# Patient Record
Sex: FEMALE | Race: BLACK OR AFRICAN AMERICAN | Employment: OTHER | URBAN - METROPOLITAN AREA
[De-identification: names, ages, dates, MRNs, and addresses within clinical notes are randomized per-mention and may not be internally consistent; named-entity substitution may affect disease eponyms.]

---

## 2017-01-03 ENCOUNTER — HOSPITAL ENCOUNTER (OUTPATIENT)
Dept: INFUSION THERAPY | Age: 62
Discharge: HOME | End: 2017-01-03
Payer: OTHER GOVERNMENT

## 2017-01-03 VITALS
TEMPERATURE: 98.6 F | OXYGEN SATURATION: 95 % | WEIGHT: 186.6 LBS | BODY MASS INDEX: 32.03 KG/M2 | DIASTOLIC BLOOD PRESSURE: 86 MMHG | SYSTOLIC BLOOD PRESSURE: 135 MMHG | HEART RATE: 84 BPM | RESPIRATION RATE: 16 BRPM

## 2017-01-03 DIAGNOSIS — C50.911 INFILTRATING DUCTAL CARCINOMA OF RIGHT BREAST (HCC): ICD-10-CM

## 2017-01-03 LAB
ALBUMIN SERPL BCP-MCNC: 3.7 G/DL (ref 3.2–4.6)
ALBUMIN/GLOB SERPL: 1 {RATIO} (ref 1.2–3.5)
ALP SERPL-CCNC: 68 U/L (ref 50–136)
ALT SERPL-CCNC: 18 U/L (ref 12–65)
ANION GAP BLD CALC-SCNC: 5 MMOL/L (ref 7–16)
AST SERPL W P-5'-P-CCNC: 9 U/L (ref 15–37)
BASOPHILS # BLD AUTO: 0 K/UL (ref 0–0.2)
BASOPHILS # BLD: 1 % (ref 0–2)
BILIRUB SERPL-MCNC: 0.3 MG/DL (ref 0.2–1.1)
BUN SERPL-MCNC: 12 MG/DL (ref 8–23)
CALCIUM SERPL-MCNC: 9.3 MG/DL (ref 8.3–10.4)
CHLORIDE SERPL-SCNC: 107 MMOL/L (ref 98–107)
CO2 SERPL-SCNC: 29 MMOL/L (ref 23–32)
CREAT SERPL-MCNC: 0.81 MG/DL (ref 0.6–1)
DIFFERENTIAL METHOD BLD: ABNORMAL
EOSINOPHIL # BLD: 0.1 K/UL (ref 0–0.8)
EOSINOPHIL NFR BLD: 1 % (ref 0.5–7.8)
ERYTHROCYTE [DISTWIDTH] IN BLOOD BY AUTOMATED COUNT: 14.4 % (ref 11.9–14.6)
GLOBULIN SER CALC-MCNC: 3.6 G/DL (ref 2.3–3.5)
GLUCOSE SERPL-MCNC: 98 MG/DL (ref 65–100)
HCT VFR BLD AUTO: 28.1 % (ref 35.8–46.3)
HGB BLD-MCNC: 9.1 G/DL (ref 11.7–15.4)
LYMPHOCYTES # BLD AUTO: 35 % (ref 13–44)
LYMPHOCYTES # BLD: 1.9 K/UL (ref 0.5–4.6)
MCH RBC QN AUTO: 30.7 PG (ref 26.1–32.9)
MCHC RBC AUTO-ENTMCNC: 32.4 G/DL (ref 31.4–35)
MCV RBC AUTO: 94.9 FL (ref 79.6–97.8)
MONOCYTES # BLD: 0.4 K/UL (ref 0.1–1.3)
MONOCYTES NFR BLD AUTO: 7 % (ref 4–12)
NEUTS SEG # BLD: 3 K/UL (ref 1.7–8.2)
NEUTS SEG NFR BLD AUTO: 57 % (ref 43–78)
NRBC # BLD: 0 K/UL (ref 0–0.2)
PLATELET # BLD AUTO: 339 K/UL (ref 150–450)
PMV BLD AUTO: 9.1 FL (ref 10.8–14.1)
POTASSIUM SERPL-SCNC: 3.7 MMOL/L (ref 3.5–5.1)
PROT SERPL-MCNC: 7.3 G/DL (ref 6.3–8.2)
RBC # BLD AUTO: 2.96 M/UL (ref 4.05–5.25)
SODIUM SERPL-SCNC: 141 MMOL/L (ref 136–145)
WBC # BLD AUTO: 5.4 K/UL (ref 4.3–11.1)

## 2017-01-03 PROCEDURE — 96375 TX/PRO/DX INJ NEW DRUG ADDON: CPT

## 2017-01-03 PROCEDURE — 96413 CHEMO IV INFUSION 1 HR: CPT

## 2017-01-03 PROCEDURE — 80053 COMPREHEN METABOLIC PANEL: CPT | Performed by: INTERNAL MEDICINE

## 2017-01-03 PROCEDURE — 77030003560 HC NDL HUBR BARD -A

## 2017-01-03 PROCEDURE — 74011250636 HC RX REV CODE- 250/636: Performed by: INTERNAL MEDICINE

## 2017-01-03 PROCEDURE — 96417 CHEMO IV INFUS EACH ADDL SEQ: CPT

## 2017-01-03 PROCEDURE — 85025 COMPLETE CBC W/AUTO DIFF WBC: CPT | Performed by: INTERNAL MEDICINE

## 2017-01-03 PROCEDURE — 74011000250 HC RX REV CODE- 250: Performed by: INTERNAL MEDICINE

## 2017-01-03 RX ORDER — DIPHENHYDRAMINE HYDROCHLORIDE 50 MG/ML
25 INJECTION, SOLUTION INTRAMUSCULAR; INTRAVENOUS ONCE
Status: COMPLETED | OUTPATIENT
Start: 2017-01-03 | End: 2017-01-03

## 2017-01-03 RX ORDER — ONDANSETRON 2 MG/ML
8 INJECTION INTRAMUSCULAR; INTRAVENOUS ONCE
Status: COMPLETED | OUTPATIENT
Start: 2017-01-03 | End: 2017-01-03

## 2017-01-03 RX ORDER — DEXAMETHASONE SODIUM PHOSPHATE 100 MG/10ML
8 INJECTION INTRAMUSCULAR; INTRAVENOUS ONCE
Status: COMPLETED | OUTPATIENT
Start: 2017-01-03 | End: 2017-01-03

## 2017-01-03 RX ORDER — FAMOTIDINE 10 MG/ML
20 INJECTION INTRAVENOUS ONCE
Status: COMPLETED | OUTPATIENT
Start: 2017-01-03 | End: 2017-01-03

## 2017-01-03 RX ORDER — SODIUM CHLORIDE 0.9 % (FLUSH) 0.9 %
10-30 SYRINGE (ML) INJECTION AS NEEDED
Status: DISCONTINUED | OUTPATIENT
Start: 2017-01-03 | End: 2017-01-07 | Stop reason: HOSPADM

## 2017-01-03 RX ORDER — HEPARIN 100 UNIT/ML
300 SYRINGE INTRAVENOUS AS NEEDED
Status: DISPENSED | OUTPATIENT
Start: 2017-01-03 | End: 2017-01-03

## 2017-01-03 RX ADMIN — DEXAMETHASONE SODIUM PHOSPHATE 8 MG: 10 INJECTION INTRAMUSCULAR; INTRAVENOUS at 12:48

## 2017-01-03 RX ADMIN — ONDANSETRON 8 MG: 2 INJECTION INTRAMUSCULAR; INTRAVENOUS at 12:44

## 2017-01-03 RX ADMIN — SODIUM CHLORIDE, PRESERVATIVE FREE 300 UNITS: 5 INJECTION INTRAVENOUS at 15:10

## 2017-01-03 RX ADMIN — Medication 166 MG: at 13:25

## 2017-01-03 RX ADMIN — DIPHENHYDRAMINE HYDROCHLORIDE 25 MG: 50 INJECTION, SOLUTION INTRAMUSCULAR; INTRAVENOUS at 12:52

## 2017-01-03 RX ADMIN — Medication 10 ML: at 15:10

## 2017-01-03 RX ADMIN — FAMOTIDINE 20 MG: 10 INJECTION, SOLUTION INTRAVENOUS at 12:42

## 2017-01-03 RX ADMIN — SODIUM CHLORIDE 500 ML: 900 INJECTION, SOLUTION INTRAVENOUS at 12:55

## 2017-01-03 RX ADMIN — Medication 10 ML: at 12:00

## 2017-01-03 RX ADMIN — PACLITAXEL 154 MG: 6 INJECTION, SOLUTION, CONCENTRATE INTRAVENOUS at 14:00

## 2017-01-03 NOTE — PROGRESS NOTES
Arrived to the [facility]. Herceptin and Taxol completed  Any issues or concerns during appointment:No  Patient aware of next infusion appointment on Tuesday,January 10th @ 1200  Discharged home accompanied by

## 2017-01-10 ENCOUNTER — HOSPITAL ENCOUNTER (OUTPATIENT)
Dept: LAB | Age: 62
Discharge: HOME | End: 2017-01-10
Payer: OTHER GOVERNMENT

## 2017-01-10 ENCOUNTER — HOSPITAL ENCOUNTER (OUTPATIENT)
Dept: INFUSION THERAPY | Age: 62
Discharge: HOME | End: 2017-01-10
Payer: OTHER GOVERNMENT

## 2017-01-10 DIAGNOSIS — C50.911 INFILTRATING DUCTAL CARCINOMA OF RIGHT BREAST (HCC): ICD-10-CM

## 2017-01-10 LAB
ALBUMIN SERPL BCP-MCNC: 3.5 G/DL (ref 3.2–4.6)
ALBUMIN/GLOB SERPL: 1 {RATIO} (ref 1.2–3.5)
ALP SERPL-CCNC: 67 U/L (ref 50–136)
ALT SERPL-CCNC: 18 U/L (ref 12–65)
ANION GAP BLD CALC-SCNC: 7 MMOL/L (ref 7–16)
AST SERPL W P-5'-P-CCNC: 10 U/L (ref 15–37)
BASOPHILS # BLD AUTO: 0 K/UL (ref 0–0.2)
BASOPHILS # BLD: 1 % (ref 0–2)
BILIRUB SERPL-MCNC: 0.3 MG/DL (ref 0.2–1.1)
BUN SERPL-MCNC: 12 MG/DL (ref 8–23)
CALCIUM SERPL-MCNC: 9.2 MG/DL (ref 8.3–10.4)
CHLORIDE SERPL-SCNC: 107 MMOL/L (ref 98–107)
CO2 SERPL-SCNC: 29 MMOL/L (ref 23–32)
CREAT SERPL-MCNC: 0.72 MG/DL (ref 0.6–1)
DIFFERENTIAL METHOD BLD: ABNORMAL
EOSINOPHIL # BLD: 0.1 K/UL (ref 0–0.8)
EOSINOPHIL NFR BLD: 1 % (ref 0.5–7.8)
ERYTHROCYTE [DISTWIDTH] IN BLOOD BY AUTOMATED COUNT: 14.5 % (ref 11.9–14.6)
GLOBULIN SER CALC-MCNC: 3.6 G/DL (ref 2.3–3.5)
GLUCOSE SERPL-MCNC: 101 MG/DL (ref 65–100)
HCT VFR BLD AUTO: 27.8 % (ref 35.8–46.3)
HGB BLD-MCNC: 9 G/DL (ref 11.7–15.4)
LYMPHOCYTES # BLD AUTO: 39 % (ref 13–44)
LYMPHOCYTES # BLD: 2 K/UL (ref 0.5–4.6)
MCH RBC QN AUTO: 30.8 PG (ref 26.1–32.9)
MCHC RBC AUTO-ENTMCNC: 32.4 G/DL (ref 31.4–35)
MCV RBC AUTO: 95.2 FL (ref 79.6–97.8)
MONOCYTES # BLD: 0.3 K/UL (ref 0.1–1.3)
MONOCYTES NFR BLD AUTO: 7 % (ref 4–12)
NEUTS SEG # BLD: 2.7 K/UL (ref 1.7–8.2)
NEUTS SEG NFR BLD AUTO: 53 % (ref 43–78)
NRBC # BLD: 0 K/UL (ref 0–0.2)
PLATELET # BLD AUTO: 288 K/UL (ref 150–450)
PMV BLD AUTO: 8.7 FL (ref 10.8–14.1)
POTASSIUM SERPL-SCNC: 3.3 MMOL/L (ref 3.5–5.1)
PROT SERPL-MCNC: 7.1 G/DL (ref 6.3–8.2)
RBC # BLD AUTO: 2.92 M/UL (ref 4.05–5.25)
SODIUM SERPL-SCNC: 143 MMOL/L (ref 136–145)
WBC # BLD AUTO: 5.1 K/UL (ref 4.3–11.1)

## 2017-01-10 PROCEDURE — 96417 CHEMO IV INFUS EACH ADDL SEQ: CPT

## 2017-01-10 PROCEDURE — 80053 COMPREHEN METABOLIC PANEL: CPT | Performed by: INTERNAL MEDICINE

## 2017-01-10 PROCEDURE — 96375 TX/PRO/DX INJ NEW DRUG ADDON: CPT

## 2017-01-10 PROCEDURE — 74011000250 HC RX REV CODE- 250: Performed by: INTERNAL MEDICINE

## 2017-01-10 PROCEDURE — 74011250636 HC RX REV CODE- 250/636: Performed by: INTERNAL MEDICINE

## 2017-01-10 PROCEDURE — 96361 HYDRATE IV INFUSION ADD-ON: CPT

## 2017-01-10 PROCEDURE — 85025 COMPLETE CBC W/AUTO DIFF WBC: CPT | Performed by: INTERNAL MEDICINE

## 2017-01-10 PROCEDURE — 96413 CHEMO IV INFUSION 1 HR: CPT

## 2017-01-10 RX ORDER — SODIUM CHLORIDE 0.9 % (FLUSH) 0.9 %
10 SYRINGE (ML) INJECTION AS NEEDED
Status: ACTIVE | OUTPATIENT
Start: 2017-01-10 | End: 2017-01-11

## 2017-01-10 RX ORDER — DIPHENHYDRAMINE HYDROCHLORIDE 50 MG/ML
25 INJECTION, SOLUTION INTRAMUSCULAR; INTRAVENOUS ONCE
Status: COMPLETED | OUTPATIENT
Start: 2017-01-10 | End: 2017-01-10

## 2017-01-10 RX ORDER — ONDANSETRON 2 MG/ML
8 INJECTION INTRAMUSCULAR; INTRAVENOUS ONCE
Status: COMPLETED | OUTPATIENT
Start: 2017-01-10 | End: 2017-01-10

## 2017-01-10 RX ORDER — FAMOTIDINE 10 MG/ML
20 INJECTION INTRAVENOUS ONCE
Status: COMPLETED | OUTPATIENT
Start: 2017-01-10 | End: 2017-01-10

## 2017-01-10 RX ORDER — HEPARIN 100 UNIT/ML
300-500 SYRINGE INTRAVENOUS AS NEEDED
Status: DISPENSED | OUTPATIENT
Start: 2017-01-10 | End: 2017-01-11

## 2017-01-10 RX ORDER — DEXAMETHASONE SODIUM PHOSPHATE 100 MG/10ML
8 INJECTION INTRAMUSCULAR; INTRAVENOUS ONCE
Status: COMPLETED | OUTPATIENT
Start: 2017-01-10 | End: 2017-01-10

## 2017-01-10 RX ADMIN — Medication 166 MG: at 13:05

## 2017-01-10 RX ADMIN — DEXAMETHASONE SODIUM PHOSPHATE 8 MG: 10 INJECTION INTRAMUSCULAR; INTRAVENOUS at 12:22

## 2017-01-10 RX ADMIN — SODIUM CHLORIDE, PRESERVATIVE FREE 300 UNITS: 5 INJECTION INTRAVENOUS at 14:47

## 2017-01-10 RX ADMIN — Medication 10 ML: at 14:47

## 2017-01-10 RX ADMIN — FAMOTIDINE 20 MG: 10 INJECTION, SOLUTION INTRAVENOUS at 12:28

## 2017-01-10 RX ADMIN — DIPHENHYDRAMINE HYDROCHLORIDE 25 MG: 50 INJECTION, SOLUTION INTRAMUSCULAR; INTRAVENOUS at 12:30

## 2017-01-10 RX ADMIN — PACLITAXEL 154 MG: 6 INJECTION, SOLUTION, CONCENTRATE INTRAVENOUS at 13:43

## 2017-01-10 RX ADMIN — ONDANSETRON 8 MG: 2 INJECTION INTRAMUSCULAR; INTRAVENOUS at 12:20

## 2017-01-10 RX ADMIN — SODIUM CHLORIDE 500 ML: 900 INJECTION, SOLUTION INTRAVENOUS at 12:32

## 2017-01-10 NOTE — PROGRESS NOTES
Pt arrived ambulatory for C3 D22 Taxol, herceptin, she denies new complaints. Pt tolerated infusion without incident. Pt dc'd stable, to return to [facility] on 1/17 at 1115.

## 2017-01-12 ENCOUNTER — HOSPITAL ENCOUNTER (OUTPATIENT)
Dept: RADIATION ONCOLOGY | Age: 62
Discharge: HOME | End: 2017-01-12
Payer: OTHER GOVERNMENT

## 2017-01-12 VITALS
BODY MASS INDEX: 32.08 KG/M2 | HEART RATE: 83 BPM | TEMPERATURE: 98.6 F | WEIGHT: 186.9 LBS | DIASTOLIC BLOOD PRESSURE: 100 MMHG | OXYGEN SATURATION: 96 % | SYSTOLIC BLOOD PRESSURE: 165 MMHG

## 2017-01-12 PROCEDURE — 99211 OFF/OP EST MAY X REQ PHY/QHP: CPT

## 2017-01-12 NOTE — CONSULTS
Patient: Meagan Quiñonez MRN: [id number]  SSN: [id number]    YOB: 1955  Age: 62 y.o. Sex: female      Other Providers:  Richard Escalante MD    CHIEF COMPLAINT: Breast cancer    DIAGNOSIS: Right sided [id number] Stage IA invasive ductal carcinoma, two foci of disease, largest 0.7 cm.  92% ER, 7% MS, HER2 3+ positive    PREVIOUS TREATMENT:  1) 8/26/16:  Right breast lumpectomy   2) 9/23/16: Re-resection and SLN biopsy   3) Weekly paclitaxel + trastuzumab for 12 weeks, followed by Herceptin monotherapy to complete a year    HISTORY OF PRESENT ILLNESS:  Meagan Quiñonez is a 62 y.o. female who I am seeing at the request of Richard Escalante MD.  She had a screening mammogram in June 2016 that showed a developing group of calcifications in the upper outer right breast. She completed biopsy which showed DCIS. MRI was also completed showing a 9 mm enhancing mass. She was referred for surgical management and underwent lumpectomy on 8/26/16. Pathology review surprisingly showed two foci of invasive ductal carcinoma in a background of DCIS. The larger of the two foci was 0.7 cm, and receptors showed 92% ER and 7% MS, but also showed HER2 3+ positive. She went back for sentinel node biopsy and reexcision, both of which were negative for residual carcinoma. She was referred to medical oncology for adjuvant recommendations and saw Dr. Khushi Newton who felt she would benefit from APT regimen with weekly paclitaxel + trastuzumab for 12 weeks, followed by Herceptin monotherapy to complete a year. She will also be receiving endocrine therapy with AI.      She was seen by Dr. Khushi Newton prior to her final chemotherapy and noted nausea and fatigue from chemo but was manageable. No fevers, chills or other infectious symptoms. Neuropathy grade 1, stable and not interfering with ADLs. Pain in the tips of her nails, but not interfering with ADLs.  With the completion of chemo, she was referred for adjuvant radiation recommendations which is the rationale for my visit with her today. OBSTETRIC HISTORY:    Menarche at the age of 14.    G3,P1. Oral Contraceptive Pills:  Around 15 years  Hormone Replacement Therapy:  Premarin in June and took only briefly. PAST MEDICAL HISTORY:    Past Medical History   Diagnosis Date    Anemia >20yrs ago    Cancer [facility]) Dx 10/2016     right breast cancer    Ductal carcinoma in situ (DCIS) of right breast 7/2016    Enlarged uterus 5/31/2016    Hypertension      managed with medication    Murmur, functional      diagnosed as teenager    Obesity 5/31/2016    Paroxysmal atrial fibrillation ([facility] [address] 09/11/2001    Psychiatric disorder      anxiety    Sleep apnea 09/11/2013     can not tolerate c pap    Vaginal atrophy 5/31/2016       The patient denies history of collagen vascular diseases, pacemaker insertion, prior radiation or prior chemotherapy. PAST SURGICAL HISTORY:   Past Surgical History   Procedure Laterality Date    Hx tubal ligation      Hx orthopaedic       left wrist ORIF    Hx colonoscopy  2007     diverticulosis    Lynnette biopsy breast stereotactic Right 7.19.16    Hx breast lumpectomy Right 8/26/2016     RIGHT BREAST LUMPECTOMY performed by Shane Zamora MD     Hx breast biopsy Right 8/26/2016     RIGHT BREAST NEEDLE LOCALIZED BIOPSY performed by Shane Zamora MD     Hx breast lumpectomy Right 9/23/2016     RIGHT BREAST LUMPECTOMY/PARTIAL REVISION  performed by Shane Zamora    Hx vascular access         MEDICATIONS:     Current Outpatient Prescriptions:     LOPERAMIDE HCL (IMODIUM PO), Take  by mouth as needed. , Disp: , Rfl:     DIPHENHYDRAMINE HCL (BENADRYL PO), Take  by mouth as needed. , Disp: , Rfl:     lidocaine-prilocaine (EMLA) topical cream, Apply  to affected area as needed for Pain. Apply to port site 45-60 minutes prior to lab appt or infusion. , Disp: 30 g, Rfl: 1    ondansetron hcl (ZOFRAN, AS HYDROCHLORIDE,) 8 mg tablet, Take 1 Tab by mouth every eight (8) hours as needed for Nausea., Disp: 90 Tab, Rfl: 2    LORazepam (ATIVAN) 0.5 mg tablet, Take 1 Tab by mouth daily as needed for Anxiety. (Patient taking differently: Take 0.5 mg by mouth as needed for Anxiety.), Disp: 30 Tab, Rfl: 1    hydroCHLOROthiazide (HYDRODIURIL) 25 mg tablet, Take 1 Tab by mouth daily. , Disp: 90 Tab, Rfl: 1    lisinopril (PRINIVIL, ZESTRIL) 40 mg tablet, Take 1 Tab by mouth daily. , Disp: 90 Tab, Rfl: 1    atorvastatin (LIPITOR) 40 mg tablet, Take 1 Tab by mouth nightly., Disp: 90 Tab, Rfl: 1    amLODIPine (NORVASC) 10 mg tablet, Take 1 Tab by mouth daily. , Disp: 90 Tab, Rfl: 1    sertraline (ZOLOFT) 100 mg tablet, Take 1 Tab by mouth daily. , Disp: 30 Tab, Rfl: 0    aspirin (JESSICA CHEWABLE ASPIRIN) 81 mg chewable tablet, Take 81 mg by mouth daily. , Disp: , Rfl:     ALLERGIES:   Allergies   Allergen Reactions    Pcn [Penicillins] Hives       SOCIAL HISTORY:   Social History     Social History    Marital status:      Spouse name: N/A    Number of children: N/A    Years of education: N/A     Occupational History    Not on file. Social History Main Topics    Smoking status: Never Smoker    Smokeless tobacco: Never Used    Alcohol use No    Drug use: No    Sexual activity: Not on file     Other Topics Concern    Not on file     Social History Narrative    Lives at home with , Diane Schwartz, and her mother. Homemaker. FAMILY HISTORY:   Family History   Problem Relation Age of Onset    Diabetes Mother    [address] [facility] Alzheimer Mother     No Known Problems Father        REVIEW OF SYSTEMS: Please see the completed review of systems sheet in the chart that I have reviewed today.       PHYSICAL EXAMINATION:   ECOG Performance status 0  VITAL SIGNS:   Visit Vitals    BP (!) 165/100 (BP 1 Location: Left arm, BP Patient Position: Sitting)    Pulse 83    Temp 98.6 °F (37 °C) (Oral)    Wt 84.8 kg (186 lb 14.4 oz)    SpO2 96%    BMI 32.08 kg/m2        GENERAL: The patient is well-developed, ambulatory, alert and in no acute distress. HEENT: Head is normocephalic, atraumatic. Pupils are equal, round and reactive to light and accommodation. Extraocular movement intact. Hearing is intact bilaterally to finger rub. Oral cavity reveals no lesions. Mucous membranes are moist. NECK: Neck is supple with no masses. CARDIOVASCULAR: Heart is regular rate and rhythm. There are no murmurs rubs or gallups. Radial pulses are 2+ RESPIRATORY: Lungs are clear to auscultation and percussion. There is normal respiratory effort. GASTROINTESTINAL: The abdomen is soft, non-tender, nondistended with no hepatospelnomagaly. Digital rectal examination: deferred LYMPHATIC: There is no cervical, supraclavicular or axillary lymphadenopathy bilaterally. MUSCULOSKELETAL: Extremities reveal no cyanosis, clubbing or edema.  is 5+/5. BREASTS: Examination of the unaffected breast reveals no dominant nodules or masses. The lumpectomy cavity appears well healed with good aesthetics and symmetry. Well healed surgical incision. NEURO:  Cranial nerves II-XII grossly intact. Muscular strength and sensation are intact throughout all four extremities. PATHOLOGY:    8/26/16:     DIAGNOSIS   RIGHT BREAST LOCALIZED WIDE EXCISION: TWO FOCI OF INFILTRATING DUCT CARCINOMA, LOW GRADE (WELL DIFFERENTIATED) IN A BACKGROUND OF MULTIFOCAL DUCTAL CARCINOMA IN-SITU, HIGH GRADE (CRIBRIFORM AND SOLID TYPE). ONE FOCUS OF INFILTRATING CARCINOMA IS APPROXIMATELY 0.7 X 0.4 CM ON SLIDE AND IS APPROXIMATELY 0.5 CM FROM MARKED ANTERIOR MARGIN. OTHER MARGINS ARE GREATER THAN 1 CM FROM THIS INFILTRATING TUMOR. SECOND FOCUS OF INFILTRATING CARCINOMA IS APPROXIMATELY 0.1 X 0.1 CM AND IS APPROXIMATELY 0.2 CM FROM MARKED POSTERIOR MARGIN. DUCTAL CARCINOMA IN-SITU IS PRESENT LESS THAN 0.1 CM FROM MARKED ANTERIOR MARGIN AND APPROXIMATELY 0.1 CM FROM MARKED POSTERIOR AND MEDIAL MARGINS.  OTHER MARGINS ARE GREATER THAN 1 CM FROM TUMOR (SEE [id number]). Interpretation   Clarient Diagnostic Result:   TEST NAME: RESULTS: INTERNAL CONTROLS:   ESTROGEN RECEPTOR: Positive (92%) Present and ER positive   PROGESTERONE RECEPTOR: Positive (7%) Present and PA positive   HER-2/JAGDISH: Positive (3+) Percent of cells with 3+ stain: 38%      ANATOMIC SITE: Right breast (presumably 10 o'clock position based on prior core biopsy). PROCEDURE: Needle localized wide excision. HISTOLOGIC TYPE: Infiltrating duct carcinoma. SIZE: Two foci, one approximately 0.7 x 0.4 cm and an apparent separate focus approximately 0.1 x 0.1 cm. UNIFOCAL OR MULTIFOCAL: Apparently multifocal.   PRESENCE OF SKIN, NIPPLE OR SKELETAL MUSCLE INVOLVEMENT: Not identified. ZARA MODIFICATION OF BLOOM-KAPADIA GRADE:   ARCHITECTURAL SCORE: 2/3. NUCLEAR SCORE: 2/3. MITOTIC SCORE: 1/3. TOTAL SCORE: 5/9 = Low Grade for both tumors. IN-SITU COMPONENT: Multifocal ductal carcinoma in-situ is noted, high grade (cribriform and solid type). LYMPHOVASCULAR INVASION: Not identified. ARE MICROCALCIFICATIONS IDENTIFIED: Within ductal carcinoma in-situ. TUMOR DISTANCE TO CLOSEST MARGIN: Ductal carcinoma in-situ is present less than 0.1 cm from marked anterior margin and approximately 0.1 cm from marked medial margin. Small focus of infiltrating carcinoma is present approximately 0.2 cm from marked deep margin. ANTERIOR (SUPERFICIAL): Ductal carcinoma in-situ is present less than 0.1 cm from marked anterior margin and larger focus of infiltrating carcinoma is present approximately 0.5 cm from marked anterior margin. POSTERIOR (DEEP): Focus of ductal carcinoma in-situ is present approximately 0.1 cm from marked posterior margin and small focus of infiltrating carcinoma is present approximately 0.2 cm from marked posterior margin. MEDIAL: Focus of ductal carcinoma in-situ is present approximately 0.1 cm from marked medial margin. LATERAL: Greater than 1 cm.    INFERIOR: Greater than 1 cm.   SUPERIOR: Greater than 1 cm. LYMPH NODE STATUS: Does not apply. TOTAL NUMBER OF LYMPH NODES CONTAINING CARCINOMA: Does not apply. TOTAL NUMBER OF LYMPH NODES EXAMINED: Does not apply. METASTASIS: Does not apply. SIZE OF LARGEST POSITIVE NODE: Does not apply. EXTRA-CAPSULAR EXTENSION OF TUMOR: Does not apply. OTHER FINDINGS: Changes consistent with prior biopsy site. TNM CLASSIFICATION: pT1 pNX.     9/23/16:   DIAGNOSIS   A: POST LUMPECTOMY MARGIN RIGHT BREAST:   BENIGN BREAST TISSUE WITH FIBROCYSTIC MASTOPATHY AND FAT NECROSIS. NO RESIDUAL CARCINOMA IS IDENTIFIED. B: SENTINEL NODE RIGHT BREAST:   ONE LYMPH NODE NEGATIVE FOR METASTATIC     LABORATORY:   Lab Results   Component Value Date/Time    Sodium 143 01/10/2017 10:56 AM    Potassium 3.3 01/10/2017 10:56 AM    Chloride 107 01/10/2017 10:56 AM    CO2 29 01/10/2017 10:56 AM    Anion gap 7 01/10/2017 10:56 AM    Glucose 101 01/10/2017 10:56 AM    BUN 12 01/10/2017 10:56 AM    Creatinine 0.72 01/10/2017 10:56 AM    GFR est AA >60 01/10/2017 10:56 AM    GFR est non-AA >60 01/10/2017 10:56 AM    Calcium 9.2 01/10/2017 10:56 AM    Magnesium 2.1 11/01/2016 10:40 AM    Albumin 3.5 01/10/2017 10:56 AM    Protein, total 7.1 01/10/2017 10:56 AM    Globulin 3.6 01/10/2017 10:56 AM    A-G Ratio 1.0 01/10/2017 10:56 AM    AST 10 01/10/2017 10:56 AM    ALT 18 01/10/2017 10:56 AM     Lab Results   Component Value Date/Time    WBC 5.1 01/10/2017 10:56 AM    HGB 9.0 01/10/2017 10:56 AM    HCT 27.8 01/10/2017 10:56 AM    PLATELET 759 01/46/8926 10:56 AM       RADIOLOGY:    I have personally reviewed the imaging and agree with the reports below.    6/24/16:STUDY: Bilateral digital screening mammogram      INDICATION: Screening     COMPARISON: 12/21/2012     FINDINGS: Bilateral digital mammography was performed, and is interpreted in  conjunction with a computer assisted detection (CAD) system.      The breast parenchymal density is normal. There is developing calcification in  the upper outer right breast. No significant masses are identified. There is  no skin thickening or nipple retraction. There is no architectural distortion.     IMPRESSION: Developing calcification in the upper outer right breast.   Magnification views recommended.     BI-RADS Assessment Category 0: Incomplete: Needs additional imaging evaluation.     We will attempt to contact the patient and arrange this additional imaging.     BD2    MRI BILATERAL BREASTS WITHOUT AND WITH CONTRAST, 7/29/2016.      CLINICAL HISTORY: 60-year-old with new diagnosis of right DCIS. Patient without  breast complaints.     Technique: Multiplanar multisequence imaging of the breast were performed prior  to and following the uneventful intravenous administration of 17 mL of  Magnevist. Postcontrast imaging was performed using a dynamic technique. Images  were reviewed using the [employer].      Sequences obtained: Sagittal T2, axial STIR, axial T1, and axial fat-saturated  T1-weighted images prior to and following administration of contrast.      Comparison studies: Bilateral mammogram 6/24/2016.      FINDINGS:   A skin marker has been placed at the 10:00 position of the left breast located  10 cm from the nipple marking the patient's biopsy site. The breasts are  composed of heterogeneous fibroglandular elements. No enlarged axillary lymph  nodes are seen. No enlarged internal mammary lymph nodes are seen. Evaluation of  the lungs is limited by MRI imaging. However, no obvious pulmonary masses are  seen. No significant pleural effusions are seen. The liver is obscured by  cardiac motion artifact and only partially visualized. A T2 hyperintense lesion  is suggested in the superior right lobe on axial STIR image 9 which likely  represents a small cyst.       Following the administration of intravenous contrast, normal enhancement is seen  of the heart and vascular structures of the breasts. Moderate background  physiologic enhancement of the breasts is seen. An enhancing biopsy tract is  seen extending from the lateral towards the central breast. The biopsy cavity is  felt to be best appreciated on axial postcontrast subtraction image 261, and  sagittal postcontrast reconstructed image 47. Just anterior and inferior to the  biopsy cavity, there is a 6 mm x 8 mm x 9 mm area of masslike enhancement best  appreciated on axial postcontrast subtraction image 259, and sagittal  reconstructed postcontrast image 48 which may represent a small area of invasive  disease. This is felt to correspond to an adjacent nodular density as seen on  the prior diagnostic mammogram. Additional nodular enhancement is seen extending  anteriorly to the biopsy cavity by approximately 3.2 cm best appreciated on  sagittal reconstructed postcontrast image 46 for which additional noninvasive  disease cannot be excluded. These do appear to correspond to an additional faint  calcifications anterior to the biopsied cluster seen on the prior diagnostic  mammogram. Stereotactic biopsy of the additional calcifications can be  considered if this would change clinical management. No evidence of skin  thickening, or nipple retraction is seen.      IMPRESSION:  1. Post biopsy changes in the outer right breast. A small 9 mm enhancing mass,  and additional nonmasslike enhancement are seen anterior to the biopsy cavity as  described above suggesting potential multifocal disease. No evidence for  multicentric disease, or metastatic disease is otherwise seen.     BI-RADS Assessment Category 6: Known Biopsy Proven Malignancy    IMPRESSION:  Anika Tidwell is a 62 y.o. female with Stage IA breast cancer. The natural history of breast cancer was reviewed with the patient. Prognostic features including stage, performance status and presence or absence of lymph node involvement were reviewed with the patient.    Various treatment options including lumpectomy alone, breast conservation therapy, and mastectomy were compared and contrasted with regard to outcome and quality of life. We discussed the data in support of breast conservation therapy, specifically NSABP B-06, which compared mastectomy to lumpectomy plus or minus radiation. This showed no difference in overall survival but improved local regional control with adjuvant radiation. This has become the standard for patient's wishing to conserve the breast.  Subsequent trials have evaluated the role of boost following an initial course and again showed improved control. We therefore typically recommend 50 gray to the breast followed by a 10 Gray boost to the lumpectomy cavity. There is now also long-term data in support of hypofractionation which has come out of [address] [place] [address] where 3-4 weeks of radiation was compared to the conventional treatment and to date has shown equivalent tumor control and cosmetic outcomes. This is an option for women who were not excluded from the studies or found on subgroup analysis to suffer worse outcomes: women with Grade III tumors, generally women receiving chemotherapy, with large breast size such that significant heterogeneity can not be avoided, and lymph node node positive disease. JUAN JOSE consensus guidelines now support these conclusions. With her having received chemotherapy, she would not be a candidate for hypofractionation. The practicalities, indications, benefits, side-effects and complications of radiation therapy were discussed. Skin changes fatigue, and potential for long-term complications including radiation pneumonitis, and rib fractures were among the complications highlighted. I have recommended a course of radiation therapy to the breast as a standard portion of breast conservation therapy.   The patient and her  asked numerous questions, which were answered to their satisfaction and written informed consent for radiation therapy was obtained. Plan:  1. Genetic testing-not indicated  2. Smoking cessation-not indicated  3. Patient will be simulated, with radiotherapy to begin shortly thereafter. A dose of 50 Gy to the whole breast followed by a 10 Gy in 5 fraction boost to the tumor bed will be prescribed.      Chaparrita Navarrete MD   January 12, 2017

## 2017-01-12 NOTE — NURSE NAVIGATOR
Pt here for consult for right breast cancer. S/p lumpectomy x 2 and chemo. On Herceptin.  4/ Para 3. Started menses at 14 yoa. Postmenopausal.  Hx contraceptive use. Started premarin in . Took  Briefly. CONSENTS SIGNED FOR RT TO THE RIGHT BREAST.   CT [facility] SCHEDULED FOR 17 AT 10 AM.  APT GIVEN TO PT.    Catie Aden RN

## 2017-01-17 ENCOUNTER — HOSPITAL ENCOUNTER (OUTPATIENT)
Dept: INFUSION THERAPY | Age: 62
Discharge: HOME | End: 2017-01-17
Payer: OTHER GOVERNMENT

## 2017-01-17 ENCOUNTER — HOSPITAL ENCOUNTER (OUTPATIENT)
Dept: RADIATION ONCOLOGY | Age: 62
Discharge: HOME | End: 2017-01-17
Payer: OTHER GOVERNMENT

## 2017-01-17 VITALS
RESPIRATION RATE: 18 BRPM | DIASTOLIC BLOOD PRESSURE: 92 MMHG | HEART RATE: 83 BPM | SYSTOLIC BLOOD PRESSURE: 163 MMHG | OXYGEN SATURATION: 97 % | TEMPERATURE: 98.2 F | WEIGHT: 186.29 LBS | BODY MASS INDEX: 31.98 KG/M2

## 2017-01-17 DIAGNOSIS — C50.911 INFILTRATING DUCTAL CARCINOMA OF RIGHT BREAST (HCC): ICD-10-CM

## 2017-01-17 LAB
ALBUMIN SERPL BCP-MCNC: 3.8 G/DL (ref 3.2–4.6)
ALBUMIN/GLOB SERPL: 1 {RATIO} (ref 1.2–3.5)
ALP SERPL-CCNC: 70 U/L (ref 50–136)
ALT SERPL-CCNC: 18 U/L (ref 12–65)
ANION GAP BLD CALC-SCNC: 4 MMOL/L (ref 7–16)
AST SERPL W P-5'-P-CCNC: 10 U/L (ref 15–37)
BASOPHILS # BLD AUTO: 0 K/UL (ref 0–0.2)
BASOPHILS # BLD: 1 % (ref 0–2)
BILIRUB SERPL-MCNC: 0.2 MG/DL (ref 0.2–1.1)
BUN SERPL-MCNC: 14 MG/DL (ref 8–23)
CALCIUM SERPL-MCNC: 9 MG/DL (ref 8.3–10.4)
CHLORIDE SERPL-SCNC: 107 MMOL/L (ref 98–107)
CO2 SERPL-SCNC: 29 MMOL/L (ref 23–32)
CREAT SERPL-MCNC: 0.81 MG/DL (ref 0.6–1)
DIFFERENTIAL METHOD BLD: ABNORMAL
EOSINOPHIL # BLD: 0 K/UL (ref 0–0.8)
EOSINOPHIL NFR BLD: 1 % (ref 0.5–7.8)
ERYTHROCYTE [DISTWIDTH] IN BLOOD BY AUTOMATED COUNT: 14.7 % (ref 11.9–14.6)
GLOBULIN SER CALC-MCNC: 3.7 G/DL (ref 2.3–3.5)
GLUCOSE SERPL-MCNC: 96 MG/DL (ref 65–100)
HCT VFR BLD AUTO: 28.7 % (ref 35.8–46.3)
HGB BLD-MCNC: 9.3 G/DL (ref 11.7–15.4)
LYMPHOCYTES # BLD AUTO: 38 % (ref 13–44)
LYMPHOCYTES # BLD: 1.9 K/UL (ref 0.5–4.6)
MCH RBC QN AUTO: 31 PG (ref 26.1–32.9)
MCHC RBC AUTO-ENTMCNC: 32.4 G/DL (ref 31.4–35)
MCV RBC AUTO: 95.7 FL (ref 79.6–97.8)
MONOCYTES # BLD: 0.5 K/UL (ref 0.1–1.3)
MONOCYTES NFR BLD AUTO: 10 % (ref 4–12)
NEUTS SEG # BLD: 2.7 K/UL (ref 1.7–8.2)
NEUTS SEG NFR BLD AUTO: 52 % (ref 43–78)
NRBC # BLD: 0 K/UL (ref 0–0.2)
PLATELET # BLD AUTO: 309 K/UL (ref 150–450)
PMV BLD AUTO: 8.9 FL (ref 10.8–14.1)
POTASSIUM SERPL-SCNC: 3.9 MMOL/L (ref 3.5–5.1)
PROT SERPL-MCNC: 7.5 G/DL (ref 6.3–8.2)
RBC # BLD AUTO: 3 M/UL (ref 4.05–5.25)
SODIUM SERPL-SCNC: 140 MMOL/L (ref 136–145)
WBC # BLD AUTO: 5.2 K/UL (ref 4.3–11.1)

## 2017-01-17 PROCEDURE — 77030003560 HC NDL HUBR BARD -A

## 2017-01-17 PROCEDURE — 80053 COMPREHEN METABOLIC PANEL: CPT | Performed by: INTERNAL MEDICINE

## 2017-01-17 PROCEDURE — 96413 CHEMO IV INFUSION 1 HR: CPT

## 2017-01-17 PROCEDURE — 96361 HYDRATE IV INFUSION ADD-ON: CPT

## 2017-01-17 PROCEDURE — 74011250636 HC RX REV CODE- 250/636: Performed by: INTERNAL MEDICINE

## 2017-01-17 PROCEDURE — 77332 RADIATION TREATMENT AID(S): CPT

## 2017-01-17 PROCEDURE — 96375 TX/PRO/DX INJ NEW DRUG ADDON: CPT

## 2017-01-17 PROCEDURE — 77290 THER RAD SIMULAJ FIELD CPLX: CPT

## 2017-01-17 PROCEDURE — 74011250637 HC RX REV CODE- 250/637: Performed by: INTERNAL MEDICINE

## 2017-01-17 PROCEDURE — 85025 COMPLETE CBC W/AUTO DIFF WBC: CPT | Performed by: INTERNAL MEDICINE

## 2017-01-17 RX ORDER — ACETAMINOPHEN 325 MG/1
650 TABLET ORAL ONCE
Status: COMPLETED | OUTPATIENT
Start: 2017-01-17 | End: 2017-01-17

## 2017-01-17 RX ORDER — DIPHENHYDRAMINE HYDROCHLORIDE 50 MG/ML
25 INJECTION, SOLUTION INTRAMUSCULAR; INTRAVENOUS ONCE
Status: COMPLETED | OUTPATIENT
Start: 2017-01-17 | End: 2017-01-17

## 2017-01-17 RX ORDER — HEPARIN 100 UNIT/ML
300-500 SYRINGE INTRAVENOUS AS NEEDED
Status: DISPENSED | OUTPATIENT
Start: 2017-01-17 | End: 2017-01-17

## 2017-01-17 RX ORDER — SODIUM CHLORIDE 0.9 % (FLUSH) 0.9 %
10 SYRINGE (ML) INJECTION AS NEEDED
Status: ACTIVE | OUTPATIENT
Start: 2017-01-17 | End: 2017-01-17

## 2017-01-17 RX ADMIN — ACETAMINOPHEN 650 MG: 325 TABLET, FILM COATED ORAL at 12:00

## 2017-01-17 RX ADMIN — Medication 498 MG: at 12:43

## 2017-01-17 RX ADMIN — Medication 10 ML: at 13:34

## 2017-01-17 RX ADMIN — SODIUM CHLORIDE, PRESERVATIVE FREE 500 UNITS: 5 INJECTION INTRAVENOUS at 13:34

## 2017-01-17 RX ADMIN — Medication 10 ML: at 11:55

## 2017-01-17 RX ADMIN — DIPHENHYDRAMINE HYDROCHLORIDE 25 MG: 50 INJECTION, SOLUTION INTRAMUSCULAR; INTRAVENOUS at 11:55

## 2017-01-17 RX ADMIN — SODIUM CHLORIDE 500 ML: 900 INJECTION, SOLUTION INTRAVENOUS at 11:57

## 2017-01-17 NOTE — PROGRESS NOTES
Arrived to the [facility]. Chemo completed. Patient tolerated well. Any issues or concerns during appointment: none. Patient aware of next infusion appointment on 2/7/17 at 1115. Discharged ambulatory.

## 2017-01-23 ENCOUNTER — HOSPITAL ENCOUNTER (OUTPATIENT)
Dept: RADIATION ONCOLOGY | Age: 62
Discharge: HOME | End: 2017-01-23
Payer: OTHER GOVERNMENT

## 2017-01-23 PROCEDURE — 77334 RADIATION TREATMENT AID(S): CPT

## 2017-01-23 PROCEDURE — 77300 RADIATION THERAPY DOSE PLAN: CPT

## 2017-01-23 PROCEDURE — 77295 3-D RADIOTHERAPY PLAN: CPT

## 2017-01-25 ENCOUNTER — APPOINTMENT (OUTPATIENT)
Dept: RADIATION ONCOLOGY | Age: 62
End: 2017-01-25
Payer: OTHER GOVERNMENT

## 2017-01-26 ENCOUNTER — APPOINTMENT (OUTPATIENT)
Dept: RADIATION ONCOLOGY | Age: 62
End: 2017-01-26
Payer: OTHER GOVERNMENT

## 2017-01-27 ENCOUNTER — APPOINTMENT (OUTPATIENT)
Dept: RADIATION ONCOLOGY | Age: 62
End: 2017-01-27
Payer: OTHER GOVERNMENT

## 2017-01-30 ENCOUNTER — APPOINTMENT (OUTPATIENT)
Dept: RADIATION ONCOLOGY | Age: 62
End: 2017-01-30
Payer: OTHER GOVERNMENT

## 2017-01-30 ENCOUNTER — HOSPITAL ENCOUNTER (OUTPATIENT)
Dept: NON INVASIVE DIAGNOSTICS | Age: 62
Discharge: HOME | End: 2017-01-30
Attending: INTERNAL MEDICINE
Payer: OTHER GOVERNMENT

## 2017-01-30 DIAGNOSIS — Z51.81 ENCOUNTER FOR MONITORING CARDIOTOXIC DRUG THERAPY: ICD-10-CM

## 2017-01-30 DIAGNOSIS — Z79.899 ENCOUNTER FOR MONITORING CARDIOTOXIC DRUG THERAPY: ICD-10-CM

## 2017-01-30 PROCEDURE — 93306 TTE W/DOPPLER COMPLETE: CPT

## 2017-01-31 ENCOUNTER — APPOINTMENT (OUTPATIENT)
Dept: RADIATION ONCOLOGY | Age: 62
End: 2017-01-31
Payer: OTHER GOVERNMENT

## 2017-02-01 ENCOUNTER — HOSPITAL ENCOUNTER (OUTPATIENT)
Dept: RADIATION ONCOLOGY | Age: 62
Discharge: HOME | End: 2017-02-01
Payer: OTHER GOVERNMENT

## 2017-02-01 ENCOUNTER — APPOINTMENT (OUTPATIENT)
Dept: RADIATION ONCOLOGY | Age: 62
End: 2017-02-01
Payer: OTHER GOVERNMENT

## 2017-02-01 PROCEDURE — 77412 RADIATION TX DELIVERY LVL 3: CPT

## 2017-02-01 PROCEDURE — 77280 THER RAD SIMULAJ FIELD SMPL: CPT

## 2017-02-02 ENCOUNTER — HOSPITAL ENCOUNTER (OUTPATIENT)
Dept: RADIATION ONCOLOGY | Age: 62
Discharge: HOME | End: 2017-02-02
Payer: OTHER GOVERNMENT

## 2017-02-02 ENCOUNTER — APPOINTMENT (OUTPATIENT)
Dept: RADIATION ONCOLOGY | Age: 62
End: 2017-02-02
Payer: OTHER GOVERNMENT

## 2017-02-02 PROCEDURE — 77331 SPECIAL RADIATION DOSIMETRY: CPT

## 2017-02-02 PROCEDURE — 77412 RADIATION TX DELIVERY LVL 3: CPT

## 2017-02-03 ENCOUNTER — HOSPITAL ENCOUNTER (OUTPATIENT)
Dept: RADIATION ONCOLOGY | Age: 62
Discharge: HOME | End: 2017-02-03
Payer: OTHER GOVERNMENT

## 2017-02-03 ENCOUNTER — APPOINTMENT (OUTPATIENT)
Dept: RADIATION ONCOLOGY | Age: 62
End: 2017-02-03
Payer: OTHER GOVERNMENT

## 2017-02-03 PROCEDURE — 77412 RADIATION TX DELIVERY LVL 3: CPT

## 2017-02-06 ENCOUNTER — HOSPITAL ENCOUNTER (OUTPATIENT)
Dept: RADIATION ONCOLOGY | Age: 62
Discharge: HOME | End: 2017-02-06
Payer: OTHER GOVERNMENT

## 2017-02-06 ENCOUNTER — APPOINTMENT (OUTPATIENT)
Dept: RADIATION ONCOLOGY | Age: 62
End: 2017-02-06
Payer: OTHER GOVERNMENT

## 2017-02-06 PROCEDURE — 77412 RADIATION TX DELIVERY LVL 3: CPT

## 2017-02-06 NOTE — PROGRESS NOTES
Patient: Latha Mena MRN: [id number]  SSN: [id number]    YOB: 1955  Age: 62 y.o. Sex: female      DIAGNOSIS: Right sided [id number] Stage IA invasive ductal carcinoma, two foci of disease, largest 0.7 cm. 92% ER, 7% MO, HER2 3+ positive     PREVIOUS TREATMENT:  1) 8/26/16: Right breast lumpectomy   2) 9/23/16: Re-resection and SLN biopsy   3) Weekly paclitaxel + trastuzumab for 12 weeks, followed by Herceptin monotherapy to complete a year    TREATMENT SITE:  Right breast    DOSE and FRACTIONATION:  4/25 fractions, 800 cGy of 5000 cGy planned, 0/5 boost.    INTERVAL HISTORY:  Latha Mena is a 62 y.o. female being treated for breast cancer. She is doing well without complaints. OBJECTIVE:  No findings. There were no vitals taken for this visit. Lab Results   Component Value Date/Time    Sodium 140 01/17/2017 11:05 AM    Potassium 3.9 01/17/2017 11:05 AM    Chloride 107 01/17/2017 11:05 AM    CO2 29 01/17/2017 11:05 AM    Anion gap 4 01/17/2017 11:05 AM    Glucose 96 01/17/2017 11:05 AM    BUN 14 01/17/2017 11:05 AM    Creatinine 0.81 01/17/2017 11:05 AM    GFR est AA >60 01/17/2017 11:05 AM    GFR est non-AA >60 01/17/2017 11:05 AM    Calcium 9.0 01/17/2017 11:05 AM    Magnesium 2.1 11/01/2016 10:40 AM    Albumin 3.8 01/17/2017 11:05 AM    Protein, total 7.5 01/17/2017 11:05 AM    Globulin 3.7 01/17/2017 11:05 AM    A-G Ratio 1.0 01/17/2017 11:05 AM    AST (SGOT) 10 01/17/2017 11:05 AM    ALT (SGPT) 18 01/17/2017 11:05 AM     Lab Results   Component Value Date/Time    WBC 5.2 01/17/2017 11:05 AM    HGB 9.3 01/17/2017 11:05 AM    HCT 28.7 01/17/2017 11:05 AM    PLATELET 328 40/75/7484 11:05 AM       ASSESSMENT and PLAN:  Latha Mena is tolerating radiation as anticipated for the current dose and fraction. We will continue on as planned with another treatment visit anticipated next week.         Dorann Ahumada, MD   February 6, 2017

## 2017-02-07 ENCOUNTER — HOSPITAL ENCOUNTER (OUTPATIENT)
Dept: INFUSION THERAPY | Age: 62
Discharge: HOME | End: 2017-02-07
Payer: OTHER GOVERNMENT

## 2017-02-07 ENCOUNTER — APPOINTMENT (OUTPATIENT)
Dept: RADIATION ONCOLOGY | Age: 62
End: 2017-02-07
Payer: OTHER GOVERNMENT

## 2017-02-07 ENCOUNTER — HOSPITAL ENCOUNTER (OUTPATIENT)
Dept: RADIATION ONCOLOGY | Age: 62
Discharge: HOME | End: 2017-02-07
Payer: OTHER GOVERNMENT

## 2017-02-07 ENCOUNTER — HOSPITAL ENCOUNTER (OUTPATIENT)
Dept: LAB | Age: 62
Discharge: HOME | End: 2017-02-07
Payer: OTHER GOVERNMENT

## 2017-02-07 ENCOUNTER — PATIENT OUTREACH (OUTPATIENT)
Dept: CASE MANAGEMENT | Age: 62
End: 2017-02-07

## 2017-02-07 DIAGNOSIS — C50.911 INFILTRATING DUCTAL CARCINOMA OF RIGHT BREAST (HCC): ICD-10-CM

## 2017-02-07 LAB
ALBUMIN SERPL BCP-MCNC: 3.8 G/DL (ref 3.2–4.6)
ALBUMIN/GLOB SERPL: 1 {RATIO} (ref 1.2–3.5)
ALP SERPL-CCNC: 77 U/L (ref 50–136)
ALT SERPL-CCNC: 16 U/L (ref 12–65)
ANION GAP BLD CALC-SCNC: 4 MMOL/L (ref 7–16)
AST SERPL W P-5'-P-CCNC: 13 U/L (ref 15–37)
BASOPHILS # BLD AUTO: 0 K/UL (ref 0–0.2)
BASOPHILS # BLD: 0 % (ref 0–2)
BILIRUB SERPL-MCNC: 0.3 MG/DL (ref 0.2–1.1)
BUN SERPL-MCNC: 18 MG/DL (ref 8–23)
CALCIUM SERPL-MCNC: 9.3 MG/DL (ref 8.3–10.4)
CHLORIDE SERPL-SCNC: 107 MMOL/L (ref 98–107)
CO2 SERPL-SCNC: 30 MMOL/L (ref 23–32)
CREAT SERPL-MCNC: 0.84 MG/DL (ref 0.6–1)
DIFFERENTIAL METHOD BLD: ABNORMAL
EOSINOPHIL # BLD: 0.2 K/UL (ref 0–0.8)
EOSINOPHIL NFR BLD: 3 % (ref 0.5–7.8)
ERYTHROCYTE [DISTWIDTH] IN BLOOD BY AUTOMATED COUNT: 14.2 % (ref 11.9–14.6)
GLOBULIN SER CALC-MCNC: 3.8 G/DL (ref 2.3–3.5)
GLUCOSE SERPL-MCNC: 115 MG/DL (ref 65–100)
HCT VFR BLD AUTO: 29.7 % (ref 35.8–46.3)
HGB BLD-MCNC: 9.5 G/DL (ref 11.7–15.4)
LYMPHOCYTES # BLD AUTO: 36 % (ref 13–44)
LYMPHOCYTES # BLD: 1.8 K/UL (ref 0.5–4.6)
MCH RBC QN AUTO: 30.1 PG (ref 26.1–32.9)
MCHC RBC AUTO-ENTMCNC: 32 G/DL (ref 31.4–35)
MCV RBC AUTO: 94 FL (ref 79.6–97.8)
MONOCYTES # BLD: 0.5 K/UL (ref 0.1–1.3)
MONOCYTES NFR BLD AUTO: 10 % (ref 4–12)
NEUTS SEG # BLD: 2.6 K/UL (ref 1.7–8.2)
NEUTS SEG NFR BLD AUTO: 51 % (ref 43–78)
NRBC # BLD: 0 K/UL (ref 0–0.2)
PLATELET # BLD AUTO: 269 K/UL (ref 150–450)
PMV BLD AUTO: 9.1 FL (ref 10.8–14.1)
POTASSIUM SERPL-SCNC: 3.8 MMOL/L (ref 3.5–5.1)
PROT SERPL-MCNC: 7.6 G/DL (ref 6.3–8.2)
RBC # BLD AUTO: 3.16 M/UL (ref 4.05–5.25)
SODIUM SERPL-SCNC: 141 MMOL/L (ref 136–145)
WBC # BLD AUTO: 5.1 K/UL (ref 4.3–11.1)

## 2017-02-07 PROCEDURE — 74011250637 HC RX REV CODE- 250/637: Performed by: NURSE PRACTITIONER

## 2017-02-07 PROCEDURE — 96375 TX/PRO/DX INJ NEW DRUG ADDON: CPT

## 2017-02-07 PROCEDURE — 74011250636 HC RX REV CODE- 250/636: Performed by: NURSE PRACTITIONER

## 2017-02-07 PROCEDURE — 80053 COMPREHEN METABOLIC PANEL: CPT | Performed by: NURSE PRACTITIONER

## 2017-02-07 PROCEDURE — 77412 RADIATION TX DELIVERY LVL 3: CPT

## 2017-02-07 PROCEDURE — 77336 RADIATION PHYSICS CONSULT: CPT

## 2017-02-07 PROCEDURE — 85025 COMPLETE CBC W/AUTO DIFF WBC: CPT | Performed by: NURSE PRACTITIONER

## 2017-02-07 PROCEDURE — 96413 CHEMO IV INFUSION 1 HR: CPT

## 2017-02-07 RX ORDER — DIPHENHYDRAMINE HYDROCHLORIDE 50 MG/ML
25 INJECTION, SOLUTION INTRAMUSCULAR; INTRAVENOUS ONCE
Status: COMPLETED | OUTPATIENT
Start: 2017-02-07 | End: 2017-02-07

## 2017-02-07 RX ORDER — HEPARIN 100 UNIT/ML
300-500 SYRINGE INTRAVENOUS AS NEEDED
Status: DISPENSED | OUTPATIENT
Start: 2017-02-07 | End: 2017-02-07

## 2017-02-07 RX ORDER — ACETAMINOPHEN 325 MG/1
650 TABLET ORAL ONCE
Status: COMPLETED | OUTPATIENT
Start: 2017-02-07 | End: 2017-02-07

## 2017-02-07 RX ORDER — SODIUM CHLORIDE 0.9 % (FLUSH) 0.9 %
10 SYRINGE (ML) INJECTION AS NEEDED
Status: ACTIVE | OUTPATIENT
Start: 2017-02-07 | End: 2017-02-07

## 2017-02-07 RX ADMIN — SODIUM CHLORIDE 500 ML: 900 INJECTION, SOLUTION INTRAVENOUS at 12:30

## 2017-02-07 RX ADMIN — SODIUM CHLORIDE, PRESERVATIVE FREE 500 UNITS: 5 INJECTION INTRAVENOUS at 13:35

## 2017-02-07 RX ADMIN — Medication 10 ML: at 12:25

## 2017-02-07 RX ADMIN — Medication 498 MG: at 12:55

## 2017-02-07 RX ADMIN — DIPHENHYDRAMINE HYDROCHLORIDE 25 MG: 50 INJECTION, SOLUTION INTRAMUSCULAR; INTRAVENOUS at 12:25

## 2017-02-07 RX ADMIN — Medication 10 ML: at 13:35

## 2017-02-07 RX ADMIN — ACETAMINOPHEN 650 MG: 325 TABLET, FILM COATED ORAL at 12:23

## 2017-02-07 NOTE — ACP (ADVANCE CARE PLANNING)
2/7/17  Saw pt with Srikanth Galvan NP for visit prior to herceptin. Started daily radiation and has received 4/25 fractions and planning boost of 5 when completed. 2d echo completed and showed EF of 55-60%. Pt reports that she is experiencing loose stools but controlled with imodium. Fingernails and toenails are sore and some are lifting off nail beds. None have fallen off yet. Instructed to soak in epsom salt and warm water. Infusion scheduled for herceptin in 3 weeks and 6 weeks and then will have f/u with Dr. Lasha Rodriguez in 9 weeks prior to treatment. Encouraged to call with questions or concerns. Navigation will continue to follow.

## 2017-02-08 ENCOUNTER — HOSPITAL ENCOUNTER (OUTPATIENT)
Dept: RADIATION ONCOLOGY | Age: 62
Discharge: HOME | End: 2017-02-08
Payer: OTHER GOVERNMENT

## 2017-02-08 ENCOUNTER — APPOINTMENT (OUTPATIENT)
Dept: RADIATION ONCOLOGY | Age: 62
End: 2017-02-08
Payer: OTHER GOVERNMENT

## 2017-02-08 PROCEDURE — 77417 THER RADIOLOGY PORT IMAGE(S): CPT

## 2017-02-08 PROCEDURE — 77412 RADIATION TX DELIVERY LVL 3: CPT

## 2017-02-08 NOTE — PROGRESS NOTES
Arrived to the [facility]. Herceptin completed. Patient tolerated well. Any issues or concerns during appointment: NO.  Patient aware of next infusion appointment on 02/28/17 (date) at [phone] (time). Discharged ambulatory.

## 2017-02-09 ENCOUNTER — APPOINTMENT (OUTPATIENT)
Dept: RADIATION ONCOLOGY | Age: 62
End: 2017-02-09
Payer: OTHER GOVERNMENT

## 2017-02-09 ENCOUNTER — HOSPITAL ENCOUNTER (OUTPATIENT)
Dept: RADIATION ONCOLOGY | Age: 62
Discharge: HOME | End: 2017-02-09
Payer: OTHER GOVERNMENT

## 2017-02-09 PROCEDURE — 77412 RADIATION TX DELIVERY LVL 3: CPT

## 2017-02-10 ENCOUNTER — APPOINTMENT (OUTPATIENT)
Dept: RADIATION ONCOLOGY | Age: 62
End: 2017-02-10
Payer: OTHER GOVERNMENT

## 2017-02-10 ENCOUNTER — HOSPITAL ENCOUNTER (OUTPATIENT)
Dept: RADIATION ONCOLOGY | Age: 62
Discharge: HOME | End: 2017-02-10
Payer: OTHER GOVERNMENT

## 2017-02-10 PROCEDURE — 77412 RADIATION TX DELIVERY LVL 3: CPT

## 2017-02-13 ENCOUNTER — APPOINTMENT (OUTPATIENT)
Dept: RADIATION ONCOLOGY | Age: 62
End: 2017-02-13
Payer: OTHER GOVERNMENT

## 2017-02-13 ENCOUNTER — HOSPITAL ENCOUNTER (OUTPATIENT)
Dept: RADIATION ONCOLOGY | Age: 62
Discharge: HOME | End: 2017-02-13
Payer: OTHER GOVERNMENT

## 2017-02-13 PROCEDURE — 77412 RADIATION TX DELIVERY LVL 3: CPT

## 2017-02-13 NOTE — PROGRESS NOTES
SW received referral from MD to assist pt with family stressors. Pt known to SW previously. SW met with pt who stated frustration and that she feels overwhelmed with care for her mother. She requested assistance with hospice for her mother, stating \"I'm not sure what I'm doing. \" She reported she contacted her mother's MD in December 2016 to get a referral to hospice but did not get a response. SW provided psychosocial support and encouraged pt to call her mother's MD during visit. Pt called and was told she receive a call back today. THADDEUS provided pt with contact information for several local hospices and encouraged pt to contact them to have pt assessed. Pt verbalized understanding. SW provided pt with SW contact information and encouraged her to call with any questions or concerns. Pt verbalized understanding. THADDEUS intends to follow up PRN.

## 2017-02-14 ENCOUNTER — HOSPITAL ENCOUNTER (OUTPATIENT)
Dept: RADIATION ONCOLOGY | Age: 62
Discharge: HOME | End: 2017-02-14
Payer: OTHER GOVERNMENT

## 2017-02-14 ENCOUNTER — APPOINTMENT (OUTPATIENT)
Dept: RADIATION ONCOLOGY | Age: 62
End: 2017-02-14
Payer: OTHER GOVERNMENT

## 2017-02-14 PROCEDURE — 77412 RADIATION TX DELIVERY LVL 3: CPT

## 2017-02-14 PROCEDURE — 77336 RADIATION PHYSICS CONSULT: CPT

## 2017-02-15 ENCOUNTER — HOSPITAL ENCOUNTER (OUTPATIENT)
Dept: RADIATION ONCOLOGY | Age: 62
Discharge: HOME | End: 2017-02-15
Payer: OTHER GOVERNMENT

## 2017-02-15 ENCOUNTER — APPOINTMENT (OUTPATIENT)
Dept: RADIATION ONCOLOGY | Age: 62
End: 2017-02-15
Payer: OTHER GOVERNMENT

## 2017-02-15 PROCEDURE — 77417 THER RADIOLOGY PORT IMAGE(S): CPT

## 2017-02-15 PROCEDURE — 77412 RADIATION TX DELIVERY LVL 3: CPT

## 2017-02-16 ENCOUNTER — APPOINTMENT (OUTPATIENT)
Dept: RADIATION ONCOLOGY | Age: 62
End: 2017-02-16
Payer: OTHER GOVERNMENT

## 2017-02-16 ENCOUNTER — HOSPITAL ENCOUNTER (OUTPATIENT)
Dept: RADIATION ONCOLOGY | Age: 62
Discharge: HOME | End: 2017-02-16
Payer: OTHER GOVERNMENT

## 2017-02-16 PROCEDURE — 77412 RADIATION TX DELIVERY LVL 3: CPT

## 2017-02-16 PROCEDURE — 77334 RADIATION TREATMENT AID(S): CPT

## 2017-02-16 PROCEDURE — 77321 SPECIAL TELETX PORT PLAN: CPT

## 2017-02-16 PROCEDURE — 77290 THER RAD SIMULAJ FIELD CPLX: CPT

## 2017-02-17 ENCOUNTER — APPOINTMENT (OUTPATIENT)
Dept: RADIATION ONCOLOGY | Age: 62
End: 2017-02-17
Payer: OTHER GOVERNMENT

## 2017-02-17 ENCOUNTER — HOSPITAL ENCOUNTER (OUTPATIENT)
Dept: RADIATION ONCOLOGY | Age: 62
Discharge: HOME | End: 2017-02-17
Payer: OTHER GOVERNMENT

## 2017-02-17 PROCEDURE — 77412 RADIATION TX DELIVERY LVL 3: CPT

## 2017-02-20 ENCOUNTER — APPOINTMENT (OUTPATIENT)
Dept: RADIATION ONCOLOGY | Age: 62
End: 2017-02-20
Payer: OTHER GOVERNMENT

## 2017-02-20 ENCOUNTER — HOSPITAL ENCOUNTER (OUTPATIENT)
Dept: RADIATION ONCOLOGY | Age: 62
Discharge: HOME | End: 2017-02-20
Payer: OTHER GOVERNMENT

## 2017-02-20 PROCEDURE — 77412 RADIATION TX DELIVERY LVL 3: CPT

## 2017-02-20 NOTE — PROGRESS NOTES
Patient: Nina Rosales MRN: [id number]  SSN: [id number]    YOB: 1955  Age: 62 y.o. Sex: female      DIAGNOSIS: Right sided [id number] Stage IA invasive ductal carcinoma, two foci of disease, largest 0.7 cm. 92% ER, 7% ID, HER2 3+ positive     PREVIOUS TREATMENT:  1) 8/26/16: Right breast lumpectomy   2) 9/23/16: Re-resection and SLN biopsy   3) Weekly paclitaxel + trastuzumab for 12 weeks, followed by Herceptin monotherapy to complete a year    TREATMENT SITE:  Right breast    DOSE and FRACTIONATION:  14/25 fractions, 2800 cGy of 5000 cGy planned, 0/5 boost.    INTERVAL HISTORY:  Nina Rosales is a 62 y.o. female being treated for breast cancer. She was doing well without complaints week 1. Week 2 very emotional over the challenges at home, specifically a mother with end stage stage alzheimers and  with more care from cardiac issues. This was  overwhelming to the point she is asking about quitting but no longer felt this way after week 3. OBJECTIVE:  No findings. There were no vitals taken for this visit.     Lab Results   Component Value Date/Time    Sodium 141 02/07/2017 10:20 AM    Potassium 3.8 02/07/2017 10:20 AM    Chloride 107 02/07/2017 10:20 AM    CO2 30 02/07/2017 10:20 AM    Anion gap 4 02/07/2017 10:20 AM    Glucose 115 02/07/2017 10:20 AM    BUN 18 02/07/2017 10:20 AM    Creatinine 0.84 02/07/2017 10:20 AM    GFR est AA >60 02/07/2017 10:20 AM    GFR est non-AA >60 02/07/2017 10:20 AM    Calcium 9.3 02/07/2017 10:20 AM    Magnesium 2.1 11/01/2016 10:40 AM    Albumin 3.8 02/07/2017 10:20 AM    Protein, total 7.6 02/07/2017 10:20 AM    Globulin 3.8 02/07/2017 10:20 AM    A-G Ratio 1.0 02/07/2017 10:20 AM    AST (SGOT) 13 02/07/2017 10:20 AM    ALT (SGPT) 16 02/07/2017 10:20 AM     Lab Results   Component Value Date/Time    WBC 5.1 02/07/2017 10:20 AM    HGB 9.5 02/07/2017 10:20 AM    HCT 29.7 02/07/2017 10:20 AM    PLATELET 027 17/97/3696 10:20 AM       ASSESSMENT and PLAN:  Maykel Freedman is tolerating radiation as anticipated for the current dose and fraction. Encouraged a great deal and attempted to expan importance of treatment and balance in her life week 2 and felt better week 3. We will continue on as planned with another treatment visit anticipated next week.         Amrit Bartlett MD   February 20, 2017

## 2017-02-21 ENCOUNTER — HOSPITAL ENCOUNTER (OUTPATIENT)
Dept: RADIATION ONCOLOGY | Age: 62
Discharge: HOME | End: 2017-02-21
Payer: OTHER GOVERNMENT

## 2017-02-21 ENCOUNTER — APPOINTMENT (OUTPATIENT)
Dept: RADIATION ONCOLOGY | Age: 62
End: 2017-02-21
Payer: OTHER GOVERNMENT

## 2017-02-21 PROCEDURE — 77412 RADIATION TX DELIVERY LVL 3: CPT

## 2017-02-21 PROCEDURE — 77336 RADIATION PHYSICS CONSULT: CPT

## 2017-02-22 ENCOUNTER — HOSPITAL ENCOUNTER (OUTPATIENT)
Dept: RADIATION ONCOLOGY | Age: 62
Discharge: HOME | End: 2017-02-22
Payer: OTHER GOVERNMENT

## 2017-02-22 ENCOUNTER — APPOINTMENT (OUTPATIENT)
Dept: RADIATION ONCOLOGY | Age: 62
End: 2017-02-22
Payer: OTHER GOVERNMENT

## 2017-02-22 PROCEDURE — 77331 SPECIAL RADIATION DOSIMETRY: CPT

## 2017-02-22 PROCEDURE — 77417 THER RADIOLOGY PORT IMAGE(S): CPT

## 2017-02-22 PROCEDURE — 77412 RADIATION TX DELIVERY LVL 3: CPT

## 2017-02-23 ENCOUNTER — HOSPITAL ENCOUNTER (OUTPATIENT)
Dept: RADIATION ONCOLOGY | Age: 62
Discharge: HOME | End: 2017-02-23
Payer: OTHER GOVERNMENT

## 2017-02-23 ENCOUNTER — APPOINTMENT (OUTPATIENT)
Dept: RADIATION ONCOLOGY | Age: 62
End: 2017-02-23
Payer: OTHER GOVERNMENT

## 2017-02-23 PROCEDURE — 77412 RADIATION TX DELIVERY LVL 3: CPT

## 2017-02-24 ENCOUNTER — APPOINTMENT (OUTPATIENT)
Dept: RADIATION ONCOLOGY | Age: 62
End: 2017-02-24
Payer: OTHER GOVERNMENT

## 2017-02-24 ENCOUNTER — HOSPITAL ENCOUNTER (OUTPATIENT)
Dept: RADIATION ONCOLOGY | Age: 62
Discharge: HOME | End: 2017-02-24
Payer: OTHER GOVERNMENT

## 2017-02-24 PROCEDURE — 77412 RADIATION TX DELIVERY LVL 3: CPT

## 2017-02-27 ENCOUNTER — HOSPITAL ENCOUNTER (OUTPATIENT)
Dept: RADIATION ONCOLOGY | Age: 62
Discharge: HOME | End: 2017-02-27
Payer: OTHER GOVERNMENT

## 2017-02-27 ENCOUNTER — APPOINTMENT (OUTPATIENT)
Dept: RADIATION ONCOLOGY | Age: 62
End: 2017-02-27
Payer: OTHER GOVERNMENT

## 2017-02-27 PROCEDURE — 77412 RADIATION TX DELIVERY LVL 3: CPT

## 2017-02-27 NOTE — PROGRESS NOTES
Patient: Nina Rosales MRN: [id number]  SSN: [id number]    YOB: 1955  Age: 62 y.o. Sex: female      DIAGNOSIS: Right sided [id number] Stage IA invasive ductal carcinoma, two foci of disease, largest 0.7 cm. 92% ER, 7% MD, HER2 3+ positive     PREVIOUS TREATMENT:  1) 8/26/16: Right breast lumpectomy   2) 9/23/16: Re-resection and SLN biopsy   3) Weekly paclitaxel + trastuzumab for 12 weeks, followed by Herceptin monotherapy to complete a year    TREATMENT SITE:  Right breast    DOSE and FRACTIONATION:  19/25 fractions, 3800 cGy of 5000 cGy planned, 0/5 boost.    INTERVAL HISTORY:  Nina Rosales is a 62 y.o. female being treated for breast cancer. She was doing well without complaints week 1. Week 2 very emotional over the challenges at home, specifically a mother with end stage stage alzheimers and  with more care from cardiac issues. This was  overwhelming to the point she is asking about quitting but no longer felt this way after week 3. Week 4 patient is in better spirits with minimal treatment related side effects. There is no moist desquamation. OBJECTIVE:  No findings. There were no vitals taken for this visit.     Lab Results   Component Value Date/Time    Sodium 141 02/07/2017 10:20 AM    Potassium 3.8 02/07/2017 10:20 AM    Chloride 107 02/07/2017 10:20 AM    CO2 30 02/07/2017 10:20 AM    Anion gap 4 02/07/2017 10:20 AM    Glucose 115 02/07/2017 10:20 AM    BUN 18 02/07/2017 10:20 AM    Creatinine 0.84 02/07/2017 10:20 AM    GFR est AA >60 02/07/2017 10:20 AM    GFR est non-AA >60 02/07/2017 10:20 AM    Calcium 9.3 02/07/2017 10:20 AM    Magnesium 2.1 11/01/2016 10:40 AM    Albumin 3.8 02/07/2017 10:20 AM    Protein, total 7.6 02/07/2017 10:20 AM    Globulin 3.8 02/07/2017 10:20 AM    A-G Ratio 1.0 02/07/2017 10:20 AM    AST (SGOT) 13 02/07/2017 10:20 AM    ALT (SGPT) 16 02/07/2017 10:20 AM     Lab Results   Component Value Date/Time    WBC 5.1 02/07/2017 10:20 AM    HGB 9.5 02/07/2017 10:20 AM    HCT 29.7 02/07/2017 10:20 AM    PLATELET 075 54/48/1813 10:20 AM       ASSESSMENT and PLAN:  Ingrid Patton is tolerating radiation as anticipated for the current dose and fraction. Encouraged a great deal and attempted to expan importance of treatment and balance in her life week 2 and felt better week 3. We will continue on as planned with another treatment visit anticipated next week.   Week for using aloe vera/Aquaphor       Mart Cano MD   February 27, 2017

## 2017-02-28 ENCOUNTER — HOSPITAL ENCOUNTER (OUTPATIENT)
Dept: INFUSION THERAPY | Age: 62
Discharge: HOME | End: 2017-02-28
Payer: OTHER GOVERNMENT

## 2017-02-28 ENCOUNTER — HOSPITAL ENCOUNTER (OUTPATIENT)
Dept: RADIATION ONCOLOGY | Age: 62
Discharge: HOME | End: 2017-02-28
Payer: OTHER GOVERNMENT

## 2017-02-28 ENCOUNTER — APPOINTMENT (OUTPATIENT)
Dept: RADIATION ONCOLOGY | Age: 62
End: 2017-02-28
Payer: OTHER GOVERNMENT

## 2017-02-28 VITALS
RESPIRATION RATE: 18 BRPM | OXYGEN SATURATION: 96 % | WEIGHT: 187.2 LBS | HEIGHT: 63 IN | DIASTOLIC BLOOD PRESSURE: 87 MMHG | BODY MASS INDEX: 33.17 KG/M2 | HEART RATE: 83 BPM | TEMPERATURE: 98 F | SYSTOLIC BLOOD PRESSURE: 147 MMHG

## 2017-02-28 DIAGNOSIS — C50.911 INFILTRATING DUCTAL CARCINOMA OF RIGHT BREAST (HCC): ICD-10-CM

## 2017-02-28 LAB
ALBUMIN SERPL BCP-MCNC: 3.9 G/DL (ref 3.2–4.6)
ALBUMIN/GLOB SERPL: 0.9 {RATIO} (ref 1.2–3.5)
ALP SERPL-CCNC: 78 U/L (ref 50–136)
ALT SERPL-CCNC: 18 U/L (ref 12–65)
ANION GAP BLD CALC-SCNC: 7 MMOL/L (ref 7–16)
AST SERPL W P-5'-P-CCNC: 13 U/L (ref 15–37)
BASOPHILS # BLD AUTO: 0 K/UL (ref 0–0.2)
BASOPHILS # BLD: 1 % (ref 0–2)
BILIRUB SERPL-MCNC: 0.3 MG/DL (ref 0.2–1.1)
BUN SERPL-MCNC: 15 MG/DL (ref 8–23)
CALCIUM SERPL-MCNC: 9.5 MG/DL (ref 8.3–10.4)
CHLORIDE SERPL-SCNC: 105 MMOL/L (ref 98–107)
CO2 SERPL-SCNC: 28 MMOL/L (ref 23–32)
CREAT SERPL-MCNC: 0.81 MG/DL (ref 0.6–1)
DIFFERENTIAL METHOD BLD: ABNORMAL
EOSINOPHIL # BLD: 0.2 K/UL (ref 0–0.8)
EOSINOPHIL NFR BLD: 4 % (ref 0.5–7.8)
ERYTHROCYTE [DISTWIDTH] IN BLOOD BY AUTOMATED COUNT: 13.5 % (ref 11.9–14.6)
GLOBULIN SER CALC-MCNC: 4.2 G/DL (ref 2.3–3.5)
GLUCOSE SERPL-MCNC: 101 MG/DL (ref 65–100)
HCT VFR BLD AUTO: 30 % (ref 35.8–46.3)
HGB BLD-MCNC: 9.6 G/DL (ref 11.7–15.4)
LYMPHOCYTES # BLD AUTO: 29 % (ref 13–44)
LYMPHOCYTES # BLD: 1.2 K/UL (ref 0.5–4.6)
MCH RBC QN AUTO: 29.6 PG (ref 26.1–32.9)
MCHC RBC AUTO-ENTMCNC: 32 G/DL (ref 31.4–35)
MCV RBC AUTO: 92.6 FL (ref 79.6–97.8)
MONOCYTES # BLD: 0.4 K/UL (ref 0.1–1.3)
MONOCYTES NFR BLD AUTO: 10 % (ref 4–12)
NEUTS SEG # BLD: 2.4 K/UL (ref 1.7–8.2)
NEUTS SEG NFR BLD AUTO: 57 % (ref 43–78)
NRBC # BLD: 0 K/UL (ref 0–0.2)
PLATELET # BLD AUTO: 289 K/UL (ref 150–450)
PMV BLD AUTO: 9.1 FL (ref 10.8–14.1)
POTASSIUM SERPL-SCNC: 3.7 MMOL/L (ref 3.5–5.1)
PROT SERPL-MCNC: 8.1 G/DL (ref 6.3–8.2)
RBC # BLD AUTO: 3.24 M/UL (ref 4.05–5.25)
SODIUM SERPL-SCNC: 140 MMOL/L (ref 136–145)
WBC # BLD AUTO: 4.1 K/UL (ref 4.3–11.1)

## 2017-02-28 PROCEDURE — 96375 TX/PRO/DX INJ NEW DRUG ADDON: CPT

## 2017-02-28 PROCEDURE — 77336 RADIATION PHYSICS CONSULT: CPT

## 2017-02-28 PROCEDURE — 96413 CHEMO IV INFUSION 1 HR: CPT

## 2017-02-28 PROCEDURE — 74011250637 HC RX REV CODE- 250/637: Performed by: NURSE PRACTITIONER

## 2017-02-28 PROCEDURE — 80053 COMPREHEN METABOLIC PANEL: CPT | Performed by: INTERNAL MEDICINE

## 2017-02-28 PROCEDURE — 77412 RADIATION TX DELIVERY LVL 3: CPT

## 2017-02-28 PROCEDURE — 85025 COMPLETE CBC W/AUTO DIFF WBC: CPT | Performed by: INTERNAL MEDICINE

## 2017-02-28 PROCEDURE — 74011250636 HC RX REV CODE- 250/636: Performed by: NURSE PRACTITIONER

## 2017-02-28 PROCEDURE — 77030003560 HC NDL HUBR BARD -A

## 2017-02-28 RX ORDER — EPINEPHRINE 1 MG/ML
0.3 INJECTION, SOLUTION, CONCENTRATE INTRAVENOUS AS NEEDED
Status: CANCELLED | OUTPATIENT
Start: 2017-02-28

## 2017-02-28 RX ORDER — HEPARIN SODIUM 1000 [USP'U]/ML
2000 INJECTION, SOLUTION INTRAVENOUS; SUBCUTANEOUS AS NEEDED
Status: CANCELLED
Start: 2017-02-28

## 2017-02-28 RX ORDER — SODIUM CHLORIDE 0.9 % (FLUSH) 0.9 %
10 SYRINGE (ML) INJECTION AS NEEDED
Status: ACTIVE | OUTPATIENT
Start: 2017-02-28 | End: 2017-02-28

## 2017-02-28 RX ORDER — DIPHENHYDRAMINE HYDROCHLORIDE 50 MG/ML
50 INJECTION, SOLUTION INTRAMUSCULAR; INTRAVENOUS AS NEEDED
Status: CANCELLED
Start: 2017-02-28

## 2017-02-28 RX ORDER — ALBUTEROL SULFATE 0.83 MG/ML
2.5 SOLUTION RESPIRATORY (INHALATION) AS NEEDED
Status: CANCELLED
Start: 2017-02-28

## 2017-02-28 RX ORDER — DIPHENHYDRAMINE HYDROCHLORIDE 50 MG/ML
25 INJECTION, SOLUTION INTRAMUSCULAR; INTRAVENOUS ONCE
Status: COMPLETED | OUTPATIENT
Start: 2017-02-28 | End: 2017-02-28

## 2017-02-28 RX ORDER — ACETAMINOPHEN 325 MG/1
650 TABLET ORAL ONCE
Status: COMPLETED | OUTPATIENT
Start: 2017-02-28 | End: 2017-02-28

## 2017-02-28 RX ORDER — ONDANSETRON 2 MG/ML
8 INJECTION INTRAMUSCULAR; INTRAVENOUS AS NEEDED
Status: CANCELLED | OUTPATIENT
Start: 2017-02-28

## 2017-02-28 RX ORDER — HYDROCORTISONE SODIUM SUCCINATE 100 MG/2ML
100 INJECTION, POWDER, FOR SOLUTION INTRAMUSCULAR; INTRAVENOUS AS NEEDED
Status: CANCELLED | OUTPATIENT
Start: 2017-02-28

## 2017-02-28 RX ORDER — HEPARIN 100 UNIT/ML
300-500 SYRINGE INTRAVENOUS AS NEEDED
Status: DISPENSED | OUTPATIENT
Start: 2017-02-28 | End: 2017-02-28

## 2017-02-28 RX ORDER — ACETAMINOPHEN 325 MG/1
650 TABLET ORAL AS NEEDED
Status: CANCELLED
Start: 2017-02-28

## 2017-02-28 RX ADMIN — SODIUM CHLORIDE, PRESERVATIVE FREE 500 UNITS: 5 INJECTION INTRAVENOUS at 12:03

## 2017-02-28 RX ADMIN — DIPHENHYDRAMINE HYDROCHLORIDE 25 MG: 50 INJECTION, SOLUTION INTRAMUSCULAR; INTRAVENOUS at 10:46

## 2017-02-28 RX ADMIN — SODIUM CHLORIDE 500 ML: 900 INJECTION, SOLUTION INTRAVENOUS at 10:50

## 2017-02-28 RX ADMIN — ACETAMINOPHEN 650 MG: 325 TABLET, FILM COATED ORAL at 10:46

## 2017-02-28 RX ADMIN — Medication 498 MG: at 11:24

## 2017-03-01 ENCOUNTER — HOSPITAL ENCOUNTER (OUTPATIENT)
Dept: RADIATION ONCOLOGY | Age: 62
Discharge: HOME | End: 2017-03-01
Payer: OTHER GOVERNMENT

## 2017-03-01 ENCOUNTER — APPOINTMENT (OUTPATIENT)
Dept: RADIATION ONCOLOGY | Age: 62
End: 2017-03-01
Payer: OTHER GOVERNMENT

## 2017-03-01 PROCEDURE — 77412 RADIATION TX DELIVERY LVL 3: CPT

## 2017-03-01 PROCEDURE — 77417 THER RADIOLOGY PORT IMAGE(S): CPT

## 2017-03-02 ENCOUNTER — HOSPITAL ENCOUNTER (OUTPATIENT)
Dept: RADIATION ONCOLOGY | Age: 62
Discharge: HOME | End: 2017-03-02
Payer: OTHER GOVERNMENT

## 2017-03-02 ENCOUNTER — APPOINTMENT (OUTPATIENT)
Dept: RADIATION ONCOLOGY | Age: 62
End: 2017-03-02
Payer: OTHER GOVERNMENT

## 2017-03-02 PROCEDURE — 77412 RADIATION TX DELIVERY LVL 3: CPT

## 2017-03-03 ENCOUNTER — APPOINTMENT (OUTPATIENT)
Dept: RADIATION ONCOLOGY | Age: 62
End: 2017-03-03
Payer: OTHER GOVERNMENT

## 2017-03-03 ENCOUNTER — HOSPITAL ENCOUNTER (OUTPATIENT)
Dept: RADIATION ONCOLOGY | Age: 62
Discharge: HOME | End: 2017-03-03
Payer: OTHER GOVERNMENT

## 2017-03-03 PROCEDURE — 77412 RADIATION TX DELIVERY LVL 3: CPT

## 2017-03-03 NOTE — PROGRESS NOTES
Arrived to the [facility]. Herceptin completed. Patient tolerated well. Any issues or concerns during appointment: none. Patient aware of next infusion appointment on 3/21 (date) at 56 (time). Discharged ambulatory accompanied by friend.

## 2017-03-06 ENCOUNTER — APPOINTMENT (OUTPATIENT)
Dept: RADIATION ONCOLOGY | Age: 62
End: 2017-03-06
Payer: OTHER GOVERNMENT

## 2017-03-06 ENCOUNTER — HOSPITAL ENCOUNTER (OUTPATIENT)
Dept: RADIATION ONCOLOGY | Age: 62
Discharge: HOME | End: 2017-03-06
Payer: OTHER GOVERNMENT

## 2017-03-06 PROCEDURE — 77412 RADIATION TX DELIVERY LVL 3: CPT

## 2017-03-06 NOTE — PROGRESS NOTES
Patient: Gopi Abraham MRN: [id number]  SSN: [id number]    YOB: 1955  Age: 62 y.o. Sex: female      DIAGNOSIS: Right sided [id number] Stage IA invasive ductal carcinoma, two foci of disease, largest 0.7 cm. 92% ER, 7% VT, HER2 3+ positive     PREVIOUS TREATMENT:  1) 8/26/16: Right breast lumpectomy   2) 9/23/16: Re-resection and SLN biopsy   3) Weekly paclitaxel + trastuzumab for 12 weeks, followed by Herceptin monotherapy to complete a year    TREATMENT SITE:  Right breast    DOSE and FRACTIONATION:  24/25 fractions, 4800 cGy of 5000 cGy planned, 0/5 boost.    INTERVAL HISTORY:  Gopi Abraham is a 62 y.o. female being treated for breast cancer. She was doing well without complaints week 1. Week 2 very emotional over the challenges at home, specifically a mother with end stage stage alzheimers and  with more care from cardiac issues. This was  overwhelming to the point she is asking about quitting but no longer felt this way after week 3. Week 4 patient is in better spirits with minimal treatment related side effects. There is no moist desquamation. Week 5 with tanning and irritation in the axilla. OBJECTIVE:  Dark tanning in the axilla. There were no vitals taken for this visit.     Lab Results   Component Value Date/Time    Sodium 140 02/28/2017 10:00 AM    Potassium 3.7 02/28/2017 10:00 AM    Chloride 105 02/28/2017 10:00 AM    CO2 28 02/28/2017 10:00 AM    Anion gap 7 02/28/2017 10:00 AM    Glucose 101 02/28/2017 10:00 AM    BUN 15 02/28/2017 10:00 AM    Creatinine 0.81 02/28/2017 10:00 AM    GFR est AA >60 02/28/2017 10:00 AM    GFR est non-AA >60 02/28/2017 10:00 AM    Calcium 9.5 02/28/2017 10:00 AM    Magnesium 2.1 11/01/2016 10:40 AM    Albumin 3.9 02/28/2017 10:00 AM    Protein, total 8.1 02/28/2017 10:00 AM    Globulin 4.2 02/28/2017 10:00 AM    A-G Ratio 0.9 02/28/2017 10:00 AM    AST (SGOT) 13 02/28/2017 10:00 AM    ALT (SGPT) 18 02/28/2017 10:00 AM     Lab Results   Component Value Date/Time    WBC 4.1 02/28/2017 10:00 AM    HGB 9.6 02/28/2017 10:00 AM    HCT 30.0 02/28/2017 10:00 AM    PLATELET 414 45/09/1100 10:00 AM       ASSESSMENT and PLAN:  Rg Kumar is tolerating radiation as anticipated for the current dose and fraction. Encouraged a great deal and attempted to expan importance of treatment and balance in her life week 2 and felt better week 3. We will continue on as planned with another treatment visit anticipated next week.         Jeannette Branham MD   March 6, 2017

## 2017-03-07 ENCOUNTER — HOSPITAL ENCOUNTER (OUTPATIENT)
Dept: RADIATION ONCOLOGY | Age: 62
Discharge: HOME | End: 2017-03-07
Payer: OTHER GOVERNMENT

## 2017-03-07 ENCOUNTER — APPOINTMENT (OUTPATIENT)
Dept: RADIATION ONCOLOGY | Age: 62
End: 2017-03-07
Payer: OTHER GOVERNMENT

## 2017-03-07 PROCEDURE — 77412 RADIATION TX DELIVERY LVL 3: CPT

## 2017-03-07 PROCEDURE — 77280 THER RAD SIMULAJ FIELD SMPL: CPT

## 2017-03-07 PROCEDURE — 77336 RADIATION PHYSICS CONSULT: CPT

## 2017-03-08 ENCOUNTER — HOSPITAL ENCOUNTER (OUTPATIENT)
Dept: RADIATION ONCOLOGY | Age: 62
Discharge: HOME | End: 2017-03-08
Payer: OTHER GOVERNMENT

## 2017-03-08 PROCEDURE — 77412 RADIATION TX DELIVERY LVL 3: CPT

## 2017-03-09 ENCOUNTER — HOSPITAL ENCOUNTER (OUTPATIENT)
Dept: RADIATION ONCOLOGY | Age: 62
Discharge: HOME | End: 2017-03-09
Payer: OTHER GOVERNMENT

## 2017-03-09 PROCEDURE — 77412 RADIATION TX DELIVERY LVL 3: CPT

## 2017-03-10 ENCOUNTER — HOSPITAL ENCOUNTER (OUTPATIENT)
Dept: RADIATION ONCOLOGY | Age: 62
Discharge: HOME | End: 2017-03-10
Payer: OTHER GOVERNMENT

## 2017-03-10 PROCEDURE — 77412 RADIATION TX DELIVERY LVL 3: CPT

## 2017-03-13 ENCOUNTER — HOSPITAL ENCOUNTER (OUTPATIENT)
Dept: RADIATION ONCOLOGY | Age: 62
Discharge: HOME | End: 2017-03-13
Payer: OTHER GOVERNMENT

## 2017-03-13 PROCEDURE — 77412 RADIATION TX DELIVERY LVL 3: CPT

## 2017-03-13 NOTE — PROGRESS NOTES
Patient: Jessica Tate MRN: [id number]  SSN: [id number]    YOB: 1955  Age: 62 y.o. Sex: female      DIAGNOSIS: Right sided [id number] Stage IA invasive ductal carcinoma, two foci of disease, largest 0.7 cm. 92% ER, 7% KS, HER2 3+ positive     PREVIOUS TREATMENT:  1) 8/26/16: Right breast lumpectomy   2) 9/23/16: Re-resection and SLN biopsy   3) Weekly paclitaxel + trastuzumab for 12 weeks, followed by Herceptin monotherapy to complete a year    TREATMENT SITE:  Right breast    DOSE and FRACTIONATION:  25/25 fractions, 5000 cGy of 5000 cGy planned, 4/5 boost, 800 cGy of 1000 cGy planned. INTERVAL HISTORY:  Jessica Tate is a 62 y.o. female being treated for breast cancer. She was doing well without complaints week 1. Week 2 very emotional over the challenges at home, specifically a mother with end stage stage alzheimers and  with more care from cardiac issues. This was  overwhelming to the point she is asking about quitting but no longer felt this way after week 3. Week 4 patient is in better spirits with minimal treatment related side effects. There is no moist desquamation. Week 5 with tanning and irritation in the axilla. No changes week 6. OBJECTIVE:  Dark tanning in the axilla. There were no vitals taken for this visit.     Lab Results   Component Value Date/Time    Sodium 140 02/28/2017 10:00 AM    Potassium 3.7 02/28/2017 10:00 AM    Chloride 105 02/28/2017 10:00 AM    CO2 28 02/28/2017 10:00 AM    Anion gap 7 02/28/2017 10:00 AM    Glucose 101 02/28/2017 10:00 AM    BUN 15 02/28/2017 10:00 AM    Creatinine 0.81 02/28/2017 10:00 AM    GFR est AA >60 02/28/2017 10:00 AM    GFR est non-AA >60 02/28/2017 10:00 AM    Calcium 9.5 02/28/2017 10:00 AM    Magnesium 2.1 11/01/2016 10:40 AM    Albumin 3.9 02/28/2017 10:00 AM    Protein, total 8.1 02/28/2017 10:00 AM    Globulin 4.2 02/28/2017 10:00 AM    A-G Ratio 0.9 02/28/2017 10:00 AM    AST (SGOT) 13 02/28/2017 10:00 AM ALT (SGPT) 18 02/28/2017 10:00 AM     Lab Results   Component Value Date/Time    WBC 4.1 02/28/2017 10:00 AM    HGB 9.6 02/28/2017 10:00 AM    HCT 30.0 02/28/2017 10:00 AM    PLATELET 072 72/88/2265 10:00 AM       ASSESSMENT and PLAN:  Lemuel Lopez is tolerating radiation as anticipated for the current dose and fraction. Encouraged a great deal and attempted to expan importance of treatment and balance in her life week 2 and felt better week 3. We will continue on as planned with with treatment to be completed tomorrow. Will plan for 1 month follow up.         Wale Lares MD   March 13, 2017

## 2017-03-14 ENCOUNTER — HOSPITAL ENCOUNTER (OUTPATIENT)
Dept: RADIATION ONCOLOGY | Age: 62
Discharge: HOME | End: 2017-03-14
Payer: OTHER GOVERNMENT

## 2017-03-14 PROCEDURE — 77412 RADIATION TX DELIVERY LVL 3: CPT

## 2017-03-14 PROCEDURE — 77336 RADIATION PHYSICS CONSULT: CPT

## 2017-03-21 ENCOUNTER — APPOINTMENT (OUTPATIENT)
Dept: INFUSION THERAPY | Age: 62
End: 2017-03-21
Payer: OTHER GOVERNMENT

## 2017-03-21 ENCOUNTER — HOSPITAL ENCOUNTER (OUTPATIENT)
Dept: LAB | Age: 62
Discharge: HOME | End: 2017-03-21
Payer: OTHER GOVERNMENT

## 2017-03-21 ENCOUNTER — HOSPITAL ENCOUNTER (OUTPATIENT)
Dept: INFUSION THERAPY | Age: 62
Discharge: HOME | End: 2017-03-21
Payer: OTHER GOVERNMENT

## 2017-03-21 ENCOUNTER — PATIENT OUTREACH (OUTPATIENT)
Dept: CASE MANAGEMENT | Age: 62
End: 2017-03-21

## 2017-03-21 DIAGNOSIS — C50.911 INFILTRATING DUCTAL CARCINOMA OF RIGHT BREAST (HCC): ICD-10-CM

## 2017-03-21 LAB
ALBUMIN SERPL BCP-MCNC: 3.8 G/DL (ref 3.2–4.6)
ALBUMIN/GLOB SERPL: 1 {RATIO} (ref 1.2–3.5)
ALP SERPL-CCNC: 78 U/L (ref 50–136)
ALT SERPL-CCNC: 19 U/L (ref 12–65)
ANION GAP BLD CALC-SCNC: 6 MMOL/L (ref 7–16)
AST SERPL W P-5'-P-CCNC: 13 U/L (ref 15–37)
BASOPHILS # BLD AUTO: 0 K/UL (ref 0–0.2)
BASOPHILS # BLD: 1 % (ref 0–2)
BILIRUB SERPL-MCNC: 0.2 MG/DL (ref 0.2–1.1)
BUN SERPL-MCNC: 15 MG/DL (ref 8–23)
CALCIUM SERPL-MCNC: 9.4 MG/DL (ref 8.3–10.4)
CHLORIDE SERPL-SCNC: 106 MMOL/L (ref 98–107)
CO2 SERPL-SCNC: 30 MMOL/L (ref 23–32)
CREAT SERPL-MCNC: 0.94 MG/DL (ref 0.6–1)
DIFFERENTIAL METHOD BLD: ABNORMAL
EOSINOPHIL # BLD: 0.1 K/UL (ref 0–0.8)
EOSINOPHIL NFR BLD: 3 % (ref 0.5–7.8)
ERYTHROCYTE [DISTWIDTH] IN BLOOD BY AUTOMATED COUNT: 13.3 % (ref 11.9–14.6)
GLOBULIN SER CALC-MCNC: 4 G/DL (ref 2.3–3.5)
GLUCOSE SERPL-MCNC: 99 MG/DL (ref 65–100)
HCT VFR BLD AUTO: 30.7 % (ref 35.8–46.3)
HGB BLD-MCNC: 9.9 G/DL (ref 11.7–15.4)
LYMPHOCYTES # BLD AUTO: 26 % (ref 13–44)
LYMPHOCYTES # BLD: 1 K/UL (ref 0.5–4.6)
MCH RBC QN AUTO: 29.6 PG (ref 26.1–32.9)
MCHC RBC AUTO-ENTMCNC: 32.2 G/DL (ref 31.4–35)
MCV RBC AUTO: 91.6 FL (ref 79.6–97.8)
MONOCYTES # BLD: 0.5 K/UL (ref 0.1–1.3)
MONOCYTES NFR BLD AUTO: 12 % (ref 4–12)
NEUTS SEG # BLD: 2.4 K/UL (ref 1.7–8.2)
NEUTS SEG NFR BLD AUTO: 59 % (ref 43–78)
NRBC # BLD: 0 K/UL (ref 0–0.2)
PLATELET # BLD AUTO: 260 K/UL (ref 150–450)
PMV BLD AUTO: 8.8 FL (ref 10.8–14.1)
POTASSIUM SERPL-SCNC: 3.8 MMOL/L (ref 3.5–5.1)
PROT SERPL-MCNC: 7.8 G/DL (ref 6.3–8.2)
RBC # BLD AUTO: 3.35 M/UL (ref 4.05–5.25)
SODIUM SERPL-SCNC: 142 MMOL/L (ref 136–145)
WBC # BLD AUTO: 4.1 K/UL (ref 4.3–11.1)

## 2017-03-21 PROCEDURE — 80053 COMPREHEN METABOLIC PANEL: CPT | Performed by: INTERNAL MEDICINE

## 2017-03-21 PROCEDURE — 74011250637 HC RX REV CODE- 250/637: Performed by: INTERNAL MEDICINE

## 2017-03-21 PROCEDURE — 96375 TX/PRO/DX INJ NEW DRUG ADDON: CPT

## 2017-03-21 PROCEDURE — 96413 CHEMO IV INFUSION 1 HR: CPT

## 2017-03-21 PROCEDURE — 74011250636 HC RX REV CODE- 250/636: Performed by: INTERNAL MEDICINE

## 2017-03-21 PROCEDURE — 85025 COMPLETE CBC W/AUTO DIFF WBC: CPT | Performed by: INTERNAL MEDICINE

## 2017-03-21 RX ORDER — DIPHENHYDRAMINE HYDROCHLORIDE 50 MG/ML
25 INJECTION, SOLUTION INTRAMUSCULAR; INTRAVENOUS ONCE
Status: COMPLETED | OUTPATIENT
Start: 2017-03-21 | End: 2017-03-21

## 2017-03-21 RX ORDER — HEPARIN 100 UNIT/ML
300-500 SYRINGE INTRAVENOUS AS NEEDED
Status: DISPENSED | OUTPATIENT
Start: 2017-03-21 | End: 2017-03-21

## 2017-03-21 RX ORDER — SODIUM CHLORIDE 0.9 % (FLUSH) 0.9 %
10 SYRINGE (ML) INJECTION AS NEEDED
Status: ACTIVE | OUTPATIENT
Start: 2017-03-21 | End: 2017-03-21

## 2017-03-21 RX ORDER — ACETAMINOPHEN 325 MG/1
650 TABLET ORAL ONCE
Status: COMPLETED | OUTPATIENT
Start: 2017-03-21 | End: 2017-03-21

## 2017-03-21 RX ADMIN — SODIUM CHLORIDE 500 ML: 900 INJECTION, SOLUTION INTRAVENOUS at 10:55

## 2017-03-21 RX ADMIN — ACETAMINOPHEN 650 MG: 325 TABLET, FILM COATED ORAL at 11:00

## 2017-03-21 RX ADMIN — DIPHENHYDRAMINE HYDROCHLORIDE 25 MG: 50 INJECTION, SOLUTION INTRAMUSCULAR; INTRAVENOUS at 11:03

## 2017-03-21 RX ADMIN — Medication 10 ML: at 10:55

## 2017-03-21 RX ADMIN — TRASTUZUMAB 498 MG: KIT at 11:41

## 2017-03-21 RX ADMIN — Medication 10 ML: at 12:20

## 2017-03-21 RX ADMIN — SODIUM CHLORIDE, PRESERVATIVE FREE 500 UNITS: 5 INJECTION INTRAVENOUS at 12:20

## 2017-03-21 NOTE — ACP (ADVANCE CARE PLANNING)
3/21/17 saw pt today with Dr. Howard Fleming for follow up breast cancer and herceptin. She completed radiation on 3/14. She is using some lotions per radiation for changes to skin. Discussion on arimidex - see pt instructions. Continue herceptin every 3 weeks. Follow up with NP in 6 weeks. Encouraged to call with any concerns. Navigation will continue to follow.

## 2017-04-06 ENCOUNTER — HOSPITAL ENCOUNTER (OUTPATIENT)
Dept: MAMMOGRAPHY | Age: 62
Discharge: HOME | End: 2017-04-06
Attending: INTERNAL MEDICINE
Payer: OTHER GOVERNMENT

## 2017-04-06 ENCOUNTER — HOSPITAL ENCOUNTER (OUTPATIENT)
Dept: NON INVASIVE DIAGNOSTICS | Age: 62
Discharge: HOME | End: 2017-04-06
Attending: INTERNAL MEDICINE
Payer: OTHER GOVERNMENT

## 2017-04-06 DIAGNOSIS — N95.1 MENOPAUSAL STATE: ICD-10-CM

## 2017-04-06 DIAGNOSIS — Z79.899 ENCOUNTER FOR MONITORING CARDIOTOXIC DRUG THERAPY: ICD-10-CM

## 2017-04-06 DIAGNOSIS — Z51.81 ENCOUNTER FOR MONITORING CARDIOTOXIC DRUG THERAPY: ICD-10-CM

## 2017-04-06 PROCEDURE — 77080 DXA BONE DENSITY AXIAL: CPT

## 2017-04-06 PROCEDURE — 93306 TTE W/DOPPLER COMPLETE: CPT

## 2017-04-11 ENCOUNTER — HOSPITAL ENCOUNTER (OUTPATIENT)
Dept: INFUSION THERAPY | Age: 62
Discharge: HOME | End: 2017-04-11
Payer: OTHER GOVERNMENT

## 2017-04-11 VITALS
TEMPERATURE: 98.6 F | DIASTOLIC BLOOD PRESSURE: 91 MMHG | RESPIRATION RATE: 18 BRPM | HEART RATE: 95 BPM | SYSTOLIC BLOOD PRESSURE: 133 MMHG | BODY MASS INDEX: 32.77 KG/M2 | OXYGEN SATURATION: 96 % | WEIGHT: 185 LBS

## 2017-04-11 DIAGNOSIS — C50.911 INFILTRATING DUCTAL CARCINOMA OF RIGHT BREAST (HCC): ICD-10-CM

## 2017-04-11 PROCEDURE — 96361 HYDRATE IV INFUSION ADD-ON: CPT

## 2017-04-11 PROCEDURE — 96413 CHEMO IV INFUSION 1 HR: CPT

## 2017-04-11 PROCEDURE — 74011250636 HC RX REV CODE- 250/636: Performed by: INTERNAL MEDICINE

## 2017-04-11 PROCEDURE — 77030003560 HC NDL HUBR BARD -A

## 2017-04-11 PROCEDURE — 96375 TX/PRO/DX INJ NEW DRUG ADDON: CPT

## 2017-04-11 PROCEDURE — 74011250637 HC RX REV CODE- 250/637: Performed by: INTERNAL MEDICINE

## 2017-04-11 RX ORDER — SODIUM CHLORIDE 0.9 % (FLUSH) 0.9 %
10 SYRINGE (ML) INJECTION AS NEEDED
Status: ACTIVE | OUTPATIENT
Start: 2017-04-11 | End: 2017-04-12

## 2017-04-11 RX ORDER — HEPARIN 100 UNIT/ML
300-500 SYRINGE INTRAVENOUS AS NEEDED
Status: DISPENSED | OUTPATIENT
Start: 2017-04-11 | End: 2017-04-12

## 2017-04-11 RX ORDER — DIPHENHYDRAMINE HYDROCHLORIDE 50 MG/ML
25 INJECTION, SOLUTION INTRAMUSCULAR; INTRAVENOUS ONCE
Status: COMPLETED | OUTPATIENT
Start: 2017-04-11 | End: 2017-04-11

## 2017-04-11 RX ORDER — ACETAMINOPHEN 325 MG/1
650 TABLET ORAL ONCE
Status: COMPLETED | OUTPATIENT
Start: 2017-04-11 | End: 2017-04-11

## 2017-04-11 RX ADMIN — SODIUM CHLORIDE, PRESERVATIVE FREE 500 UNITS: 5 INJECTION INTRAVENOUS at 14:50

## 2017-04-11 RX ADMIN — ACETAMINOPHEN 650 MG: 325 TABLET, FILM COATED ORAL at 13:25

## 2017-04-11 RX ADMIN — SODIUM CHLORIDE 500 ML: 900 INJECTION, SOLUTION INTRAVENOUS at 13:22

## 2017-04-11 RX ADMIN — Medication 10 ML: at 14:50

## 2017-04-11 RX ADMIN — Medication 498 MG: at 14:10

## 2017-04-11 RX ADMIN — DIPHENHYDRAMINE HYDROCHLORIDE 25 MG: 50 INJECTION, SOLUTION INTRAMUSCULAR; INTRAVENOUS at 13:21

## 2017-04-11 RX ADMIN — Medication 10 ML: at 13:20

## 2017-04-11 NOTE — PROGRESS NOTES
Arrived to the [facility]. Herceptin completed. Patient tolerated well. Any issues or concerns during appointment: none. Patient aware of next infusion appointment on 5/2/17 at 11:15am.  Discharged ambulatory.

## 2017-04-18 ENCOUNTER — APPOINTMENT (OUTPATIENT)
Dept: RADIATION ONCOLOGY | Age: 62
End: 2017-04-18
Payer: OTHER GOVERNMENT

## 2017-04-20 ENCOUNTER — HOSPITAL ENCOUNTER (OUTPATIENT)
Dept: RADIATION ONCOLOGY | Age: 62
Discharge: HOME | End: 2017-04-20
Payer: OTHER GOVERNMENT

## 2017-04-20 VITALS
SYSTOLIC BLOOD PRESSURE: 142 MMHG | DIASTOLIC BLOOD PRESSURE: 90 MMHG | WEIGHT: 187.1 LBS | TEMPERATURE: 98.7 F | BODY MASS INDEX: 33.14 KG/M2 | OXYGEN SATURATION: 100 % | HEART RATE: 85 BPM

## 2017-04-20 DIAGNOSIS — C50.921 MALIGNANT NEOPLASM OF RIGHT MALE BREAST, UNSPECIFIED SITE OF BREAST: Primary | ICD-10-CM

## 2017-04-20 PROCEDURE — 99211 OFF/OP EST MAY X REQ PHY/QHP: CPT

## 2017-04-20 NOTE — NURSE NAVIGATOR
F/u right breast cancer. S/p lumpectomy x 2, chemo, RT. On Herceptin and Arimidex. F/u Dr. Abraham Marinelli 5-2-17. Bone density 4-6-17-Osteopenia. Taking calcium and vitamin D. Last screening mammogram was 6-24-16. Pt c/o right axilla feeling like rubber band being pulled. C/o frequent nose bleeds. Primary  Prescribed baby asa for hypertension. I instructed pt to discuss nosebleeds with primary.     Laurie Hoang RN

## 2017-04-20 NOTE — CONSULTS
Patient: Willian Patel MRN: [id number]  SSN: [id number]    YOB: 1955  Age: 62 y.o. Sex: female      Other Providers:  Grace Hays MD    CHIEF COMPLAINT: Breast cancer    DIAGNOSIS: Right sided [id number] Stage IA invasive ductal carcinoma, two foci of disease, largest 0.7 cm.  92% ER, 7% FL, HER2 3+ positive    PREVIOUS TREATMENT:  1) 8/26/16:  Right breast lumpectomy   2) 9/23/16: Re-resection and SLN biopsy   3) Weekly paclitaxel + trastuzumab for 12 weeks, followed by Herceptin monotherapy to complete a year  4) Radiation of the right breast with 25 fractions of 5000 cGy, 5 boost of 1000 cGy. Treatment dates: 02/-1/17 through 03/14/17  5) AI, Arimidex: March 2017    HISTORY OF PRESENT ILLNESS:  Willian Patel is a 62 y.o. female seen by Dr. Dominique Quigley 01/12/17 at the request of Grace Hays MD.  She had a screening mammogram in June 2016 that showed a developing group of calcifications in the upper outer right breast. She completed biopsy which showed DCIS. MRI was also completed showing a 9 mm enhancing mass. She was referred for surgical management and underwent lumpectomy on 8/26/16. Pathology review surprisingly showed two foci of invasive ductal carcinoma in a background of DCIS. The larger of the two foci was 0.7 cm, and receptors showed 92% ER and 7% FL, but also showed HER2 3+ positive. She went back for sentinel node biopsy and reexcision, both of which were negative for residual carcinoma. She was referred to medical oncology for adjuvant recommendations and saw Dr. Nidia Dumont who felt she would benefit from APT regimen with weekly paclitaxel + trastuzumab for 12 weeks, followed by Herceptin monotherapy to complete a year. She will also be receiving endocrine therapy with AI. She was seen by Dr. Nidia Dumont prior to her final chemotherapy and noted nausea and fatigue from chemo but was manageable. No fevers, chills or other infectious symptoms.  Neuropathy grade 1, stable and not interfering with ADLs. Pain in the tips of her nails, but not interfering with ADLs. With the completion of chemo, she was referred for adjuvant radiation recommendations. INTERVAL HISTORY:  She returns today for follow up, one month after completing radiation therapy for her right breast invasive ductal carcinoma. She tolerated radiation well. She complains of some occasional \"pulling\" under the right axilla and tanning of the skin, otherwise doing well. She continues on monotherapy Herceptin under the direction of Dr. Karla Kramer. She was also started on Arimidex after completion of radiation. She complains of some mood changes and joints aches. Her energy continues to improve. She denies cough or dyspnea. OBSTETRIC HISTORY:    Menarche at the age of 14.    G3,P1. Oral Contraceptive Pills:  Around 15 years  Hormone Replacement Therapy:  Premarin in June and took only briefly    PAST MEDICAL HISTORY:    Past Medical History:   Diagnosis Date    Anemia >20yrs ago    [facility]) Dx 10/2016    right breast cancer    Ductal carcinoma in situ (DCIS) of right breast 7/2016    Enlarged uterus 5/31/2016    Hypertension     managed with medication    Murmur, functional     diagnosed as teenager    Obesity 5/31/2016    Paroxysmal atrial fibrillation ([facility] [address] 09/11/2001    Psychiatric disorder     anxiety    Sleep apnea 09/11/2013    can not tolerate c pap    Vaginal atrophy 5/31/2016       The patient denies history of collagen vascular diseases, pacemaker insertion, prior radiation or prior chemotherapy.      PAST SURGICAL HISTORY:   Past Surgical History:   Procedure Laterality Date    HX BREAST BIOPSY Right 8/26/2016    RIGHT BREAST NEEDLE LOCALIZED BIOPSY performed by Karina Bearden MD     HX BREAST LUMPECTOMY Right 8/26/2016    RIGHT BREAST LUMPECTOMY performed by Karina Bearden MD     HX BREAST LUMPECTOMY Right 9/23/2016    RIGHT BREAST LUMPECTOMY/PARTIAL REVISION  performed by Karina Bearden    HX COLONOSCOPY  2007    diverticulosis    HX ORTHOPAEDIC      left wrist ORIF    HX TUBAL LIGATION      HX VASCULAR ACCESS      NELA BIOPSY BREAST STEREOTACTIC Right 7.19.16       MEDICATIONS:     Current Outpatient Prescriptions:     sertraline (ZOLOFT) 100 mg tablet, TAKE 1 TABLET DAILY (CANCEL SERTRALINE 50MG), Disp: 90 Tab, Rfl: 0    anastrozole (ARIMIDEX) 1 mg tablet, Take 1 Tab by mouth daily. , Disp: 30 Tab, Rfl: 5    lidocaine-prilocaine (EMLA) topical cream, Apply  to affected area as needed for Pain. Apply to port site 45-60 minutes prior to lab appt or infusion. , Disp: 30 g, Rfl: 1    LOPERAMIDE HCL (IMODIUM PO), Take  by mouth as needed. , Disp: , Rfl:     DIPHENHYDRAMINE HCL (BENADRYL PO), Take  by mouth as needed. , Disp: , Rfl:     ondansetron hcl (ZOFRAN, AS HYDROCHLORIDE,) 8 mg tablet, Take 1 Tab by mouth every eight (8) hours as needed for Nausea., Disp: 90 Tab, Rfl: 2    LORazepam (ATIVAN) 0.5 mg tablet, Take 1 Tab by mouth daily as needed for Anxiety. (Patient taking differently: Take 0.5 mg by mouth as needed for Anxiety.), Disp: 30 Tab, Rfl: 1    hydroCHLOROthiazide (HYDRODIURIL) 25 mg tablet, Take 1 Tab by mouth daily. , Disp: 90 Tab, Rfl: 1    lisinopril (PRINIVIL, ZESTRIL) 40 mg tablet, Take 1 Tab by mouth daily. , Disp: 90 Tab, Rfl: 1    atorvastatin (LIPITOR) 40 mg tablet, Take 1 Tab by mouth nightly., Disp: 90 Tab, Rfl: 1    amLODIPine (NORVASC) 10 mg tablet, Take 1 Tab by mouth daily. , Disp: 90 Tab, Rfl: 1    aspirin (JESSICA CHEWABLE ASPIRIN) 81 mg chewable tablet, Take 81 mg by mouth daily. , Disp: , Rfl:     ALLERGIES:   Allergies   Allergen Reactions    Pcn [Penicillins] Hives       SOCIAL HISTORY:   Social History     Social History    Marital status:      Spouse name: N/A    Number of children: N/A    Years of education: N/A     Occupational History    Not on file.      Social History Main Topics    Smoking status: Never Smoker    Smokeless tobacco: Never Used   [employer] Alcohol use No    Drug use: No    Sexual activity: Not on file     Other Topics Concern    Not on file     Social History Narrative    Lives at home with , Katherine Douglas, and her mother. Homemaker. FAMILY HISTORY:   Family History   Problem Relation Age of Onset    Diabetes Mother    Yady Strickland Alzheimer Mother     No Known Problems Father        REVIEW OF SYSTEMS: Please see the completed review of systems sheet in the chart that I have reviewed today. PHYSICAL EXAMINATION:   ECOG Performance status 0  VITAL SIGNS:   Visit Vitals    /90 (BP 1 Location: Left arm, BP Patient Position: Sitting)    Pulse 85    Temp 98.7 °F (37.1 °C) (Oral)    Wt 187 lb 1.6 oz (84.9 kg)    SpO2 100%    BMI 33.14 kg/m2        GENERAL: The patient is well-developed, ambulatory, alert and in no acute distress. HEENT: Head is normocephalic, atraumatic. CARDIOVASCULAR: Heart is regular rate and rhythm. RESPIRATORY: Lungs are clear to auscultation. There is normal respiratory effort. LYMPHATIC: There is no cervical, supraclavicular or axillary lymphadenopathy bilaterally. BREASTS: Examination of the unaffected breast reveals no dominant nodules or masses. The lumpectomy cavity appears well healed with good aesthetics and symmetry. Well healed surgical incision. Tanning noted of the skin without erythema/desquamation. PATHOLOGY:    8/26/16:     DIAGNOSIS   RIGHT BREAST LOCALIZED WIDE EXCISION: TWO FOCI OF INFILTRATING DUCT CARCINOMA, LOW GRADE (WELL DIFFERENTIATED) IN A BACKGROUND OF MULTIFOCAL DUCTAL CARCINOMA IN-SITU, HIGH GRADE (CRIBRIFORM AND SOLID TYPE). ONE FOCUS OF INFILTRATING CARCINOMA IS APPROXIMATELY 0.7 X 0.4 CM ON SLIDE AND IS APPROXIMATELY 0.5 CM FROM MARKED ANTERIOR MARGIN. OTHER MARGINS ARE GREATER THAN 1 CM FROM THIS INFILTRATING TUMOR. SECOND FOCUS OF INFILTRATING CARCINOMA IS APPROXIMATELY 0.1 X 0.1 CM AND IS APPROXIMATELY 0.2 CM FROM MARKED POSTERIOR MARGIN.  DUCTAL CARCINOMA IN-SITU IS PRESENT LESS THAN 0.1 CM FROM MARKED ANTERIOR MARGIN AND APPROXIMATELY 0.1 CM FROM MARKED POSTERIOR AND MEDIAL MARGINS. OTHER MARGINS ARE GREATER THAN 1 CM FROM TUMOR (SEE [id number]). Interpretation   Clarient Diagnostic Result:   TEST NAME: RESULTS: INTERNAL CONTROLS:   ESTROGEN RECEPTOR: Positive (92%) Present and ER positive   PROGESTERONE RECEPTOR: Positive (7%) Present and ME positive   HER-2/JAGDISH: Positive (3+) Percent of cells with 3+ stain: 38%      ANATOMIC SITE: Right breast (presumably 10 o'clock position based on prior core biopsy). PROCEDURE: Needle localized wide excision. HISTOLOGIC TYPE: Infiltrating duct carcinoma. SIZE: Two foci, one approximately 0.7 x 0.4 cm and an apparent separate focus approximately 0.1 x 0.1 cm. UNIFOCAL OR MULTIFOCAL: Apparently multifocal.   PRESENCE OF SKIN, NIPPLE OR SKELETAL MUSCLE INVOLVEMENT: Not identified. ZARA MODIFICATION OF BLOOM-KAPADIA GRADE:   ARCHITECTURAL SCORE: 2/3. NUCLEAR SCORE: 2/3. MITOTIC SCORE: 1/3. TOTAL SCORE: 5/9 = Low Grade for both tumors. IN-SITU COMPONENT: Multifocal ductal carcinoma in-situ is noted, high grade (cribriform and solid type). LYMPHOVASCULAR INVASION: Not identified. ARE MICROCALCIFICATIONS IDENTIFIED: Within ductal carcinoma in-situ. TUMOR DISTANCE TO CLOSEST MARGIN: Ductal carcinoma in-situ is present less than 0.1 cm from marked anterior margin and approximately 0.1 cm from marked medial margin. Small focus of infiltrating carcinoma is present approximately 0.2 cm from marked deep margin. ANTERIOR (SUPERFICIAL): Ductal carcinoma in-situ is present less than 0.1 cm from marked anterior margin and larger focus of infiltrating carcinoma is present approximately 0.5 cm from marked anterior margin. POSTERIOR (DEEP):  Focus of ductal carcinoma in-situ is present approximately 0.1 cm from marked posterior margin and small focus of infiltrating carcinoma is present approximately 0.2 cm from marked posterior margin. MEDIAL: Focus of ductal carcinoma in-situ is present approximately 0.1 cm from marked medial margin. LATERAL: Greater than 1 cm. INFERIOR: Greater than 1 cm. SUPERIOR: Greater than 1 cm. LYMPH NODE STATUS: Does not apply. TOTAL NUMBER OF LYMPH NODES CONTAINING CARCINOMA: Does not apply. TOTAL NUMBER OF LYMPH NODES EXAMINED: Does not apply. METASTASIS: Does not apply. SIZE OF LARGEST POSITIVE NODE: Does not apply. EXTRA-CAPSULAR EXTENSION OF TUMOR: Does not apply. OTHER FINDINGS: Changes consistent with prior biopsy site. TNM CLASSIFICATION: pT1 pNX.     9/23/16:   DIAGNOSIS   A: POST LUMPECTOMY MARGIN RIGHT BREAST:   BENIGN BREAST TISSUE WITH FIBROCYSTIC MASTOPATHY AND FAT NECROSIS. NO RESIDUAL CARCINOMA IS IDENTIFIED. B: SENTINEL NODE RIGHT BREAST:   ONE LYMPH NODE NEGATIVE FOR METASTATIC     LABORATORY: none today    RADIOLOGY:    I have personally reviewed the imaging and agree with the reports below. 6/24/16:STUDY: Bilateral digital screening mammogram      INDICATION: Screening     COMPARISON: 12/21/2012     FINDINGS: Bilateral digital mammography was performed, and is interpreted in  conjunction with a computer assisted detection (CAD) system.      The breast parenchymal density is normal. There is developing calcification in  the upper outer right breast. No significant masses are identified. There is  no skin thickening or nipple retraction. There is no architectural distortion.     IMPRESSION: Developing calcification in the upper outer right breast.   Magnification views recommended.     BI-RADS Assessment Category 0: Incomplete: Needs additional imaging evaluation.     We will attempt to contact the patient and arrange this additional imaging.     BD2    MRI BILATERAL BREASTS WITHOUT AND WITH CONTRAST, 7/29/2016.      CLINICAL HISTORY: 52-year-old with new diagnosis of right DCIS.  Patient without  breast complaints.     Technique: Multiplanar multisequence imaging of the breast were performed prior  to and following the uneventful intravenous administration of 17 mL of  Magnevist. Postcontrast imaging was performed using a dynamic technique. Images  were reviewed using the [employer].      Sequences obtained: Sagittal T2, axial STIR, axial T1, and axial fat-saturated  T1-weighted images prior to and following administration of contrast.      Comparison studies: Bilateral mammogram 6/24/2016.      FINDINGS:   A skin marker has been placed at the 10:00 position of the left breast located  10 cm from the nipple marking the patient's biopsy site. The breasts are  composed of heterogeneous fibroglandular elements. No enlarged axillary lymph  nodes are seen. No enlarged internal mammary lymph nodes are seen. Evaluation of  the lungs is limited by MRI imaging. However, no obvious pulmonary masses are  seen. No significant pleural effusions are seen. The liver is obscured by  cardiac motion artifact and only partially visualized. A T2 hyperintense lesion  is suggested in the superior right lobe on axial STIR image 9 which likely  represents a small cyst.       Following the administration of intravenous contrast, normal enhancement is seen  of the heart and vascular structures of the breasts. Moderate background  physiologic enhancement of the breasts is seen. An enhancing biopsy tract is  seen extending from the lateral towards the central breast. The biopsy cavity is  felt to be best appreciated on axial postcontrast subtraction image 261, and  sagittal postcontrast reconstructed image 47. Just anterior and inferior to the  biopsy cavity, there is a 6 mm x 8 mm x 9 mm area of masslike enhancement best  appreciated on axial postcontrast subtraction image 259, and sagittal  reconstructed postcontrast image 48 which may represent a small area of invasive  disease.  This is felt to correspond to an adjacent nodular density as seen on  the prior diagnostic mammogram. Additional nodular enhancement is seen extending  anteriorly to the biopsy cavity by approximately 3.2 cm best appreciated on  sagittal reconstructed postcontrast image 46 for which additional noninvasive  disease cannot be excluded. These do appear to correspond to an additional faint  calcifications anterior to the biopsied cluster seen on the prior diagnostic  mammogram. Stereotactic biopsy of the additional calcifications can be  considered if this would change clinical management. No evidence of skin  thickening, or nipple retraction is seen.      IMPRESSION:  1. Post biopsy changes in the outer right breast. A small 9 mm enhancing mass,  and additional nonmasslike enhancement are seen anterior to the biopsy cavity as  described above suggesting potential multifocal disease. No evidence for  multicentric disease, or metastatic disease is otherwise seen.     BI-RADS Assessment Category 6: Known Biopsy Proven Malignancy    IMPRESSION:  Francisco Arreola is a 62 y.o. female with right sided [id number] Stage IA invasive ductal carcinoma, two foci of disease, largest 0.7 cm, 92% ER, 7% KS, HER2 3+ positive S/P right breast lumpectomy, 08/26/16 F/B Re-resection and SLN biopsy, 09/23/16. She completed weekly paclitaxel + trastuzumab for 12 weeks, and now on monotherapy of Herceptin to complete a year (10/17). She completed radiation therapy 03/14/17. She was then started on Arimidex under the direction of Dr. Benjamin Carrillo. Her dexa scan on 04/6/17 shows osteopenia. She is recovering as anticipated from radiation therapy. She complains of \"tightness/pulling\" around the right axilla, otherwise doing well. She has full ROM of her extremity. PLAN:   1) Reviewed potential side effects of radiation therapy and AIs  2) continue lotions to radiation field  3) continue SBE  4) add calcium and vitamin D - repeat dexa 04/19  5) schedule mammogram for late June and we will see her shortly thereafter.  We will then see her every 6 months x2 years then yearly, generally  7) AI under the direction of medical oncology, Dr. Michael Moreno, NP   April 20, 2017       Portions of this note were copied from prior encounters and reviewed for accuracy, currency, and represent documentation and tasks completed during this encounter. I verify and attest these portions to be unchanged from prior visits.

## 2017-05-02 ENCOUNTER — HOSPITAL ENCOUNTER (OUTPATIENT)
Dept: LAB | Age: 62
Discharge: HOME | End: 2017-05-02
Payer: OTHER GOVERNMENT

## 2017-05-02 ENCOUNTER — HOSPITAL ENCOUNTER (OUTPATIENT)
Dept: INFUSION THERAPY | Age: 62
Discharge: HOME | End: 2017-05-02
Payer: OTHER GOVERNMENT

## 2017-05-02 ENCOUNTER — PATIENT OUTREACH (OUTPATIENT)
Dept: CASE MANAGEMENT | Age: 62
End: 2017-05-02

## 2017-05-02 DIAGNOSIS — C50.911 INFILTRATING DUCTAL CARCINOMA OF RIGHT BREAST (HCC): ICD-10-CM

## 2017-05-02 LAB
ALBUMIN SERPL BCP-MCNC: 3.7 G/DL (ref 3.2–4.6)
ALBUMIN/GLOB SERPL: 0.9 {RATIO} (ref 1.2–3.5)
ALP SERPL-CCNC: 78 U/L (ref 50–136)
ALT SERPL-CCNC: 19 U/L (ref 12–65)
ANION GAP BLD CALC-SCNC: 5 MMOL/L (ref 7–16)
AST SERPL W P-5'-P-CCNC: 13 U/L (ref 15–37)
BASOPHILS # BLD AUTO: 0 K/UL (ref 0–0.2)
BASOPHILS # BLD: 1 % (ref 0–2)
BILIRUB SERPL-MCNC: 0.3 MG/DL (ref 0.2–1.1)
BUN SERPL-MCNC: 16 MG/DL (ref 8–23)
CALCIUM SERPL-MCNC: 9.6 MG/DL (ref 8.3–10.4)
CHLORIDE SERPL-SCNC: 108 MMOL/L (ref 98–107)
CO2 SERPL-SCNC: 29 MMOL/L (ref 21–32)
CREAT SERPL-MCNC: 0.86 MG/DL (ref 0.6–1)
DIFFERENTIAL METHOD BLD: ABNORMAL
EOSINOPHIL # BLD: 0.1 K/UL (ref 0–0.8)
EOSINOPHIL NFR BLD: 4 % (ref 0.5–7.8)
ERYTHROCYTE [DISTWIDTH] IN BLOOD BY AUTOMATED COUNT: 13.5 % (ref 11.9–14.6)
GLOBULIN SER CALC-MCNC: 3.9 G/DL (ref 2.3–3.5)
GLUCOSE SERPL-MCNC: 132 MG/DL (ref 65–100)
HCT VFR BLD AUTO: 30.9 % (ref 35.8–46.3)
HGB BLD-MCNC: 10 G/DL (ref 11.7–15.4)
LYMPHOCYTES # BLD AUTO: 28 % (ref 13–44)
LYMPHOCYTES # BLD: 1.1 K/UL (ref 0.5–4.6)
MCH RBC QN AUTO: 28.9 PG (ref 26.1–32.9)
MCHC RBC AUTO-ENTMCNC: 32.4 G/DL (ref 31.4–35)
MCV RBC AUTO: 89.3 FL (ref 79.6–97.8)
MONOCYTES # BLD: 0.4 K/UL (ref 0.1–1.3)
MONOCYTES NFR BLD AUTO: 9 % (ref 4–12)
NEUTS SEG # BLD: 2.4 K/UL (ref 1.7–8.2)
NEUTS SEG NFR BLD AUTO: 60 % (ref 43–78)
NRBC # BLD: 0 K/UL (ref 0–0.2)
PLATELET # BLD AUTO: 259 K/UL (ref 150–450)
PMV BLD AUTO: 9.1 FL (ref 10.8–14.1)
POTASSIUM SERPL-SCNC: 3.7 MMOL/L (ref 3.5–5.1)
PROT SERPL-MCNC: 7.6 G/DL (ref 6.3–8.2)
RBC # BLD AUTO: 3.46 M/UL (ref 4.05–5.25)
SODIUM SERPL-SCNC: 142 MMOL/L (ref 136–145)
WBC # BLD AUTO: 4 K/UL (ref 4.3–11.1)

## 2017-05-02 PROCEDURE — 80053 COMPREHEN METABOLIC PANEL: CPT | Performed by: NURSE PRACTITIONER

## 2017-05-02 PROCEDURE — 96413 CHEMO IV INFUSION 1 HR: CPT

## 2017-05-02 PROCEDURE — 74011250637 HC RX REV CODE- 250/637: Performed by: NURSE PRACTITIONER

## 2017-05-02 PROCEDURE — 85025 COMPLETE CBC W/AUTO DIFF WBC: CPT | Performed by: NURSE PRACTITIONER

## 2017-05-02 PROCEDURE — 96361 HYDRATE IV INFUSION ADD-ON: CPT

## 2017-05-02 PROCEDURE — 96375 TX/PRO/DX INJ NEW DRUG ADDON: CPT

## 2017-05-02 PROCEDURE — 74011250636 HC RX REV CODE- 250/636: Performed by: NURSE PRACTITIONER

## 2017-05-02 RX ORDER — SODIUM CHLORIDE 0.9 % (FLUSH) 0.9 %
10 SYRINGE (ML) INJECTION AS NEEDED
Status: ACTIVE | OUTPATIENT
Start: 2017-05-02 | End: 2017-05-02

## 2017-05-02 RX ORDER — HEPARIN 100 UNIT/ML
300-500 SYRINGE INTRAVENOUS AS NEEDED
Status: DISPENSED | OUTPATIENT
Start: 2017-05-02 | End: 2017-05-02

## 2017-05-02 RX ORDER — DIPHENHYDRAMINE HYDROCHLORIDE 50 MG/ML
25 INJECTION, SOLUTION INTRAMUSCULAR; INTRAVENOUS ONCE
Status: COMPLETED | OUTPATIENT
Start: 2017-05-02 | End: 2017-05-02

## 2017-05-02 RX ORDER — ACETAMINOPHEN 325 MG/1
650 TABLET ORAL ONCE
Status: COMPLETED | OUTPATIENT
Start: 2017-05-02 | End: 2017-05-02

## 2017-05-02 RX ADMIN — DIPHENHYDRAMINE HYDROCHLORIDE 25 MG: 50 INJECTION, SOLUTION INTRAMUSCULAR; INTRAVENOUS at 12:07

## 2017-05-02 RX ADMIN — Medication 10 ML: at 12:55

## 2017-05-02 RX ADMIN — SODIUM CHLORIDE 500 ML: 900 INJECTION, SOLUTION INTRAVENOUS at 11:35

## 2017-05-02 RX ADMIN — SODIUM CHLORIDE, PRESERVATIVE FREE 500 UNITS: 5 INJECTION INTRAVENOUS at 12:56

## 2017-05-02 RX ADMIN — ACETAMINOPHEN 650 MG: 325 TABLET, FILM COATED ORAL at 11:57

## 2017-05-02 RX ADMIN — Medication 498 MG: at 12:15

## 2017-05-02 NOTE — PROGRESS NOTES
5/2/17 saw pt today with Forest Mcmanus NP for follow up breast cancer and herceptin. She is having some discomfort in right axilla and breast.  Scheduled for right mammogram on 5/8. She is already being seen by oncology rehab. Some side effects from arimidex, she has been on this for about a month now - mood changes, aches, hot flashes. These are manageable at this point so we will continue arimidex for now. PO intake is good. Echo up to date and WNL. Encouraged to call with any concerns. Continue every 3 week herceptin and follow up in 6 weeks. Navigation will continue to follow.

## 2017-05-02 NOTE — PROGRESS NOTES
Arrived to the [facility] ambulatory. herceptin completed. Patient tolerated well. Any issues or concerns during appointment: no.  Patient aware of next infusion appointment on  5/23 at 1400. Discharged to home ambulatory with family.

## 2017-05-08 ENCOUNTER — HOSPITAL ENCOUNTER (OUTPATIENT)
Dept: MAMMOGRAPHY | Age: 62
Discharge: HOME | End: 2017-05-08
Attending: RADIOLOGY
Payer: OTHER GOVERNMENT

## 2017-05-08 DIAGNOSIS — C50.921 MALIGNANT NEOPLASM OF RIGHT MALE BREAST, UNSPECIFIED SITE OF BREAST: ICD-10-CM

## 2017-05-08 PROCEDURE — 77066 DX MAMMO INCL CAD BI: CPT

## 2017-05-10 PROBLEM — Z79.899 HIGH RISK MEDICATION USE: Status: ACTIVE | Noted: 2017-05-10

## 2017-05-23 ENCOUNTER — HOSPITAL ENCOUNTER (OUTPATIENT)
Dept: INFUSION THERAPY | Age: 62
Discharge: HOME | End: 2017-05-23
Payer: OTHER GOVERNMENT

## 2017-05-23 VITALS
WEIGHT: 188 LBS | DIASTOLIC BLOOD PRESSURE: 90 MMHG | RESPIRATION RATE: 18 BRPM | HEART RATE: 83 BPM | SYSTOLIC BLOOD PRESSURE: 134 MMHG | BODY MASS INDEX: 33.3 KG/M2 | TEMPERATURE: 98.9 F | OXYGEN SATURATION: 95 %

## 2017-05-23 DIAGNOSIS — C50.911 INFILTRATING DUCTAL CARCINOMA OF RIGHT BREAST (HCC): ICD-10-CM

## 2017-05-23 PROCEDURE — 74011250637 HC RX REV CODE- 250/637: Performed by: NURSE PRACTITIONER

## 2017-05-23 PROCEDURE — 96375 TX/PRO/DX INJ NEW DRUG ADDON: CPT

## 2017-05-23 PROCEDURE — 74011250636 HC RX REV CODE- 250/636: Performed by: NURSE PRACTITIONER

## 2017-05-23 PROCEDURE — 96413 CHEMO IV INFUSION 1 HR: CPT

## 2017-05-23 PROCEDURE — 77030003560 HC NDL HUBR BARD -A

## 2017-05-23 RX ORDER — HEPARIN 100 UNIT/ML
300-500 SYRINGE INTRAVENOUS AS NEEDED
Status: DISPENSED | OUTPATIENT
Start: 2017-05-23 | End: 2017-05-24

## 2017-05-23 RX ORDER — ONDANSETRON 2 MG/ML
8 INJECTION INTRAMUSCULAR; INTRAVENOUS AS NEEDED
Status: CANCELLED | OUTPATIENT
Start: 2017-05-23

## 2017-05-23 RX ORDER — HYDROCORTISONE SODIUM SUCCINATE 100 MG/2ML
100 INJECTION, POWDER, FOR SOLUTION INTRAMUSCULAR; INTRAVENOUS AS NEEDED
Status: CANCELLED | OUTPATIENT
Start: 2017-05-23

## 2017-05-23 RX ORDER — DIPHENHYDRAMINE HYDROCHLORIDE 50 MG/ML
50 INJECTION, SOLUTION INTRAMUSCULAR; INTRAVENOUS AS NEEDED
Status: CANCELLED
Start: 2017-05-23

## 2017-05-23 RX ORDER — EPINEPHRINE 1 MG/ML
0.3 INJECTION, SOLUTION, CONCENTRATE INTRAVENOUS AS NEEDED
Status: CANCELLED | OUTPATIENT
Start: 2017-05-23

## 2017-05-23 RX ORDER — ALBUTEROL SULFATE 0.83 MG/ML
2.5 SOLUTION RESPIRATORY (INHALATION) AS NEEDED
Status: CANCELLED
Start: 2017-05-23

## 2017-05-23 RX ORDER — HEPARIN SODIUM 1000 [USP'U]/ML
2000 INJECTION, SOLUTION INTRAVENOUS; SUBCUTANEOUS AS NEEDED
Status: CANCELLED
Start: 2017-05-23

## 2017-05-23 RX ORDER — SODIUM CHLORIDE 0.9 % (FLUSH) 0.9 %
10 SYRINGE (ML) INJECTION AS NEEDED
Status: ACTIVE | OUTPATIENT
Start: 2017-05-23 | End: 2017-05-24

## 2017-05-23 RX ORDER — ACETAMINOPHEN 325 MG/1
650 TABLET ORAL ONCE
Status: COMPLETED | OUTPATIENT
Start: 2017-05-23 | End: 2017-05-23

## 2017-05-23 RX ORDER — DIPHENHYDRAMINE HYDROCHLORIDE 50 MG/ML
25 INJECTION, SOLUTION INTRAMUSCULAR; INTRAVENOUS ONCE
Status: COMPLETED | OUTPATIENT
Start: 2017-05-23 | End: 2017-05-23

## 2017-05-23 RX ORDER — ACETAMINOPHEN 325 MG/1
650 TABLET ORAL AS NEEDED
Status: CANCELLED
Start: 2017-05-23

## 2017-05-23 RX ADMIN — ACETAMINOPHEN 650 MG: 325 TABLET, FILM COATED ORAL at 13:56

## 2017-05-23 RX ADMIN — DIPHENHYDRAMINE HYDROCHLORIDE 25 MG: 50 INJECTION, SOLUTION INTRAMUSCULAR; INTRAVENOUS at 13:56

## 2017-05-23 RX ADMIN — Medication 10 ML: at 14:45

## 2017-05-23 RX ADMIN — Medication 498 MG: at 14:15

## 2017-05-23 RX ADMIN — SODIUM CHLORIDE 500 ML: 900 INJECTION, SOLUTION INTRAVENOUS at 13:58

## 2017-05-23 RX ADMIN — Medication 500 UNITS: at 14:45

## 2017-06-13 ENCOUNTER — HOSPITAL ENCOUNTER (OUTPATIENT)
Dept: LAB | Age: 62
Discharge: HOME | End: 2017-06-13
Payer: OTHER GOVERNMENT

## 2017-06-13 ENCOUNTER — HOSPITAL ENCOUNTER (OUTPATIENT)
Dept: INFUSION THERAPY | Age: 62
Discharge: HOME | End: 2017-06-13
Payer: OTHER GOVERNMENT

## 2017-06-13 ENCOUNTER — PATIENT OUTREACH (OUTPATIENT)
Dept: CASE MANAGEMENT | Age: 62
End: 2017-06-13

## 2017-06-13 DIAGNOSIS — C50.911 INFILTRATING DUCTAL CARCINOMA OF RIGHT BREAST (HCC): ICD-10-CM

## 2017-06-13 LAB
ALBUMIN SERPL BCP-MCNC: 4 G/DL (ref 3.2–4.6)
ALBUMIN/GLOB SERPL: 1 {RATIO} (ref 1.2–3.5)
ALP SERPL-CCNC: 77 U/L (ref 50–136)
ALT SERPL-CCNC: 20 U/L (ref 12–65)
ANION GAP BLD CALC-SCNC: 7 MMOL/L (ref 7–16)
AST SERPL W P-5'-P-CCNC: 13 U/L (ref 15–37)
BASOPHILS # BLD AUTO: 0 K/UL (ref 0–0.2)
BASOPHILS # BLD: 0 % (ref 0–2)
BILIRUB SERPL-MCNC: 0.4 MG/DL (ref 0.2–1.1)
BUN SERPL-MCNC: 20 MG/DL (ref 8–23)
CALCIUM SERPL-MCNC: 10.3 MG/DL (ref 8.3–10.4)
CHLORIDE SERPL-SCNC: 104 MMOL/L (ref 98–107)
CO2 SERPL-SCNC: 29 MMOL/L (ref 21–32)
CREAT SERPL-MCNC: 0.8 MG/DL (ref 0.6–1)
DIFFERENTIAL METHOD BLD: ABNORMAL
EOSINOPHIL # BLD: 0.1 K/UL (ref 0–0.8)
EOSINOPHIL NFR BLD: 2 % (ref 0.5–7.8)
ERYTHROCYTE [DISTWIDTH] IN BLOOD BY AUTOMATED COUNT: 14.1 % (ref 11.9–14.6)
GLOBULIN SER CALC-MCNC: 4.2 G/DL (ref 2.3–3.5)
GLUCOSE SERPL-MCNC: 91 MG/DL (ref 65–100)
HCT VFR BLD AUTO: 32.1 % (ref 35.8–46.3)
HGB BLD-MCNC: 10.6 G/DL (ref 11.7–15.4)
LYMPHOCYTES # BLD AUTO: 37 % (ref 13–44)
LYMPHOCYTES # BLD: 1.6 K/UL (ref 0.5–4.6)
MCH RBC QN AUTO: 28.8 PG (ref 26.1–32.9)
MCHC RBC AUTO-ENTMCNC: 33 G/DL (ref 31.4–35)
MCV RBC AUTO: 87.2 FL (ref 79.6–97.8)
MONOCYTES # BLD: 0.5 K/UL (ref 0.1–1.3)
MONOCYTES NFR BLD AUTO: 12 % (ref 4–12)
NEUTS SEG # BLD: 2 K/UL (ref 1.7–8.2)
NEUTS SEG NFR BLD AUTO: 49 % (ref 43–78)
NRBC # BLD: 0 K/UL (ref 0–0.2)
PLATELET # BLD AUTO: 270 K/UL (ref 150–450)
PMV BLD AUTO: 8.8 FL (ref 10.8–14.1)
POTASSIUM SERPL-SCNC: 3.9 MMOL/L (ref 3.5–5.1)
PROT SERPL-MCNC: 8.2 G/DL (ref 6.3–8.2)
RBC # BLD AUTO: 3.68 M/UL (ref 4.05–5.25)
SODIUM SERPL-SCNC: 140 MMOL/L (ref 136–145)
WBC # BLD AUTO: 4.2 K/UL (ref 4.3–11.1)

## 2017-06-13 PROCEDURE — 74011250636 HC RX REV CODE- 250/636: Performed by: INTERNAL MEDICINE

## 2017-06-13 PROCEDURE — 74011250637 HC RX REV CODE- 250/637: Performed by: INTERNAL MEDICINE

## 2017-06-13 PROCEDURE — 80053 COMPREHEN METABOLIC PANEL: CPT | Performed by: INTERNAL MEDICINE

## 2017-06-13 PROCEDURE — 96375 TX/PRO/DX INJ NEW DRUG ADDON: CPT

## 2017-06-13 PROCEDURE — 96413 CHEMO IV INFUSION 1 HR: CPT

## 2017-06-13 PROCEDURE — 85025 COMPLETE CBC W/AUTO DIFF WBC: CPT | Performed by: INTERNAL MEDICINE

## 2017-06-13 RX ORDER — DIPHENHYDRAMINE HYDROCHLORIDE 50 MG/ML
25 INJECTION, SOLUTION INTRAMUSCULAR; INTRAVENOUS ONCE
Status: COMPLETED | OUTPATIENT
Start: 2017-06-13 | End: 2017-06-13

## 2017-06-13 RX ORDER — SODIUM CHLORIDE 0.9 % (FLUSH) 0.9 %
10 SYRINGE (ML) INJECTION AS NEEDED
Status: ACTIVE | OUTPATIENT
Start: 2017-06-13 | End: 2017-06-14

## 2017-06-13 RX ORDER — HEPARIN 100 UNIT/ML
300-500 SYRINGE INTRAVENOUS AS NEEDED
Status: DISPENSED | OUTPATIENT
Start: 2017-06-13 | End: 2017-06-14

## 2017-06-13 RX ORDER — ACETAMINOPHEN 325 MG/1
650 TABLET ORAL ONCE
Status: COMPLETED | OUTPATIENT
Start: 2017-06-13 | End: 2017-06-13

## 2017-06-13 RX ADMIN — ACETAMINOPHEN 650 MG: 325 TABLET, FILM COATED ORAL at 14:35

## 2017-06-13 RX ADMIN — Medication 10 ML: at 14:27

## 2017-06-13 RX ADMIN — DIPHENHYDRAMINE HYDROCHLORIDE 25 MG: 50 INJECTION, SOLUTION INTRAMUSCULAR; INTRAVENOUS at 14:35

## 2017-06-13 RX ADMIN — Medication 498 MG: at 15:02

## 2017-06-13 RX ADMIN — SODIUM CHLORIDE, PRESERVATIVE FREE 500 UNITS: 5 INJECTION INTRAVENOUS at 15:45

## 2017-06-13 RX ADMIN — SODIUM CHLORIDE 500 ML: 900 INJECTION, SOLUTION INTRAVENOUS at 14:27

## 2017-06-13 RX ADMIN — Medication 10 ML: at 15:45

## 2017-06-13 NOTE — PROGRESS NOTES
6/13/17 saw pt today with Dr. Jose Rodriguez for follow up breast cancer, here for herceptin. She is tolerating arimidex well, some mild hot flashes. PO intake is good. Continue herceptin. Echo prior to next infusion. Follow up in 6 weeks. Encouraged to call with any concerns. Navigation will continue to follow.

## 2017-06-13 NOTE — PROGRESS NOTES
Arrived to the [facility]. Chemo completed. Patient tolerated well. Port flushed and de-accessed. Any issues or concerns during appointment: None. Patient aware of next infusion appointment on 7/4 (date) at 56 (time). Discharged ambulatory in stable condition.

## 2017-06-28 ENCOUNTER — HOSPITAL ENCOUNTER (OUTPATIENT)
Dept: NON INVASIVE DIAGNOSTICS | Age: 62
Discharge: HOME | End: 2017-06-28
Attending: INTERNAL MEDICINE
Payer: OTHER GOVERNMENT

## 2017-06-28 DIAGNOSIS — Z51.81 ENCOUNTER FOR MONITORING CARDIOTOXIC DRUG THERAPY: ICD-10-CM

## 2017-06-28 DIAGNOSIS — Z79.899 ENCOUNTER FOR MONITORING CARDIOTOXIC DRUG THERAPY: ICD-10-CM

## 2017-06-28 PROCEDURE — 93306 TTE W/DOPPLER COMPLETE: CPT

## 2017-07-04 ENCOUNTER — HOSPITAL ENCOUNTER (OUTPATIENT)
Dept: INFUSION THERAPY | Age: 62
Discharge: HOME | End: 2017-07-04
Payer: OTHER GOVERNMENT

## 2017-07-04 VITALS
DIASTOLIC BLOOD PRESSURE: 77 MMHG | HEART RATE: 79 BPM | BODY MASS INDEX: 33.69 KG/M2 | WEIGHT: 190.2 LBS | RESPIRATION RATE: 18 BRPM | SYSTOLIC BLOOD PRESSURE: 139 MMHG | TEMPERATURE: 98.1 F

## 2017-07-04 DIAGNOSIS — C50.911 INFILTRATING DUCTAL CARCINOMA OF RIGHT BREAST (HCC): ICD-10-CM

## 2017-07-04 LAB
ANION GAP BLD CALC-SCNC: 7 MMOL/L (ref 7–16)
BASOPHILS # BLD AUTO: 0 K/UL (ref 0–0.2)
BASOPHILS # BLD: 0 % (ref 0–2)
BUN SERPL-MCNC: 17 MG/DL (ref 8–23)
CALCIUM SERPL-MCNC: 9.8 MG/DL (ref 8.3–10.4)
CHLORIDE SERPL-SCNC: 106 MMOL/L (ref 98–107)
CO2 SERPL-SCNC: 29 MMOL/L (ref 21–32)
CREAT SERPL-MCNC: 0.93 MG/DL (ref 0.6–1)
DIFFERENTIAL METHOD BLD: ABNORMAL
EOSINOPHIL # BLD: 0.1 K/UL (ref 0–0.8)
EOSINOPHIL NFR BLD: 2 % (ref 0.5–7.8)
ERYTHROCYTE [DISTWIDTH] IN BLOOD BY AUTOMATED COUNT: 14.5 % (ref 11.9–14.6)
GLUCOSE SERPL-MCNC: 109 MG/DL (ref 65–100)
HCT VFR BLD AUTO: 31.4 % (ref 35.8–46.3)
HGB BLD-MCNC: 10.3 G/DL (ref 11.7–15.4)
IMM GRANULOCYTES # BLD: 0 K/UL (ref 0–0.5)
IMM GRANULOCYTES NFR BLD AUTO: 0.6 % (ref 0–5)
LYMPHOCYTES # BLD AUTO: 31 % (ref 13–44)
LYMPHOCYTES # BLD: 1.5 K/UL (ref 0.5–4.6)
MCH RBC QN AUTO: 28.4 PG (ref 26.1–32.9)
MCHC RBC AUTO-ENTMCNC: 32.8 G/DL (ref 31.4–35)
MCV RBC AUTO: 86.5 FL (ref 79.6–97.8)
MONOCYTES # BLD: 0.5 K/UL (ref 0.1–1.3)
MONOCYTES NFR BLD AUTO: 9 % (ref 4–12)
NEUTS SEG # BLD: 2.8 K/UL (ref 1.7–8.2)
NEUTS SEG NFR BLD AUTO: 57 % (ref 43–78)
PLATELET # BLD AUTO: 336 K/UL (ref 150–450)
PMV BLD AUTO: 8.8 FL (ref 10.8–14.1)
POTASSIUM SERPL-SCNC: 3.9 MMOL/L (ref 3.5–5.1)
RBC # BLD AUTO: 3.63 M/UL (ref 4.05–5.25)
SODIUM SERPL-SCNC: 142 MMOL/L (ref 136–145)
WBC # BLD AUTO: 5 K/UL (ref 4.3–11.1)

## 2017-07-04 PROCEDURE — 96375 TX/PRO/DX INJ NEW DRUG ADDON: CPT

## 2017-07-04 PROCEDURE — 74011250637 HC RX REV CODE- 250/637: Performed by: INTERNAL MEDICINE

## 2017-07-04 PROCEDURE — 80048 BASIC METABOLIC PNL TOTAL CA: CPT | Performed by: INTERNAL MEDICINE

## 2017-07-04 PROCEDURE — 74011250636 HC RX REV CODE- 250/636: Performed by: INTERNAL MEDICINE

## 2017-07-04 PROCEDURE — 96413 CHEMO IV INFUSION 1 HR: CPT

## 2017-07-04 PROCEDURE — 85025 COMPLETE CBC W/AUTO DIFF WBC: CPT | Performed by: INTERNAL MEDICINE

## 2017-07-04 PROCEDURE — 77030003560 HC NDL HUBR BARD -A

## 2017-07-04 RX ORDER — ACETAMINOPHEN 325 MG/1
650 TABLET ORAL ONCE
Status: COMPLETED | OUTPATIENT
Start: 2017-07-04 | End: 2017-07-04

## 2017-07-04 RX ORDER — SODIUM CHLORIDE 0.9 % (FLUSH) 0.9 %
10 SYRINGE (ML) INJECTION AS NEEDED
Status: ACTIVE | OUTPATIENT
Start: 2017-07-04 | End: 2017-07-04

## 2017-07-04 RX ORDER — DIPHENHYDRAMINE HYDROCHLORIDE 50 MG/ML
25 INJECTION, SOLUTION INTRAMUSCULAR; INTRAVENOUS ONCE
Status: COMPLETED | OUTPATIENT
Start: 2017-07-04 | End: 2017-07-04

## 2017-07-04 RX ORDER — HEPARIN 100 UNIT/ML
300-500 SYRINGE INTRAVENOUS AS NEEDED
Status: DISPENSED | OUTPATIENT
Start: 2017-07-04 | End: 2017-07-04

## 2017-07-04 RX ADMIN — Medication 10 ML: at 11:12

## 2017-07-04 RX ADMIN — Medication 500 UNITS: at 12:00

## 2017-07-04 RX ADMIN — SODIUM CHLORIDE 500 ML: 900 INJECTION, SOLUTION INTRAVENOUS at 11:10

## 2017-07-04 RX ADMIN — Medication 10 ML: at 12:00

## 2017-07-04 RX ADMIN — DIPHENHYDRAMINE HYDROCHLORIDE 25 MG: 50 INJECTION, SOLUTION INTRAMUSCULAR; INTRAVENOUS at 11:08

## 2017-07-04 RX ADMIN — ACETAMINOPHEN 650 MG: 325 TABLET ORAL at 11:05

## 2017-07-04 RX ADMIN — TRASTUZUMAB 498 MG: 150 INJECTION, POWDER, LYOPHILIZED, FOR SOLUTION INTRAVENOUS at 11:30

## 2017-07-06 ENCOUNTER — HOSPITAL ENCOUNTER (OUTPATIENT)
Dept: RADIATION ONCOLOGY | Age: 62
Discharge: HOME | End: 2017-07-06
Payer: OTHER GOVERNMENT

## 2017-07-06 VITALS
WEIGHT: 191.7 LBS | BODY MASS INDEX: 33.96 KG/M2 | SYSTOLIC BLOOD PRESSURE: 159 MMHG | TEMPERATURE: 98.7 F | DIASTOLIC BLOOD PRESSURE: 89 MMHG | HEART RATE: 80 BPM | OXYGEN SATURATION: 97 %

## 2017-07-06 DIAGNOSIS — R92.8 ABNORMAL MAMMOGRAM OF RIGHT BREAST: ICD-10-CM

## 2017-07-06 DIAGNOSIS — C50.911 MALIGNANT NEOPLASM OF RIGHT FEMALE BREAST, UNSPECIFIED SITE OF BREAST: Primary | ICD-10-CM

## 2017-07-06 PROCEDURE — 99211 OFF/OP EST MAY X REQ PHY/QHP: CPT

## 2017-07-06 NOTE — PROGRESS NOTES
Patient: Cristiana Kaminski MRN: [id number]  SSN: [id number]    YOB: 1955  Age: 62 y.o. Sex: female      Other Providers:  Rob Crystal MD    CHIEF COMPLAINT: Breast cancer    DIAGNOSIS: Right sided [id number] Stage IA invasive ductal carcinoma, two foci of disease, largest 0.7 cm.  92% ER, 7% AK, HER2 3+ positive    PREVIOUS TREATMENT:  1) 8/26/16:  Right breast lumpectomy   2) 9/23/16: Re-resection and SLN biopsy   3) Weekly paclitaxel + trastuzumab for 12 weeks, followed by Herceptin monotherapy to complete a year  4) Radiation of the right breast with 25 fractions of 5000 cGy, 5 boost of 1000 cGy. Treatment dates: 02/-1/17 through 03/14/17  5) AI, Arimidex: March 2017    HISTORY OF PRESENT ILLNESS:  Cristiana Kaminski is a 62 y.o. female seen by Dr. Shilo Torres 01/12/17 at the request of Rob Crystal MD.  She had a screening mammogram in June 2016 that showed a developing group of calcifications in the upper outer right breast. She completed biopsy which showed DCIS. MRI was also completed showing a 9 mm enhancing mass. She was referred for surgical management and underwent lumpectomy on 8/26/16. Pathology review surprisingly showed two foci of invasive ductal carcinoma in a background of DCIS. The larger of the two foci was 0.7 cm, and receptors showed 92% ER and 7% AK, but also showed HER2 3+ positive. She went back for sentinel node biopsy and reexcision, both of which were negative for residual carcinoma. She was referred to medical oncology for adjuvant recommendations and saw Dr. Balaji Rivera who felt she would benefit from APT regimen with weekly paclitaxel + trastuzumab for 12 weeks, followed by Herceptin monotherapy to complete a year. She will also be receiving endocrine therapy with AI. She was seen by Dr. Balaji Rivera prior to her final chemotherapy and noted nausea and fatigue from chemo but was manageable. No fevers, chills or other infectious symptoms.  Neuropathy grade 1, stable and not interfering with ADLs. Pain in the tips of her nails, but not interfering with ADLs. With the completion of chemo, she was referred for adjuvant radiation recommendations. INTERVAL HISTORY:  She returns today for follow up, 4 months after completing radiation therapy for her right breast invasive ductal carcinoma. She tolerated radiation well. She continues with occasional \"pulling\" under the right axilla and tanning of the skin, otherwise doing well. She continues on monotherapy Herceptin under the direction of Dr. Rishi Ambrose. She continues on Arimidex and is tolerating well. Her energy continues to improve. She denies cough or dyspnea. OBSTETRIC HISTORY:    Menarche at the age of 14.    G3,P1. 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      PAST SURGICAL HISTORY:   Past Surgical History:   Procedure Laterality Date    HX BREAST BIOPSY Right 8/26/2016    RIGHT BREAST NEEDLE LOCALIZED BIOPSY performed by Colt Blackwell MD     HX BREAST LUMPECTOMY Right 8/26/2016    RIGHT BREAST LUMPECTOMY performed by Colt Blackwell MD     HX BREAST LUMPECTOMY Right 9/23/2016    RIGHT BREAST LUMPECTOMY/PARTIAL REVISION  performed by Colt Blackwell    HX COLONOSCOPY  2007    diverticulosis    HX ORTHOPAEDIC      left wrist ORIF  HX TUBAL LIGATION      HX VASCULAR ACCESS      NELA BIOPSY BREAST STEREOTACTIC Right 7.19.16       MEDICATIONS:     Current Outpatient Prescriptions:     fluticasone (FLONASE) 50 mcg/actuation nasal spray, 2 Sprays by Both Nostrils route two (2) times daily as needed for Rhinitis for up to 30 days. Indications: ALLERGIC RHINITIS, Disp: 1 Bottle, Rfl: 1    sertraline (ZOLOFT) 100 mg tablet, TAKE 1 TABLET DAILY, Disp: 90 Tab, Rfl: 1    lisinopril (PRINIVIL, ZESTRIL) 40 mg tablet, Take 1 Tab by mouth daily. , Disp: 90 Tab, Rfl: 1    hydroCHLOROthiazide (HYDRODIURIL) 50 mg tablet, Take 1 Tab by mouth daily. Indications: hypertension, Disp: 90 Tab, Rfl: 1    atorvastatin (LIPITOR) 40 mg tablet, Take 1 Tab by mouth nightly., Disp: 90 Tab, Rfl: 1    amLODIPine (NORVASC) 10 mg tablet, Take 1 Tab by mouth daily. , Disp: 90 Tab, Rfl: 1    calcium carbonate (CALTREX) 600 mg calcium (1,500 mg) tablet, Take 600 mg by mouth two (2) times a day., Disp: , Rfl:     biotin 10,000 mcg cap, Take 1 Cap by mouth daily. , Disp: , Rfl:     TRASTUZUMAB (HERCEPTIN IV), by IntraVENous route every twenty-one (21) days. , Disp: , Rfl:     anastrozole (ARIMIDEX) 1 mg tablet, Take 1 Tab by mouth daily. , Disp: 30 Tab, Rfl: 5    lidocaine-prilocaine (EMLA) topical cream, Apply  to affected area as needed for Pain. Apply to port site 45-60 minutes prior to lab appt or infusion. , Disp: 30 g, Rfl: 1    LOPERAMIDE HCL (IMODIUM PO), Take  by mouth as needed. , Disp: , Rfl:     DIPHENHYDRAMINE HCL (BENADRYL PO), Take  by mouth as needed. , Disp: , Rfl:     ondansetron hcl (ZOFRAN, AS HYDROCHLORIDE,) 8 mg tablet, Take 1 Tab by mouth every eight (8) hours as needed for Nausea., Disp: 90 Tab, Rfl: 2    LORazepam (ATIVAN) 0.5 mg tablet, Take 1 Tab by mouth daily as needed for Anxiety.  (Patient taking differently: Take 0.5 mg by mouth as needed for Anxiety.), Disp: 30 Tab, Rfl: 1    aspirin (JESSICA CHEWABLE ASPIRIN) 81 mg chewable tablet, Take 81 mg by mouth daily. , Disp: , Rfl:     ALLERGIES:   Allergies   Allergen Reactions    Pcn [Penicillins] Hives       SOCIAL HISTORY:   Social History     Social History    Marital status:      Spouse name: N/A    Number of children: N/A    Years of education: N/A     Occupational History    Not on file. Social History Main Topics    Smoking status: Never Smoker    Smokeless tobacco: Never Used    Alcohol use No    Drug use: No    Sexual activity: Not on file     Other Topics Concern    Not on file     Social History Narrative    Lives at home with , Pattie Castro, and her mother. Homemaker. FAMILY HISTORY:   Family History   Problem Relation Age of Onset    Diabetes Mother    [address] [facility] Alzheimer Mother     No Known Problems Father     Breast Cancer Maternal Aunt        REVIEW OF SYSTEMS: Please see the completed review of systems sheet in the chart that I have reviewed today. PHYSICAL EXAMINATION:   ECOG Performance status 0  VITAL SIGNS:   There were no vitals taken for this visit. GENERAL: The patient is well-developed, ambulatory, alert and in no acute distress. HEENT: Head is normocephalic, atraumatic. CARDIOVASCULAR: Heart is regular rate and rhythm. RESPIRATORY: Lungs are clear to auscultation. There is normal respiratory effort. LYMPHATIC: There is no cervical, supraclavicular or axillary lymphadenopathy bilaterally. BREASTS: Examination of the unaffected breast reveals no dominant nodules or masses. The lumpectomy cavity appears well healed with good aesthetics and symmetry. Well healed surgical incision. Tanning of the skin. PATHOLOGY:    8/26/16:     DIAGNOSIS   RIGHT BREAST LOCALIZED WIDE EXCISION: TWO FOCI OF INFILTRATING DUCT CARCINOMA, LOW GRADE (WELL DIFFERENTIATED) IN A BACKGROUND OF MULTIFOCAL DUCTAL CARCINOMA IN-SITU, HIGH GRADE (CRIBRIFORM AND SOLID TYPE).  ONE FOCUS OF INFILTRATING CARCINOMA IS APPROXIMATELY 0.7 X 0.4 CM ON SLIDE AND IS APPROXIMATELY 0.5 CM FROM MARKED ANTERIOR MARGIN. OTHER MARGINS ARE GREATER THAN 1 CM FROM THIS INFILTRATING TUMOR. SECOND FOCUS OF INFILTRATING CARCINOMA IS APPROXIMATELY 0.1 X 0.1 CM AND IS APPROXIMATELY 0.2 CM FROM MARKED POSTERIOR MARGIN. DUCTAL CARCINOMA IN-SITU IS PRESENT LESS THAN 0.1 CM FROM MARKED ANTERIOR MARGIN AND APPROXIMATELY 0.1 CM FROM MARKED POSTERIOR AND MEDIAL MARGINS. OTHER MARGINS ARE GREATER THAN 1 CM FROM TUMOR (SEE [id number]). Interpretation   Clarient Diagnostic Result:   TEST NAME: RESULTS: INTERNAL CONTROLS:   ESTROGEN RECEPTOR: Positive (92%) Present and ER positive   PROGESTERONE RECEPTOR: Positive (7%) Present and NV positive   HER-2/JAGDISH: Positive (3+) Percent of cells with 3+ stain: 38%      ANATOMIC SITE: Right breast (presumably 10 o'clock position based on prior core biopsy). PROCEDURE: Needle localized wide excision. HISTOLOGIC TYPE: Infiltrating duct carcinoma. SIZE: Two foci, one approximately 0.7 x 0.4 cm and an apparent separate focus approximately 0.1 x 0.1 cm. UNIFOCAL OR MULTIFOCAL: Apparently multifocal.   PRESENCE OF SKIN, NIPPLE OR SKELETAL MUSCLE INVOLVEMENT: Not identified. ZARA MODIFICATION OF BLOOM-KAPADIA GRADE:   ARCHITECTURAL SCORE: 2/3. NUCLEAR SCORE: 2/3. MITOTIC SCORE: 1/3. TOTAL SCORE: 5/9 = Low Grade for both tumors. IN-SITU COMPONENT: Multifocal ductal carcinoma in-situ is noted, high grade (cribriform and solid type). LYMPHOVASCULAR INVASION: Not identified. ARE MICROCALCIFICATIONS IDENTIFIED: Within ductal carcinoma in-situ. TUMOR DISTANCE TO CLOSEST MARGIN: Ductal carcinoma in-situ is present less than 0.1 cm from marked anterior margin and approximately 0.1 cm from marked medial margin. Small focus of infiltrating carcinoma is present approximately 0.2 cm from marked deep margin.    ANTERIOR (SUPERFICIAL): Ductal carcinoma in-situ is present less than 0.1 cm from marked anterior margin and larger focus of infiltrating carcinoma is present approximately 0.5 cm from marked anterior margin. POSTERIOR (DEEP): Focus of ductal carcinoma in-situ is present approximately 0.1 cm from marked posterior margin and small focus of infiltrating carcinoma is present approximately 0.2 cm from marked posterior margin. MEDIAL: Focus of ductal carcinoma in-situ is present approximately 0.1 cm from marked medial margin. LATERAL: Greater than 1 cm. INFERIOR: Greater than 1 cm. SUPERIOR: Greater than 1 cm. LYMPH NODE STATUS: Does not apply. TOTAL NUMBER OF LYMPH NODES CONTAINING CARCINOMA: Does not apply. TOTAL NUMBER OF LYMPH NODES EXAMINED: Does not apply. METASTASIS: Does not apply. SIZE OF LARGEST POSITIVE NODE: Does not apply. EXTRA-CAPSULAR EXTENSION OF TUMOR: Does not apply. OTHER FINDINGS: Changes consistent with prior biopsy site. TNM CLASSIFICATION: pT1 pNX.     9/23/16:   DIAGNOSIS   A: POST LUMPECTOMY MARGIN RIGHT BREAST:   BENIGN BREAST TISSUE WITH FIBROCYSTIC MASTOPATHY AND FAT NECROSIS. NO RESIDUAL CARCINOMA IS IDENTIFIED. B: SENTINEL NODE RIGHT BREAST:   ONE LYMPH NODE NEGATIVE FOR METASTATIC     LABORATORY: none today    RADIOLOGY:    I have personally reviewed the imaging and agree with the reports below. 6/24/16:STUDY: Bilateral digital screening mammogram      INDICATION: Screening     COMPARISON: 12/21/2012     FINDINGS: Bilateral digital mammography was performed, and is interpreted in  conjunction with a computer assisted detection (CAD) system.      The breast parenchymal density is normal. There is developing calcification in  the upper outer right breast. No significant masses are identified. There is  no skin thickening or nipple retraction. There is no architectural distortion.     IMPRESSION: Developing calcification in the upper outer right breast.   Magnification views recommended.     BI-RADS Assessment Category 0:  Incomplete: Needs additional imaging evaluation.     We will attempt to contact the patient and arrange this additional imaging.     BD2    MRI BILATERAL BREASTS WITHOUT AND WITH CONTRAST, 7/29/2016.      CLINICAL HISTORY: 78-year-old with new diagnosis of right DCIS. Patient without  breast complaints.     Technique: Multiplanar multisequence imaging of the breast were performed prior  to and following the uneventful intravenous administration of 17 mL of  Magnevist. Postcontrast imaging was performed using a dynamic technique. Images  were reviewed using the [employer].      Sequences obtained: Sagittal T2, axial STIR, axial T1, and axial fat-saturated  T1-weighted images prior to and following administration of contrast.      Comparison studies: Bilateral mammogram 6/24/2016.      FINDINGS:   A skin marker has been placed at the 10:00 position of the left breast located  10 cm from the nipple marking the patient's biopsy site. The breasts are  composed of heterogeneous fibroglandular elements. No enlarged axillary lymph  nodes are seen. No enlarged internal mammary lymph nodes are seen. Evaluation of  the lungs is limited by MRI imaging. However, no obvious pulmonary masses are  seen. No significant pleural effusions are seen. The liver is obscured by  cardiac motion artifact and only partially visualized. A T2 hyperintense lesion  is suggested in the superior right lobe on axial STIR image 9 which likely  represents a small cyst.       Following the administration of intravenous contrast, normal enhancement is seen  of the heart and vascular structures of the breasts. Moderate background  physiologic enhancement of the breasts is seen. An enhancing biopsy tract is  seen extending from the lateral towards the central breast. The biopsy cavity is  felt to be best appreciated on axial postcontrast subtraction image 261, and  sagittal postcontrast reconstructed image 47.  Just anterior and inferior to the  biopsy cavity, there is a 6 mm x 8 mm x 9 mm area of masslike enhancement best  appreciated on axial postcontrast subtraction image 259, and sagittal  reconstructed postcontrast image 48 which may represent a small area of invasive  disease. This is felt to correspond to an adjacent nodular density as seen on  the prior diagnostic mammogram. Additional nodular enhancement is seen extending  anteriorly to the biopsy cavity by approximately 3.2 cm best appreciated on  sagittal reconstructed postcontrast image 46 for which additional noninvasive  disease cannot be excluded. These do appear to correspond to an additional faint  calcifications anterior to the biopsied cluster seen on the prior diagnostic  mammogram. Stereotactic biopsy of the additional calcifications can be  considered if this would change clinical management. No evidence of skin  thickening, or nipple retraction is seen.      IMPRESSION:  1. Post biopsy changes in the outer right breast. A small 9 mm enhancing mass,  and additional nonmasslike enhancement are seen anterior to the biopsy cavity as  described above suggesting potential multifocal disease. No evidence for  multicentric disease, or metastatic disease is otherwise seen.     BI-RADS Assessment Category 6: Known Biopsy Proven Malignancy    IMPRESSION:  Yani Mejia is a 62 y.o. female with right sided [id number] Stage IA invasive ductal carcinoma, two foci of disease, largest 0.7 cm, 92% ER, 7% TN, HER2 3+ positive S/P right breast lumpectomy, 08/26/16 F/B Re-resection and SLN biopsy, 09/23/16. She completed weekly paclitaxel + trastuzumab for 12 weeks, and now on monotherapy of Herceptin to complete a year (10/17). She completed radiation therapy 03/14/17. She is recovering as anticipated from radiation therapy. She began following radiation and is tolerating well. Mammogram, 05/08/17 shows post radiation and surgical changes, recommending short follow up mammogram. Dexa scan completed 04/6/17 shows osteopenia.      PLAN:   1) repeat right breast mammogram 6 months   2) continue lotions to radiation field  3) continue SBE  4) continue calcium and vitamin D - repeat dexa 04/19  5) AI under the direction of medical oncology, Dr. Keyla Boles  6) Follow up 6 months      Bianca Brennan NP   July 6, 2017       Portions of this note were copied from prior encounters and reviewed for accuracy, currency, and represent documentation and tasks completed during this encounter. I verify and attest these portions to be unchanged from prior visits.

## 2017-07-06 NOTE — PROGRESS NOTES
Pt here for f/u for right breast cancer. S/p lumpectomy x2, chemo and RT. On Herceptin monotherapy. Mammogram 5-8-17-repeat in 6 months per pt. Bone density 4-6-17-osteopenia. Taking caltrate. Taking Arimidex. F/u with Dr. Can Patton on 7-25-17.     Caleb Brice RN

## 2017-07-25 ENCOUNTER — HOSPITAL ENCOUNTER (OUTPATIENT)
Dept: LAB | Age: 62
Discharge: HOME | End: 2017-07-25
Payer: OTHER GOVERNMENT

## 2017-07-25 ENCOUNTER — PATIENT OUTREACH (OUTPATIENT)
Dept: CASE MANAGEMENT | Age: 62
End: 2017-07-25

## 2017-07-25 ENCOUNTER — HOSPITAL ENCOUNTER (OUTPATIENT)
Dept: INFUSION THERAPY | Age: 62
Discharge: HOME | End: 2017-07-25
Payer: OTHER GOVERNMENT

## 2017-07-25 VITALS — DIASTOLIC BLOOD PRESSURE: 94 MMHG | HEART RATE: 66 BPM | SYSTOLIC BLOOD PRESSURE: 149 MMHG

## 2017-07-25 DIAGNOSIS — C50.911 INFILTRATING DUCTAL CARCINOMA OF RIGHT BREAST (HCC): ICD-10-CM

## 2017-07-25 LAB
ALBUMIN SERPL BCP-MCNC: 3.9 G/DL (ref 3.2–4.6)
ALBUMIN/GLOB SERPL: 1 {RATIO} (ref 1.2–3.5)
ALP SERPL-CCNC: 82 U/L (ref 50–136)
ALT SERPL-CCNC: 20 U/L (ref 12–65)
ANION GAP BLD CALC-SCNC: 5 MMOL/L (ref 7–16)
AST SERPL W P-5'-P-CCNC: 19 U/L (ref 15–37)
BASOPHILS # BLD AUTO: 0 K/UL (ref 0–0.2)
BASOPHILS # BLD: 1 % (ref 0–2)
BILIRUB SERPL-MCNC: 0.4 MG/DL (ref 0.2–1.1)
BUN SERPL-MCNC: 14 MG/DL (ref 8–23)
CALCIUM SERPL-MCNC: 9.8 MG/DL (ref 8.3–10.4)
CHLORIDE SERPL-SCNC: 107 MMOL/L (ref 98–107)
CO2 SERPL-SCNC: 29 MMOL/L (ref 21–32)
CREAT SERPL-MCNC: 0.75 MG/DL (ref 0.6–1)
DIFFERENTIAL METHOD BLD: ABNORMAL
EOSINOPHIL # BLD: 0.1 K/UL (ref 0–0.8)
EOSINOPHIL NFR BLD: 3 % (ref 0.5–7.8)
ERYTHROCYTE [DISTWIDTH] IN BLOOD BY AUTOMATED COUNT: 14 % (ref 11.9–14.6)
GLOBULIN SER CALC-MCNC: 4.1 G/DL (ref 2.3–3.5)
GLUCOSE SERPL-MCNC: 93 MG/DL (ref 65–100)
HCT VFR BLD AUTO: 28.5 % (ref 35.8–46.3)
HGB BLD-MCNC: 9.5 G/DL (ref 11.7–15.4)
LYMPHOCYTES # BLD AUTO: 34 % (ref 13–44)
LYMPHOCYTES # BLD: 1.5 K/UL (ref 0.5–4.6)
MCH RBC QN AUTO: 29.3 PG (ref 26.1–32.9)
MCHC RBC AUTO-ENTMCNC: 33.3 G/DL (ref 31.4–35)
MCV RBC AUTO: 88 FL (ref 79.6–97.8)
MONOCYTES # BLD: 0.4 K/UL (ref 0.1–1.3)
MONOCYTES NFR BLD AUTO: 10 % (ref 4–12)
NEUTS SEG # BLD: 2.3 K/UL (ref 1.7–8.2)
NEUTS SEG NFR BLD AUTO: 53 % (ref 43–78)
NRBC # BLD: 0 K/UL (ref 0–0.2)
PLATELET # BLD AUTO: 267 K/UL (ref 150–450)
PMV BLD AUTO: 9.2 FL (ref 10.8–14.1)
POTASSIUM SERPL-SCNC: 3.7 MMOL/L (ref 3.5–5.1)
PROT SERPL-MCNC: 8 G/DL (ref 6.3–8.2)
RBC # BLD AUTO: 3.24 M/UL (ref 4.05–5.25)
SODIUM SERPL-SCNC: 141 MMOL/L (ref 136–145)
WBC # BLD AUTO: 4.3 K/UL (ref 4.3–11.1)

## 2017-07-25 PROCEDURE — 80053 COMPREHEN METABOLIC PANEL: CPT | Performed by: INTERNAL MEDICINE

## 2017-07-25 PROCEDURE — 74011250637 HC RX REV CODE- 250/637: Performed by: NURSE PRACTITIONER

## 2017-07-25 PROCEDURE — 74011250636 HC RX REV CODE- 250/636

## 2017-07-25 PROCEDURE — 96375 TX/PRO/DX INJ NEW DRUG ADDON: CPT

## 2017-07-25 PROCEDURE — 96361 HYDRATE IV INFUSION ADD-ON: CPT

## 2017-07-25 PROCEDURE — 96413 CHEMO IV INFUSION 1 HR: CPT

## 2017-07-25 PROCEDURE — 74011250636 HC RX REV CODE- 250/636: Performed by: NURSE PRACTITIONER

## 2017-07-25 PROCEDURE — 85025 COMPLETE CBC W/AUTO DIFF WBC: CPT | Performed by: INTERNAL MEDICINE

## 2017-07-25 RX ORDER — ACETAMINOPHEN 325 MG/1
650 TABLET ORAL ONCE
Status: COMPLETED | OUTPATIENT
Start: 2017-07-25 | End: 2017-07-25

## 2017-07-25 RX ORDER — HEPARIN 100 UNIT/ML
300-500 SYRINGE INTRAVENOUS AS NEEDED
Status: DISPENSED | OUTPATIENT
Start: 2017-07-25 | End: 2017-07-26

## 2017-07-25 RX ORDER — DIPHENHYDRAMINE HYDROCHLORIDE 50 MG/ML
25 INJECTION, SOLUTION INTRAMUSCULAR; INTRAVENOUS ONCE
Status: COMPLETED | OUTPATIENT
Start: 2017-07-25 | End: 2017-07-25

## 2017-07-25 RX ORDER — SODIUM CHLORIDE 0.9 % (FLUSH) 0.9 %
10 SYRINGE (ML) INJECTION AS NEEDED
Status: ACTIVE | OUTPATIENT
Start: 2017-07-25 | End: 2017-07-26

## 2017-07-25 RX ADMIN — SODIUM CHLORIDE, PRESERVATIVE FREE 500 UNITS: 5 INJECTION INTRAVENOUS at 16:40

## 2017-07-25 RX ADMIN — Medication 10 ML: at 16:40

## 2017-07-25 RX ADMIN — TRASTUZUMAB 498 MG: 150 INJECTION, POWDER, LYOPHILIZED, FOR SOLUTION INTRAVENOUS at 16:01

## 2017-07-25 RX ADMIN — SODIUM CHLORIDE 500 ML: 900 INJECTION, SOLUTION INTRAVENOUS at 15:30

## 2017-07-25 RX ADMIN — DIPHENHYDRAMINE HYDROCHLORIDE 25 MG: 50 INJECTION, SOLUTION INTRAMUSCULAR; INTRAVENOUS at 15:28

## 2017-07-25 RX ADMIN — ACETAMINOPHEN 650 MG: 325 TABLET, FILM COATED ORAL at 15:29

## 2017-07-25 RX ADMIN — Medication 10 ML: at 15:25

## 2017-07-25 NOTE — PROGRESS NOTES
Arrived to the [facility]. Herceptin completed. Patient tolerated well. Any issues or concerns during appointment: None. Patient aware of next infusion appointment on August 15th at 1315. Discharged ambulatory.

## 2017-07-25 NOTE — PROGRESS NOTES
7/25/17 saw pt today with Yolanda Bergeron NP for follow up breast cancer. Here for herceptin. She is feeling well. PO intake is good. Echo prior to last herceptin. Follow up in 6 weeks. Encouraged to call with any concerns. Navigation will continue to follow.

## 2017-08-15 ENCOUNTER — HOSPITAL ENCOUNTER (OUTPATIENT)
Dept: INFUSION THERAPY | Age: 62
Discharge: HOME | End: 2017-08-15
Payer: OTHER GOVERNMENT

## 2017-08-15 VITALS
BODY MASS INDEX: 33.48 KG/M2 | TEMPERATURE: 98 F | DIASTOLIC BLOOD PRESSURE: 89 MMHG | HEART RATE: 75 BPM | SYSTOLIC BLOOD PRESSURE: 153 MMHG | WEIGHT: 189 LBS | RESPIRATION RATE: 16 BRPM | OXYGEN SATURATION: 95 %

## 2017-08-15 DIAGNOSIS — C50.911 INFILTRATING DUCTAL CARCINOMA OF RIGHT BREAST (HCC): ICD-10-CM

## 2017-08-15 LAB
ALBUMIN SERPL BCP-MCNC: 3.8 G/DL (ref 3.2–4.6)
ALBUMIN/GLOB SERPL: 0.9 {RATIO} (ref 1.2–3.5)
ALP SERPL-CCNC: 80 U/L (ref 50–136)
ALT SERPL-CCNC: 19 U/L (ref 12–65)
ANION GAP BLD CALC-SCNC: 5 MMOL/L (ref 7–16)
AST SERPL W P-5'-P-CCNC: 14 U/L (ref 15–37)
BASOPHILS # BLD AUTO: 0 K/UL (ref 0–0.2)
BASOPHILS # BLD: 1 % (ref 0–2)
BILIRUB SERPL-MCNC: 0.3 MG/DL (ref 0.2–1.1)
BUN SERPL-MCNC: 15 MG/DL (ref 8–23)
CALCIUM SERPL-MCNC: 9.4 MG/DL (ref 8.3–10.4)
CHLORIDE SERPL-SCNC: 107 MMOL/L (ref 98–107)
CO2 SERPL-SCNC: 29 MMOL/L (ref 21–32)
CREAT SERPL-MCNC: 0.79 MG/DL (ref 0.6–1)
DIFFERENTIAL METHOD BLD: ABNORMAL
EOSINOPHIL # BLD: 0.2 K/UL (ref 0–0.8)
EOSINOPHIL NFR BLD: 4 % (ref 0.5–7.8)
ERYTHROCYTE [DISTWIDTH] IN BLOOD BY AUTOMATED COUNT: 14 % (ref 11.9–14.6)
GLOBULIN SER CALC-MCNC: 4.1 G/DL (ref 2.3–3.5)
GLUCOSE SERPL-MCNC: 101 MG/DL (ref 65–100)
HCT VFR BLD AUTO: 30.3 % (ref 35.8–46.3)
HGB BLD-MCNC: 9.9 G/DL (ref 11.7–15.4)
LYMPHOCYTES # BLD AUTO: 35 % (ref 13–44)
LYMPHOCYTES # BLD: 1.4 K/UL (ref 0.5–4.6)
MCH RBC QN AUTO: 29.5 PG (ref 26.1–32.9)
MCHC RBC AUTO-ENTMCNC: 32.7 G/DL (ref 31.4–35)
MCV RBC AUTO: 90.2 FL (ref 79.6–97.8)
MONOCYTES # BLD: 0.4 K/UL (ref 0.1–1.3)
MONOCYTES NFR BLD AUTO: 10 % (ref 4–12)
NEUTS SEG # BLD: 2 K/UL (ref 1.7–8.2)
NEUTS SEG NFR BLD AUTO: 50 % (ref 43–78)
NRBC # BLD: 0 K/UL (ref 0–0.2)
PLATELET # BLD AUTO: 270 K/UL (ref 150–450)
PMV BLD AUTO: 9.1 FL (ref 10.8–14.1)
POTASSIUM SERPL-SCNC: 3.9 MMOL/L (ref 3.5–5.1)
PROT SERPL-MCNC: 7.9 G/DL (ref 6.3–8.2)
RBC # BLD AUTO: 3.36 M/UL (ref 4.05–5.25)
SODIUM SERPL-SCNC: 141 MMOL/L (ref 136–145)
WBC # BLD AUTO: 3.9 K/UL (ref 4.3–11.1)

## 2017-08-15 PROCEDURE — 74011250637 HC RX REV CODE- 250/637: Performed by: INTERNAL MEDICINE

## 2017-08-15 PROCEDURE — 80053 COMPREHEN METABOLIC PANEL: CPT | Performed by: INTERNAL MEDICINE

## 2017-08-15 PROCEDURE — 96375 TX/PRO/DX INJ NEW DRUG ADDON: CPT

## 2017-08-15 PROCEDURE — 74011250636 HC RX REV CODE- 250/636: Performed by: INTERNAL MEDICINE

## 2017-08-15 PROCEDURE — 96413 CHEMO IV INFUSION 1 HR: CPT

## 2017-08-15 PROCEDURE — 96361 HYDRATE IV INFUSION ADD-ON: CPT

## 2017-08-15 PROCEDURE — 85025 COMPLETE CBC W/AUTO DIFF WBC: CPT | Performed by: INTERNAL MEDICINE

## 2017-08-15 PROCEDURE — 74011250636 HC RX REV CODE- 250/636

## 2017-08-15 RX ORDER — DIPHENHYDRAMINE HYDROCHLORIDE 50 MG/ML
25 INJECTION, SOLUTION INTRAMUSCULAR; INTRAVENOUS ONCE
Status: COMPLETED | OUTPATIENT
Start: 2017-08-15 | End: 2017-08-15

## 2017-08-15 RX ORDER — ACETAMINOPHEN 325 MG/1
650 TABLET ORAL ONCE
Status: COMPLETED | OUTPATIENT
Start: 2017-08-15 | End: 2017-08-15

## 2017-08-15 RX ORDER — ACETAMINOPHEN 325 MG/1
650 TABLET ORAL AS NEEDED
Status: CANCELLED
Start: 2017-08-15

## 2017-08-15 RX ORDER — DIPHENHYDRAMINE HYDROCHLORIDE 50 MG/ML
50 INJECTION, SOLUTION INTRAMUSCULAR; INTRAVENOUS AS NEEDED
Status: CANCELLED
Start: 2017-08-15

## 2017-08-15 RX ORDER — SODIUM CHLORIDE 0.9 % (FLUSH) 0.9 %
10 SYRINGE (ML) INJECTION AS NEEDED
Status: ACTIVE | OUTPATIENT
Start: 2017-08-15 | End: 2017-08-16

## 2017-08-15 RX ORDER — HYDROCORTISONE SODIUM SUCCINATE 100 MG/2ML
100 INJECTION, POWDER, FOR SOLUTION INTRAMUSCULAR; INTRAVENOUS AS NEEDED
Status: CANCELLED | OUTPATIENT
Start: 2017-08-15

## 2017-08-15 RX ORDER — EPINEPHRINE 1 MG/ML
0.3 INJECTION, SOLUTION, CONCENTRATE INTRAVENOUS AS NEEDED
Status: CANCELLED | OUTPATIENT
Start: 2017-08-15

## 2017-08-15 RX ORDER — HEPARIN 100 UNIT/ML
300-500 SYRINGE INTRAVENOUS AS NEEDED
Status: DISPENSED | OUTPATIENT
Start: 2017-08-15 | End: 2017-08-16

## 2017-08-15 RX ORDER — ALBUTEROL SULFATE 0.83 MG/ML
2.5 SOLUTION RESPIRATORY (INHALATION) AS NEEDED
Status: CANCELLED
Start: 2017-08-15

## 2017-08-15 RX ORDER — HEPARIN SODIUM 1000 [USP'U]/ML
2000 INJECTION, SOLUTION INTRAVENOUS; SUBCUTANEOUS AS NEEDED
Status: CANCELLED
Start: 2017-08-15

## 2017-08-15 RX ORDER — ONDANSETRON 2 MG/ML
8 INJECTION INTRAMUSCULAR; INTRAVENOUS AS NEEDED
Status: CANCELLED | OUTPATIENT
Start: 2017-08-15

## 2017-08-15 RX ADMIN — ACETAMINOPHEN 650 MG: 325 TABLET, FILM COATED ORAL at 13:52

## 2017-08-15 RX ADMIN — Medication 10 ML: at 13:50

## 2017-08-15 RX ADMIN — DIPHENHYDRAMINE HYDROCHLORIDE 25 MG: 50 INJECTION, SOLUTION INTRAMUSCULAR; INTRAVENOUS at 13:55

## 2017-08-15 RX ADMIN — SODIUM CHLORIDE 500 ML: 900 INJECTION, SOLUTION INTRAVENOUS at 14:00

## 2017-08-15 RX ADMIN — TRASTUZUMAB 498 MG: 150 INJECTION, POWDER, LYOPHILIZED, FOR SOLUTION INTRAVENOUS at 14:39

## 2017-08-15 RX ADMIN — SODIUM CHLORIDE, PRESERVATIVE FREE 500 UNITS: 5 INJECTION INTRAVENOUS at 15:20

## 2017-08-15 RX ADMIN — Medication 10 ML: at 15:20

## 2017-08-15 NOTE — PROGRESS NOTES
Arrived to the [facility]. Herceptin completed. Patient tolerated well. Any issues or concerns during appointment: NO.  Patient aware of next infusion appointment on 09/05/17 (date) at 0 (time). Discharged ambulatory.

## 2017-09-01 ENCOUNTER — HOSPITAL ENCOUNTER (OUTPATIENT)
Dept: NON INVASIVE DIAGNOSTICS | Age: 62
Discharge: HOME | End: 2017-09-01
Attending: INTERNAL MEDICINE
Payer: OTHER GOVERNMENT

## 2017-09-01 DIAGNOSIS — Z51.81 ENCOUNTER FOR MONITORING CARDIOTOXIC DRUG THERAPY: ICD-10-CM

## 2017-09-01 DIAGNOSIS — Z79.899 ENCOUNTER FOR MONITORING CARDIOTOXIC DRUG THERAPY: ICD-10-CM

## 2017-09-01 PROCEDURE — 93306 TTE W/DOPPLER COMPLETE: CPT

## 2017-09-05 ENCOUNTER — HOSPITAL ENCOUNTER (OUTPATIENT)
Dept: LAB | Age: 62
Discharge: HOME | End: 2017-09-05
Payer: OTHER GOVERNMENT

## 2017-09-05 ENCOUNTER — PATIENT OUTREACH (OUTPATIENT)
Dept: CASE MANAGEMENT | Age: 62
End: 2017-09-05

## 2017-09-05 ENCOUNTER — HOSPITAL ENCOUNTER (OUTPATIENT)
Dept: INFUSION THERAPY | Age: 62
Discharge: HOME | End: 2017-09-05
Payer: OTHER GOVERNMENT

## 2017-09-05 DIAGNOSIS — C50.911 INFILTRATING DUCTAL CARCINOMA OF RIGHT BREAST (HCC): ICD-10-CM

## 2017-09-05 LAB
ALBUMIN SERPL-MCNC: 3.8 G/DL (ref 3.2–4.6)
ALBUMIN/GLOB SERPL: 0.9 {RATIO} (ref 1.2–3.5)
ALP SERPL-CCNC: 81 U/L (ref 50–136)
ALT SERPL-CCNC: 20 U/L (ref 12–65)
ANION GAP SERPL CALC-SCNC: 6 MMOL/L (ref 7–16)
AST SERPL-CCNC: 15 U/L (ref 15–37)
BASOPHILS # BLD: 0 K/UL (ref 0–0.2)
BASOPHILS NFR BLD: 0 % (ref 0–2)
BILIRUB SERPL-MCNC: 0.4 MG/DL (ref 0.2–1.1)
BUN SERPL-MCNC: 16 MG/DL (ref 8–23)
CALCIUM SERPL-MCNC: 9.8 MG/DL (ref 8.3–10.4)
CHLORIDE SERPL-SCNC: 107 MMOL/L (ref 98–107)
CO2 SERPL-SCNC: 29 MMOL/L (ref 21–32)
CREAT SERPL-MCNC: 0.75 MG/DL (ref 0.6–1)
DIFFERENTIAL METHOD BLD: ABNORMAL
EOSINOPHIL # BLD: 0.1 K/UL (ref 0–0.8)
EOSINOPHIL NFR BLD: 3 % (ref 0.5–7.8)
ERYTHROCYTE [DISTWIDTH] IN BLOOD BY AUTOMATED COUNT: 13.7 % (ref 11.9–14.6)
GLOBULIN SER CALC-MCNC: 4.2 G/DL (ref 2.3–3.5)
GLUCOSE SERPL-MCNC: 83 MG/DL (ref 65–100)
HCT VFR BLD AUTO: 30.1 % (ref 35.8–46.3)
HGB BLD-MCNC: 10.1 G/DL (ref 11.7–15.4)
LYMPHOCYTES # BLD: 1.5 K/UL (ref 0.5–4.6)
LYMPHOCYTES NFR BLD: 35 % (ref 13–44)
MCH RBC QN AUTO: 29.8 PG (ref 26.1–32.9)
MCHC RBC AUTO-ENTMCNC: 33.6 G/DL (ref 31.4–35)
MCV RBC AUTO: 88.8 FL (ref 79.6–97.8)
MONOCYTES # BLD: 0.5 K/UL (ref 0.1–1.3)
MONOCYTES NFR BLD: 11 % (ref 4–12)
NEUTS SEG # BLD: 2.2 K/UL (ref 1.7–8.2)
NEUTS SEG NFR BLD: 52 % (ref 43–78)
NRBC # BLD: 0.01 K/UL (ref 0–0.2)
PLATELET # BLD AUTO: 273 K/UL (ref 150–450)
PMV BLD AUTO: 9.2 FL (ref 10.8–14.1)
POTASSIUM SERPL-SCNC: 3.8 MMOL/L (ref 3.5–5.1)
PROT SERPL-MCNC: 8 G/DL (ref 6.3–8.2)
RBC # BLD AUTO: 3.39 M/UL (ref 4.05–5.25)
SODIUM SERPL-SCNC: 142 MMOL/L (ref 136–145)
WBC # BLD AUTO: 4.3 K/UL (ref 4.3–11.1)

## 2017-09-05 PROCEDURE — 96375 TX/PRO/DX INJ NEW DRUG ADDON: CPT

## 2017-09-05 PROCEDURE — 96413 CHEMO IV INFUSION 1 HR: CPT

## 2017-09-05 PROCEDURE — 74011250637 HC RX REV CODE- 250/637: Performed by: INTERNAL MEDICINE

## 2017-09-05 PROCEDURE — 80053 COMPREHEN METABOLIC PANEL: CPT | Performed by: INTERNAL MEDICINE

## 2017-09-05 PROCEDURE — 74011250636 HC RX REV CODE- 250/636: Performed by: INTERNAL MEDICINE

## 2017-09-05 PROCEDURE — 74011250636 HC RX REV CODE- 250/636

## 2017-09-05 PROCEDURE — 85025 COMPLETE CBC W/AUTO DIFF WBC: CPT | Performed by: INTERNAL MEDICINE

## 2017-09-05 RX ORDER — ACETAMINOPHEN 325 MG/1
650 TABLET ORAL ONCE
Status: COMPLETED | OUTPATIENT
Start: 2017-09-05 | End: 2017-09-05

## 2017-09-05 RX ORDER — HEPARIN 100 UNIT/ML
300-500 SYRINGE INTRAVENOUS AS NEEDED
Status: DISPENSED | OUTPATIENT
Start: 2017-09-05 | End: 2017-09-06

## 2017-09-05 RX ORDER — SODIUM CHLORIDE 0.9 % (FLUSH) 0.9 %
10 SYRINGE (ML) INJECTION AS NEEDED
Status: ACTIVE | OUTPATIENT
Start: 2017-09-05 | End: 2017-09-06

## 2017-09-05 RX ORDER — DIPHENHYDRAMINE HYDROCHLORIDE 50 MG/ML
25 INJECTION, SOLUTION INTRAMUSCULAR; INTRAVENOUS ONCE
Status: COMPLETED | OUTPATIENT
Start: 2017-09-05 | End: 2017-09-05

## 2017-09-05 RX ADMIN — DIPHENHYDRAMINE HYDROCHLORIDE 25 MG: 50 INJECTION, SOLUTION INTRAMUSCULAR; INTRAVENOUS at 16:54

## 2017-09-05 RX ADMIN — Medication 10 ML: at 17:58

## 2017-09-05 RX ADMIN — SODIUM CHLORIDE, PRESERVATIVE FREE 500 UNITS: 5 INJECTION INTRAVENOUS at 17:58

## 2017-09-05 RX ADMIN — TRASTUZUMAB 498 MG: 150 INJECTION, POWDER, LYOPHILIZED, FOR SOLUTION INTRAVENOUS at 17:25

## 2017-09-05 RX ADMIN — SODIUM CHLORIDE 500 ML: 900 INJECTION, SOLUTION INTRAVENOUS at 16:56

## 2017-09-05 RX ADMIN — ACETAMINOPHEN 650 MG: 325 TABLET, FILM COATED ORAL at 16:52

## 2017-09-05 NOTE — PROGRESS NOTES
Arrived to the [facility]. Herceptin  completed.  Patient tolerated without problems  Any issues or concerns during appointment: no  Patient aware of next infusion appointment on 9/26/17 at 1315  Discharged ambulatory with family

## 2017-09-05 NOTE — PROGRESS NOTES
9/5/2017 Saw the patient with Dr Gregory Otero. She is having some significant joint aches. She is not sure if this is due to the Arimidex or the Herceptin. Her aching is worse at night and it is interrupting her sleep. Her echo 9/1 showed EF of 55%. Will return to see Dr Gregory Otero in November after mammogram.  Will continue to follow.

## 2017-09-26 ENCOUNTER — HOSPITAL ENCOUNTER (OUTPATIENT)
Dept: INFUSION THERAPY | Age: 62
Discharge: HOME | End: 2017-09-26
Payer: OTHER GOVERNMENT

## 2017-09-26 VITALS
HEART RATE: 92 BPM | WEIGHT: 188.6 LBS | SYSTOLIC BLOOD PRESSURE: 135 MMHG | RESPIRATION RATE: 16 BRPM | OXYGEN SATURATION: 97 % | DIASTOLIC BLOOD PRESSURE: 78 MMHG | BODY MASS INDEX: 33.41 KG/M2 | TEMPERATURE: 99 F

## 2017-09-26 DIAGNOSIS — C50.911 INFILTRATING DUCTAL CARCINOMA OF RIGHT BREAST (HCC): ICD-10-CM

## 2017-09-26 LAB
ALBUMIN SERPL-MCNC: 3.6 G/DL (ref 3.2–4.6)
ALBUMIN/GLOB SERPL: 0.9 {RATIO} (ref 1.2–3.5)
ALP SERPL-CCNC: 73 U/L (ref 50–136)
ALT SERPL-CCNC: 19 U/L (ref 12–65)
ANION GAP SERPL CALC-SCNC: 5 MMOL/L (ref 7–16)
AST SERPL-CCNC: 14 U/L (ref 15–37)
BASOPHILS # BLD: 0 K/UL (ref 0–0.2)
BASOPHILS NFR BLD: 0 % (ref 0–2)
BILIRUB SERPL-MCNC: 0.4 MG/DL (ref 0.2–1.1)
BUN SERPL-MCNC: 17 MG/DL (ref 8–23)
CALCIUM SERPL-MCNC: 9.4 MG/DL (ref 8.3–10.4)
CHLORIDE SERPL-SCNC: 108 MMOL/L (ref 98–107)
CO2 SERPL-SCNC: 29 MMOL/L (ref 21–32)
CREAT SERPL-MCNC: 0.76 MG/DL (ref 0.6–1)
DIFFERENTIAL METHOD BLD: ABNORMAL
EOSINOPHIL # BLD: 0.1 K/UL (ref 0–0.8)
EOSINOPHIL NFR BLD: 2 % (ref 0.5–7.8)
ERYTHROCYTE [DISTWIDTH] IN BLOOD BY AUTOMATED COUNT: 13.8 % (ref 11.9–14.6)
GLOBULIN SER CALC-MCNC: 3.9 G/DL (ref 2.3–3.5)
GLUCOSE SERPL-MCNC: 96 MG/DL (ref 65–100)
HCT VFR BLD AUTO: 29.6 % (ref 35.8–46.3)
HGB BLD-MCNC: 9.7 G/DL (ref 11.7–15.4)
LYMPHOCYTES # BLD: 1.4 K/UL (ref 0.5–4.6)
LYMPHOCYTES NFR BLD: 36 % (ref 13–44)
MCH RBC QN AUTO: 29.2 PG (ref 26.1–32.9)
MCHC RBC AUTO-ENTMCNC: 32.8 G/DL (ref 31.4–35)
MCV RBC AUTO: 89.2 FL (ref 79.6–97.8)
MONOCYTES # BLD: 0.5 K/UL (ref 0.1–1.3)
MONOCYTES NFR BLD: 13 % (ref 4–12)
NEUTS SEG # BLD: 1.9 K/UL (ref 1.7–8.2)
NEUTS SEG NFR BLD: 49 % (ref 43–78)
NRBC # BLD: 0 K/UL (ref 0–0.2)
PLATELET # BLD AUTO: 257 K/UL (ref 150–450)
PMV BLD AUTO: 9.4 FL (ref 10.8–14.1)
POTASSIUM SERPL-SCNC: 3.4 MMOL/L (ref 3.5–5.1)
PROT SERPL-MCNC: 7.5 G/DL (ref 6.3–8.2)
RBC # BLD AUTO: 3.32 M/UL (ref 4.05–5.25)
SODIUM SERPL-SCNC: 142 MMOL/L (ref 136–145)
WBC # BLD AUTO: 3.8 K/UL (ref 4.3–11.1)

## 2017-09-26 PROCEDURE — 85025 COMPLETE CBC W/AUTO DIFF WBC: CPT | Performed by: INTERNAL MEDICINE

## 2017-09-26 PROCEDURE — 74011250636 HC RX REV CODE- 250/636: Performed by: INTERNAL MEDICINE

## 2017-09-26 PROCEDURE — 74011250637 HC RX REV CODE- 250/637: Performed by: INTERNAL MEDICINE

## 2017-09-26 PROCEDURE — 96413 CHEMO IV INFUSION 1 HR: CPT

## 2017-09-26 PROCEDURE — 80053 COMPREHEN METABOLIC PANEL: CPT | Performed by: INTERNAL MEDICINE

## 2017-09-26 PROCEDURE — 96375 TX/PRO/DX INJ NEW DRUG ADDON: CPT

## 2017-09-26 PROCEDURE — 77030003560 HC NDL HUBR BARD -A

## 2017-09-26 PROCEDURE — 74011250636 HC RX REV CODE- 250/636

## 2017-09-26 RX ORDER — SODIUM CHLORIDE 0.9 % (FLUSH) 0.9 %
10-40 SYRINGE (ML) INJECTION AS NEEDED
Status: DISCONTINUED | OUTPATIENT
Start: 2017-09-26 | End: 2017-09-30 | Stop reason: HOSPADM

## 2017-09-26 RX ORDER — FLUTICASONE PROPIONATE 50 MCG
2 SPRAY, SUSPENSION (ML) NASAL AS NEEDED
COMMUNITY
End: 2018-09-25

## 2017-09-26 RX ORDER — DIPHENHYDRAMINE HYDROCHLORIDE 50 MG/ML
25 INJECTION, SOLUTION INTRAMUSCULAR; INTRAVENOUS ONCE
Status: COMPLETED | OUTPATIENT
Start: 2017-09-26 | End: 2017-09-26

## 2017-09-26 RX ORDER — ACETAMINOPHEN 325 MG/1
650 TABLET ORAL ONCE
Status: COMPLETED | OUTPATIENT
Start: 2017-09-26 | End: 2017-09-26

## 2017-09-26 RX ORDER — HEPARIN 100 UNIT/ML
300-500 SYRINGE INTRAVENOUS AS NEEDED
Status: DISPENSED | OUTPATIENT
Start: 2017-09-26 | End: 2017-09-27

## 2017-09-26 RX ADMIN — SODIUM CHLORIDE 500 ML: 900 INJECTION, SOLUTION INTRAVENOUS at 14:00

## 2017-09-26 RX ADMIN — Medication 10 ML: at 14:00

## 2017-09-26 RX ADMIN — Medication 10 ML: at 15:35

## 2017-09-26 RX ADMIN — TRASTUZUMAB 498 MG: 150 INJECTION, POWDER, LYOPHILIZED, FOR SOLUTION INTRAVENOUS at 14:42

## 2017-09-26 RX ADMIN — SODIUM CHLORIDE, PRESERVATIVE FREE 500 UNITS: 5 INJECTION INTRAVENOUS at 15:35

## 2017-09-26 RX ADMIN — ACETAMINOPHEN 650 MG: 325 TABLET, FILM COATED ORAL at 14:12

## 2017-09-26 RX ADMIN — DIPHENHYDRAMINE HYDROCHLORIDE 25 MG: 50 INJECTION, SOLUTION INTRAMUSCULAR; INTRAVENOUS at 14:13

## 2017-09-26 NOTE — PROGRESS NOTES
Arrived to the [facility]. Assessment completed. Patient tolerated chemotherapy well today. Any issues or concerns during appointment: none. Patient aware of next OV on 11/7/17 at 1045. (lab appt. Will be at 10 :13)  Discharged ambulatory, with daughter. Patient instructed to call DR. Blanton's office immediately for any problems or concerns. She verbalizes understanding.

## 2017-11-02 ENCOUNTER — HOSPITAL ENCOUNTER (OUTPATIENT)
Dept: MAMMOGRAPHY | Age: 62
Discharge: HOME | End: 2017-11-02
Attending: NURSE PRACTITIONER
Payer: OTHER GOVERNMENT

## 2017-11-02 DIAGNOSIS — R92.8 ABNORMAL MAMMOGRAM OF RIGHT BREAST: ICD-10-CM

## 2017-11-02 DIAGNOSIS — C50.911 MALIGNANT NEOPLASM OF RIGHT FEMALE BREAST (HCC): ICD-10-CM

## 2017-11-02 PROCEDURE — 77065 DX MAMMO INCL CAD UNI: CPT

## 2017-11-07 ENCOUNTER — HOSPITAL ENCOUNTER (OUTPATIENT)
Dept: LAB | Age: 62
Discharge: HOME | End: 2017-11-07
Payer: OTHER GOVERNMENT

## 2017-11-07 DIAGNOSIS — C50.911 INFILTRATING DUCTAL CARCINOMA OF RIGHT BREAST (HCC): ICD-10-CM

## 2017-11-07 LAB
ALBUMIN SERPL-MCNC: 3.8 G/DL (ref 3.2–4.6)
ALBUMIN/GLOB SERPL: 0.9 {RATIO} (ref 1.2–3.5)
ALP SERPL-CCNC: 88 U/L (ref 50–136)
ALT SERPL-CCNC: 20 U/L (ref 12–65)
ANION GAP SERPL CALC-SCNC: 8 MMOL/L (ref 7–16)
AST SERPL-CCNC: 12 U/L (ref 15–37)
BASOPHILS # BLD: 0 K/UL (ref 0–0.2)
BASOPHILS NFR BLD: 0 % (ref 0–2)
BILIRUB SERPL-MCNC: 0.4 MG/DL (ref 0.2–1.1)
BUN SERPL-MCNC: 11 MG/DL (ref 8–23)
CALCIUM SERPL-MCNC: 9.6 MG/DL (ref 8.3–10.4)
CHLORIDE SERPL-SCNC: 104 MMOL/L (ref 98–107)
CO2 SERPL-SCNC: 30 MMOL/L (ref 21–32)
CREAT SERPL-MCNC: 0.87 MG/DL (ref 0.6–1)
DIFFERENTIAL METHOD BLD: ABNORMAL
EOSINOPHIL # BLD: 0.1 K/UL (ref 0–0.8)
EOSINOPHIL NFR BLD: 3 % (ref 0.5–7.8)
ERYTHROCYTE [DISTWIDTH] IN BLOOD BY AUTOMATED COUNT: 13.3 % (ref 11.9–14.6)
GLOBULIN SER CALC-MCNC: 4.2 G/DL (ref 2.3–3.5)
GLUCOSE SERPL-MCNC: 128 MG/DL (ref 65–100)
HCT VFR BLD AUTO: 31.7 % (ref 35.8–46.3)
HGB BLD-MCNC: 10.6 G/DL (ref 11.7–15.4)
LYMPHOCYTES # BLD: 1.5 K/UL (ref 0.5–4.6)
LYMPHOCYTES NFR BLD: 35 % (ref 13–44)
MCH RBC QN AUTO: 29.4 PG (ref 26.1–32.9)
MCHC RBC AUTO-ENTMCNC: 33.4 G/DL (ref 31.4–35)
MCV RBC AUTO: 88.1 FL (ref 79.6–97.8)
MONOCYTES # BLD: 0.4 K/UL (ref 0.1–1.3)
MONOCYTES NFR BLD: 10 % (ref 4–12)
NEUTS SEG # BLD: 2.2 K/UL (ref 1.7–8.2)
NEUTS SEG NFR BLD: 52 % (ref 43–78)
NRBC # BLD: 0 K/UL (ref 0–0.2)
PLATELET # BLD AUTO: 279 K/UL (ref 150–450)
PMV BLD AUTO: 9.2 FL (ref 10.8–14.1)
POTASSIUM SERPL-SCNC: 3.5 MMOL/L (ref 3.5–5.1)
PROT SERPL-MCNC: 8 G/DL (ref 6.3–8.2)
RBC # BLD AUTO: 3.6 M/UL (ref 4.05–5.25)
SODIUM SERPL-SCNC: 142 MMOL/L (ref 136–145)
WBC # BLD AUTO: 4.3 K/UL (ref 4.3–11.1)

## 2017-11-07 PROCEDURE — 85025 COMPLETE CBC W/AUTO DIFF WBC: CPT | Performed by: INTERNAL MEDICINE

## 2017-11-07 PROCEDURE — 80053 COMPREHEN METABOLIC PANEL: CPT | Performed by: INTERNAL MEDICINE

## 2017-11-16 ENCOUNTER — HOSPITAL ENCOUNTER (OUTPATIENT)
Dept: INTERVENTIONAL RADIOLOGY/VASCULAR | Age: 62
Discharge: HOME | End: 2017-11-16
Attending: INTERNAL MEDICINE
Payer: OTHER GOVERNMENT

## 2017-11-16 VITALS
TEMPERATURE: 98.7 F | RESPIRATION RATE: 16 BRPM | OXYGEN SATURATION: 92 % | WEIGHT: 190 LBS | HEART RATE: 80 BPM | DIASTOLIC BLOOD PRESSURE: 58 MMHG | SYSTOLIC BLOOD PRESSURE: 115 MMHG | HEIGHT: 64 IN | BODY MASS INDEX: 32.44 KG/M2

## 2017-11-16 DIAGNOSIS — Z95.828 PORT CATHETER IN PLACE: ICD-10-CM

## 2017-11-16 LAB — INR BLD: NORMAL (ref 11–14)

## 2017-11-16 PROCEDURE — 77030020851 HC CAUT BTRY HI AARM -A

## 2017-11-16 PROCEDURE — 74011000250 HC RX REV CODE- 250: Performed by: RADIOLOGY

## 2017-11-16 PROCEDURE — 74011250636 HC RX REV CODE- 250/636

## 2017-11-16 PROCEDURE — 99152 MOD SED SAME PHYS/QHP 5/>YRS: CPT

## 2017-11-16 PROCEDURE — 85610 PROTHROMBIN TIME: CPT | Performed by: INTERNAL MEDICINE

## 2017-11-16 PROCEDURE — 77030002916 HC SUT ETHLN J&J -A

## 2017-11-16 PROCEDURE — 77030031139 HC SUT VCRL2 J&J -A

## 2017-11-16 PROCEDURE — 99153 MOD SED SAME PHYS/QHP EA: CPT

## 2017-11-16 PROCEDURE — 36590 REMOVAL TUNNELED CV CATH: CPT

## 2017-11-16 RX ORDER — SODIUM CHLORIDE 9 MG/ML
25 INJECTION, SOLUTION INTRAVENOUS ONCE
Status: DISCONTINUED | OUTPATIENT
Start: 2017-11-16 | End: 2017-11-16 | Stop reason: ALTCHOICE

## 2017-11-16 RX ORDER — LIDOCAINE HYDROCHLORIDE AND EPINEPHRINE 20; 5 MG/ML; UG/ML
2-10 INJECTION, SOLUTION EPIDURAL; INFILTRATION; INTRACAUDAL; PERINEURAL ONCE
Status: COMPLETED | OUTPATIENT
Start: 2017-11-16 | End: 2017-11-16

## 2017-11-16 RX ORDER — FENTANYL CITRATE 50 UG/ML
25-50 INJECTION, SOLUTION INTRAMUSCULAR; INTRAVENOUS
Status: DISCONTINUED | OUTPATIENT
Start: 2017-11-16 | End: 2017-11-16 | Stop reason: ALTCHOICE

## 2017-11-16 RX ORDER — DIPHENHYDRAMINE HYDROCHLORIDE 50 MG/ML
25-50 INJECTION, SOLUTION INTRAMUSCULAR; INTRAVENOUS ONCE
Status: DISCONTINUED | OUTPATIENT
Start: 2017-11-16 | End: 2017-11-16 | Stop reason: ALTCHOICE

## 2017-11-16 RX ORDER — LIDOCAINE HYDROCHLORIDE 20 MG/ML
1-10 INJECTION, SOLUTION INFILTRATION; PERINEURAL ONCE
Status: COMPLETED | OUTPATIENT
Start: 2017-11-16 | End: 2017-11-16

## 2017-11-16 RX ORDER — MIDAZOLAM HYDROCHLORIDE 1 MG/ML
.5-2 INJECTION, SOLUTION INTRAMUSCULAR; INTRAVENOUS
Status: DISCONTINUED | OUTPATIENT
Start: 2017-11-16 | End: 2017-11-16 | Stop reason: ALTCHOICE

## 2017-11-16 RX ADMIN — LIDOCAINE HYDROCHLORIDE,EPINEPHRINE BITARTRATE 120 MG: 20; .005 INJECTION, SOLUTION EPIDURAL; INFILTRATION; INTRACAUDAL; PERINEURAL at 09:51

## 2017-11-16 RX ADMIN — FENTANYL CITRATE 50 MCG: 50 INJECTION, SOLUTION INTRAMUSCULAR; INTRAVENOUS at 09:30

## 2017-11-16 RX ADMIN — FENTANYL CITRATE 50 MCG: 50 INJECTION, SOLUTION INTRAMUSCULAR; INTRAVENOUS at 09:40

## 2017-11-16 RX ADMIN — LIDOCAINE HYDROCHLORIDE 160 MG: 20 INJECTION, SOLUTION INFILTRATION; PERINEURAL at 09:51

## 2017-11-16 RX ADMIN — MIDAZOLAM HYDROCHLORIDE 0.5 MG: 1 INJECTION, SOLUTION INTRAMUSCULAR; INTRAVENOUS at 09:46

## 2017-11-16 RX ADMIN — MIDAZOLAM HYDROCHLORIDE 1 MG: 1 INJECTION, SOLUTION INTRAMUSCULAR; INTRAVENOUS at 09:30

## 2017-11-16 RX ADMIN — MIDAZOLAM HYDROCHLORIDE 1 MG: 1 INJECTION, SOLUTION INTRAMUSCULAR; INTRAVENOUS at 09:40

## 2017-11-16 RX ADMIN — FENTANYL CITRATE 25 MCG: 50 INJECTION, SOLUTION INTRAMUSCULAR; INTRAVENOUS at 09:46

## 2017-11-16 NOTE — PROCEDURES
Department of Interventional Radiology  [phone]        Interventional Radiology Brief Procedure Note    Patient: Nina Rosales MRN: [id number]  SSN: [id number]    YOB: 1955  Age: 62 y.o. Sex: female      Date of Procedure: 11/16/2017    Pre-Procedure Diagnosis: Cancer Done with chemo. Post-Procedure Diagnosis: SAME    Procedure(s): Venous Chest Port removal   Performed By: Luisa Cooney MD     Assistants: None    Anesthesia:Moderate Sedation    Estimated Blood Loss: Less than 10ml    Specimens: port    Implants: None    Findings: Clean pocket    Complications: None    Follow Up: prn.      Signed By: Luisa Cooney MD     November 16, 2017

## 2017-11-16 NOTE — PROGRESS NOTES
IR Nurse Pre-Procedure Checklist Part 2          Consent signed: Yes    H&P complete:  Yes    Antibiotics: Not applicable    Airway/Mallampati Done: Yes    Shaved: Not applicable    Pregnancy Form:Not applicable    Patient Position: Yes    MD Side: Yes     Biopsy Worksheet: Not applicable    Specimen Medium: Not applicable

## 2017-11-16 NOTE — H&P
Department of Interventional Radiology  [phone]    History and Physical    Patient:  Josseline Sharp MRN:  [id number]  SSN:  [id number]    YOB: 1955  Age:  62 y.o. Sex:  female      Primary Care Provider:  Jovana Quintero DO  Referring Physician:  Kevin Sood MD    Subjective:     Chief Complaint: port removal    History of the Present Illness: The patient is a 62 y.o. female who presents for venous chest port removal. Therapy complete for breast cancer. Wearing heart monitor x 2 weeks for palpitations. Short run of atrial fibrillation post op port, but quickly resolved. Hoarse this week, sore throat resolved. No fevers. Feels well. NPO x BP meds. Past Medical History:   Diagnosis Date    Anemia >20yrs ago    Cancer 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    Enlarged uterus 5/31/2016    Hypertension     managed with medication    Murmur, functional     diagnosed as teenager    Obesity 5/31/2016    Osteopenia     Paroxysmal atrial fibrillation ([facility] [address] 09/11/2001    Psychiatric disorder     anxiety    Sleep apnea 09/11/2013    can not tolerate c pap    Vaginal atrophy 5/31/2016     Past Surgical History:   Procedure Laterality Date    HX BREAST BIOPSY Right 8/26/2016    RIGHT BREAST NEEDLE LOCALIZED BIOPSY performed by Bella Bhatt MD     HX BREAST LUMPECTOMY Right 8/26/2016    RIGHT BREAST LUMPECTOMY performed by Bella Bhatt MD     HX BREAST LUMPECTOMY Right 9/23/2016    RIGHT BREAST LUMPECTOMY/PARTIAL REVISION  performed by Bella Bhatt    HX COLONOSCOPY  2007    diverticulosis    HX ORTHOPAEDIC      left wrist ORIF    HX TUBAL LIGATION      HX VASCULAR ACCESS      NELA BIOPSY BREAST STEREOTACTIC Right 7.19.16        Review of Systems:    Pertinent items are noted in the History of Present Illness.     Current Outpatient Prescriptions   Medication Sig    naproxen sodium (ALEVE) 220 mg cap Take  by mouth.  magnesium 250 mg tab Take  by mouth.  CALCIUM CARBONATE/VITAMIN D3 (CALTRATE 600 + D PO) Take 2 Tabs by mouth daily.  sertraline (ZOLOFT) 100 mg tablet TAKE 1 TABLET DAILY    lisinopril (PRINIVIL, ZESTRIL) 40 mg tablet Take 1 Tab by mouth daily.  hydroCHLOROthiazide (HYDRODIURIL) 50 mg tablet Take 1 Tab by mouth daily. Indications: hypertension    atorvastatin (LIPITOR) 40 mg tablet Take 1 Tab by mouth nightly.  amLODIPine (NORVASC) 10 mg tablet Take 1 Tab by mouth daily.  biotin 10,000 mcg cap Take 1 Cap by mouth daily.  DIPHENHYDRAMINE HCL (BENADRYL PO) Take  by mouth as needed.  aspirin (JESSICA CHEWABLE ASPIRIN) 81 mg chewable tablet Take 81 mg by mouth daily.  exemestane (AROMASIN) 25 mg tablet Take 1 Tab by mouth daily for 90 days.  fluticasone (FLONASE) 50 mcg/actuation nasal spray 2 Sprays by Both Nostrils route daily.  zolpidem (AMBIEN) 10 mg tablet Take 1 Tab by mouth nightly as needed for Sleep. Max Daily Amount: 10 mg. Indications: SLEEP-ONSET INSOMNIA    lidocaine-prilocaine (EMLA) topical cream Apply  to affected area as needed for Pain. Apply to port site 45-60 minutes prior to lab appt or infusion.  LOPERAMIDE HCL (IMODIUM PO) Take  by mouth as needed.  ondansetron hcl (ZOFRAN, AS HYDROCHLORIDE,) 8 mg tablet Take 1 Tab by mouth every eight (8) hours as needed for Nausea.  LORazepam (ATIVAN) 0.5 mg tablet Take 1 Tab by mouth daily as needed for Anxiety.  (Patient taking differently: Take 0.5 mg by mouth as needed for Anxiety.)     Current Facility-Administered Medications   Medication Dose Route Frequency    lidocaine (XYLOCAINE) 20 mg/mL (2 %) injection  mg  1-10 mL SubCUTAneous ONCE    lidocaine-EPINEPHrine (PF) (XYLOCAINE) 2 %-1:200,000 injection  mg  2-10 mL SubCUTAneous ONCE    diphenhydrAMINE (BENADRYL) injection 25-50 mg  25-50 mg IntraVENous ONCE    fentaNYL citrate (PF) injection 25-50 mcg  25-50 mcg IntraVENous Multiple    midazolam (VERSED) injection 0.5-2 mg  0.5-2 mg IntraVENous Multiple    meperidine (DEMEROL) injection 25-50 mg  25-50 mg IntraVENous ONCE    0.9% sodium chloride infusion  25 mL/hr IntraVENous ONCE        Allergies   Allergen Reactions    Pcn [Penicillins] Hives       Family History   Problem Relation Age of Onset    Diabetes Mother    [facility] Alzheimer Mother     No Known Problems Father     Breast Cancer Maternal Aunt      Social History   Substance Use Topics    Smoking status: Never Smoker    Smokeless tobacco: Never Used    Alcohol use No        Objective:       Physical Examination:    Vitals:    11/16/17 0842   BP: (!) 169/92   Pulse: 84   Temp: 98.7 °F (37.1 °C)   SpO2: 98%   Weight: 86.2 kg (190 lb)   Height: 5' 3.5\" (1.613 m)     Blood pressure (!) 169/92, pulse 84, temperature 98.7 °F (37.1 °C), height 5' 3.5\" (1.613 m), weight 86.2 kg (190 lb), SpO2 98 %, not currently breastfeeding.   HEART: regular rate and rhythm  LUNG: clear to auscultation bilaterally  ABDOMEN: normal findings: soft, non-tender  EXTREMITIES: normal strength, tone, and muscle mass, no deformities    Laboratory:     Lab Results   Component Value Date/Time    Sodium 142 11/07/2017 10:37 AM    Sodium 142 09/26/2017 02:10 PM    Potassium 3.5 11/07/2017 10:37 AM    Potassium 3.4 09/26/2017 02:10 PM    Chloride 104 11/07/2017 10:37 AM    Chloride 108 09/26/2017 02:10 PM    CO2 30 11/07/2017 10:37 AM    CO2 29 09/26/2017 02:10 PM    Anion gap 8 11/07/2017 10:37 AM    Anion gap 5 09/26/2017 02:10 PM    Glucose 128 11/07/2017 10:37 AM    Glucose 96 09/26/2017 02:10 PM    BUN 11 11/07/2017 10:37 AM    BUN 17 09/26/2017 02:10 PM    Creatinine 0.87 11/07/2017 10:37 AM    Creatinine 0.76 09/26/2017 02:10 PM    GFR est AA >60 11/07/2017 10:37 AM    GFR est AA >60 09/26/2017 02:10 PM    GFR est non-AA >60 11/07/2017 10:37 AM    GFR est non-AA >60 09/26/2017 02:10 PM    Calcium 9.6 11/07/2017 10:37 AM    Calcium 9.4 09/26/2017 02:10 PM    Magnesium 2.1 11/01/2016 10:40 AM    Albumin 3.8 11/07/2017 10:37 AM    Albumin 3.6 09/26/2017 02:10 PM    Protein, total 8.0 11/07/2017 10:37 AM    Protein, total 7.5 09/26/2017 02:10 PM    Globulin 4.2 11/07/2017 10:37 AM    Globulin 3.9 09/26/2017 02:10 PM    A-G Ratio 0.9 11/07/2017 10:37 AM    A-G Ratio 0.9 09/26/2017 02:10 PM    AST (SGOT) 12 11/07/2017 10:37 AM    AST (SGOT) 14 09/26/2017 02:10 PM    ALT (SGPT) 20 11/07/2017 10:37 AM    ALT (SGPT) 19 09/26/2017 02:10 PM     Lab Results   Component Value Date/Time    WBC 4.3 11/07/2017 10:37 AM    WBC 3.8 09/26/2017 02:10 PM    HGB 10.6 11/07/2017 10:37 AM    HGB 9.7 09/26/2017 02:10 PM    HCT 31.7 11/07/2017 10:37 AM    HCT 29.6 09/26/2017 02:10 PM    PLATELET 858 20/18/5485 10:37 AM    PLATELET 122 37/88/3207 02:10 PM     Lab Results   Component Value Date/Time    aPTT 25.4 10/12/2016 01:16 PM    Prothrombin time 9.9 10/12/2016 01:16 PM    INR 1.0 10/12/2016 01:16 PM       Assessment:     Breast cancer-treated    Plan:     Planned Procedure:  Port removal    Risks, benefits, and alternatives reviewed with patient and she agrees to proceed with the procedure.       Signed By: Lauro Tijerina PA-C     November 16, 2017

## 2017-11-16 NOTE — PROGRESS NOTES
TRANSFER - OUT REPORT:    Verbal report given to Jacey Goldstein on Michelle Richardson  being transferred to IR PACU for routine post - op       Report consisted of patients Situation, Background, Assessment and   Recommendations(SBAR). Information from the following report(s) SBAR, Kardex, Procedure Summary and MAR was reviewed with the receiving nurse. Lines:   Peripheral IV 11/16/17 Right Antecubital (Active)   Site Assessment Clean, dry, & intact 11/16/2017  8:43 AM   Phlebitis Assessment 0 11/16/2017  8:43 AM   Infiltration Assessment 0 11/16/2017  8:43 AM   Dressing Status Clean, dry, & intact 11/16/2017  8:43 AM   Dressing Type Tape;Transparent 11/16/2017  8:43 AM   Hub Color/Line Status Flushed 11/16/2017  8:43 AM        Opportunity for questions and clarification was provided.

## 2017-11-16 NOTE — DISCHARGE INSTRUCTIONS
[address]  Department of Interventional Radiology  [facility]  [phone] Office  [phone] Fax    DRAIN/PORT/CATHETER REMOVAL  DISCHARGE INSTRUCTIONS    General Information:     Your doctor has ordered for us to remove your drain, port, or catheter. This could be that you do not need it anymore, it is not doing its job, your physician has decided on another plan for your treatment and/or it may need replacing. Home Care Instructions: You can resume your regular diet and medication regimen. Do not drink alcohol, drive, or make any important legal decisions in the next 24 hours. Do not lift anything heavier than a gallon of milk, or do anything strenuous for the next 24 hours. You will notice a dressing over the site of the removal. This dressing should stay in place until the site is healed. The dressing should be changed at least every 48 hours. You should change the dressing sooner if it becomes soiled or wet. The nurse who discharges you to home should review with you any wound care instructions. Resume your normal level of activity slowly. You may shower after 24 hours, but do not take a bath, swim or immerse yourself in water until the site has healed, and keep the dressing dry with plastic wrap while showering. The site may ooze for a couple of days. This drainage should lessen with each passing day. Call If:     You should call your Physician and/or the Radiology Nurse if you have any bleeding other than a small spot on your bandage. Call if you have any signs of infection, fever, or increased pain at the site of the tube. Call if the oozing increases, if it changes color, or begins to have an odor. Follow-Up Instructions: Please see your ordering doctor as he/she has requested. To Reach Us: If you have any questions about your procedure, please call the Interventional Radiology department at [phone].  After business hours (5pm) and weekends, call the answering service at [phone] and ask for the Radiologist on call to be paged. Interventional Radiology General Nurse Discharge    After general anesthesia or intravenous sedation, for 24 hours or while taking prescription Narcotics:  · Limit your activities  · Do not drive and operate hazardous machinery  · Do not make important personal or business decisions  · Do  not drink alcoholic beverages  · If you have not urinated within 8 hours after discharge, please contact your surgeon on call. * Please give a list of your current medications to your Primary Care Provider. * Please update this list whenever your medications are discontinued, doses are     changed, or new medications (including over-the-counter products) are added. * Please carry medication information at all times in case of emergency situations. These are general instructions for a healthy lifestyle:    No smoking/ No tobacco products/ Avoid exposure to second hand smoke  Surgeon General's Warning:  Quitting smoking now greatly reduces serious risk to your health. Obesity, smoking, and sedentary lifestyle greatly increases your risk for illness  A healthy diet, regular physical exercise & weight monitoring are important for maintaining a healthy lifestyle    You may be retaining fluid if you have a history of heart failure or if you experience any of the following symptoms:  Weight gain of 3 pounds or more overnight or 5 pounds in a week, increased swelling in our hands or feet or shortness of breath while lying flat in bed. Please call your doctor as soon as you notice any of these symptoms; do not wait until your next office visit.     Recognize signs and symptoms of STROKE:  F-face looks uneven    A-arms unable to move or move unevenly    S-speech slurred or non-existent    T-time-call 911 as soon as signs and symptoms begin-DO NOT go       Back to bed or wait to see if you get better-TIME IS BRAIN.       Patient Signature:  Date: 11/16/2017  Discharging Nurse: Carolynn German RN

## 2017-11-16 NOTE — PROGRESS NOTES
Discharge complete. Discharge instructions reviewed with pt. Time for questions allowed. Pt denies any questions at this time. Suture to left chest noted to be clean, dry, and intact. Pt assisted to front of hospital via wheelchair. F/u appointment to remove sutures given to pt's .      Visit Vitals    /58    Pulse 80    Temp 98.7 °F (37.1 °C)    Resp 16    Ht 5' 3.5\" (1.613 m)    Wt 86.2 kg (190 lb)    SpO2 92%    Breastfeeding No    BMI 33.13 kg/m2

## 2017-11-16 NOTE — PROGRESS NOTES
TRANSFER - IN REPORT:    Verbal report received from Elizabeth Villagomez, [address] [place] [address] (name) on Redgie Lites  being received from IR Recovery (unit) for routine progression of care      Report consisted of patients Situation, Background, Assessment and   Recommendations(SBAR). Information from the following report(s) Procedure Summary and MAR was reviewed with the receiving nurse. Opportunity for questions and clarification was provided. Assessment completed upon patients arrival to unit and care assumed.

## 2017-11-21 ENCOUNTER — HOSPITAL ENCOUNTER (OUTPATIENT)
Dept: LAB | Age: 62
Discharge: HOME | End: 2017-11-21
Payer: OTHER GOVERNMENT

## 2017-11-21 DIAGNOSIS — I48.0 PAROXYSMAL ATRIAL FIBRILLATION (HCC): ICD-10-CM

## 2017-11-21 LAB
ANION GAP SERPL CALC-SCNC: 6 MMOL/L
BUN SERPL-MCNC: 17 MG/DL (ref 8–23)
CALCIUM SERPL-MCNC: 9.3 MG/DL (ref 8.3–10.4)
CHLORIDE SERPL-SCNC: 104 MMOL/L (ref 98–107)
CO2 SERPL-SCNC: 31 MMOL/L (ref 21–32)
CREAT SERPL-MCNC: 0.8 MG/DL (ref 0.6–1)
GLUCOSE SERPL-MCNC: 140 MG/DL (ref 65–100)
MAGNESIUM SERPL-MCNC: 2.3 MG/DL (ref 1.8–2.4)
POTASSIUM SERPL-SCNC: 3.6 MMOL/L (ref 3.5–5.1)
SODIUM SERPL-SCNC: 141 MMOL/L (ref 136–145)

## 2017-11-21 PROCEDURE — 80048 BASIC METABOLIC PNL TOTAL CA: CPT | Performed by: INTERNAL MEDICINE

## 2017-11-21 PROCEDURE — 83735 ASSAY OF MAGNESIUM: CPT | Performed by: INTERNAL MEDICINE

## 2017-11-21 PROCEDURE — 36415 COLL VENOUS BLD VENIPUNCTURE: CPT | Performed by: INTERNAL MEDICINE

## 2017-11-30 ENCOUNTER — HOSPITAL ENCOUNTER (OUTPATIENT)
Dept: INTERVENTIONAL RADIOLOGY/VASCULAR | Age: 62
Discharge: HOME | End: 2017-11-30

## 2017-11-30 VITALS
RESPIRATION RATE: 16 BRPM | TEMPERATURE: 98.1 F | SYSTOLIC BLOOD PRESSURE: 127 MMHG | OXYGEN SATURATION: 99 % | DIASTOLIC BLOOD PRESSURE: 82 MMHG | HEART RATE: 82 BPM

## 2017-11-30 NOTE — PROGRESS NOTES
Follow up appoint for suture removal today tolerated well; 2 sutures cut and removed from patient's left upper chest incision. Incision well approximated in later stage of healing no s/s of infection noted.

## 2017-12-14 PROBLEM — I47.1 SVT (SUPRAVENTRICULAR TACHYCARDIA) (HCC): Status: ACTIVE | Noted: 2017-12-14

## 2018-01-04 ENCOUNTER — HOSPITAL ENCOUNTER (OUTPATIENT)
Dept: RADIATION ONCOLOGY | Age: 63
Discharge: HOME | End: 2018-01-04
Payer: OTHER GOVERNMENT

## 2018-01-04 VITALS
WEIGHT: 191 LBS | SYSTOLIC BLOOD PRESSURE: 185 MMHG | RESPIRATION RATE: 18 BRPM | HEART RATE: 78 BPM | BODY MASS INDEX: 33.3 KG/M2 | DIASTOLIC BLOOD PRESSURE: 97 MMHG

## 2018-01-04 DIAGNOSIS — C50.919 MALIGNANT NEOPLASM OF FEMALE BREAST, UNSPECIFIED ESTROGEN RECEPTOR STATUS, UNSPECIFIED LATERALITY, UNSPECIFIED SITE OF BREAST (HCC): Primary | ICD-10-CM

## 2018-01-04 PROCEDURE — 99211 OFF/OP EST MAY X REQ PHY/QHP: CPT

## 2018-01-04 NOTE — PROGRESS NOTES
Patient: Roberto Martinez MRN: [id number]  SSN: [id number]    YOB: 1955  Age: 65 y.o. Sex: female      Other Providers:  Osvaldo Aldridge MD    CHIEF COMPLAINT: Breast cancer    DIAGNOSIS: Right sided [id number] Stage IA invasive ductal carcinoma, two foci of disease, largest 0.7 cm.  92% ER, 7% LA, HER2 3+ positive    PREVIOUS TREATMENT:  1) 8/26/16:  Right breast lumpectomy   2) 9/23/16: Re-resection and SLN biopsy   3) Weekly paclitaxel + trastuzumab for 12 weeks, followed by Herceptin monotherapy to complete a year  4) Radiation of the right breast with 25 fractions of 5000 cGy, 5 boost of 1000 cGy. Treatment dates: 02/-1/17 through 03/14/17  5) AI, Arimidex: March 2017    HISTORY OF PRESENT ILLNESS:  Roberto Martinez is a 65 y.o. female initially seen by Dr. Lorrie Montes 01/12/17 at the request of Osvaldo Aldridge MD.  She had a screening mammogram in June 2016 that showed a developing group of calcifications in the upper outer right breast. She completed biopsy which showed DCIS. MRI was also completed showing a 9 mm enhancing mass. She was referred for surgical management and underwent lumpectomy on 8/26/16. Pathology review surprisingly showed two foci of invasive ductal carcinoma in a background of DCIS. The larger of the two foci was 0.7 cm, and receptors showed 92% ER and 7% LA, but also showed HER2 3+ positive. She went back for sentinel node biopsy and reexcision, both of which were negative for residual carcinoma. She was referred to medical oncology for adjuvant recommendations and saw Dr. Sherryle Oaks who felt she would benefit from APT regimen with weekly paclitaxel + trastuzumab for 12 weeks, followed by Herceptin monotherapy to complete a year. She will also be receiving endocrine therapy with AI. She was seen by Dr. Sherryle Oaks prior to her final chemotherapy and noted nausea and fatigue from chemo but was manageable. No fevers, chills or other infectious symptoms.  Neuropathy grade 1, stable and not interfering with ADLs. Pain in the tips of her nails, but not interfering with ADLs. With the completion of chemo, she was referred for adjuvant radiation recommendations. INTERVAL HISTORY:  Ms Dorothy Tilley returns today for follow up, 10 months after completing radiation therapy for her right breast invasive ductal carcinoma. She tolerated radiation well. She reports having very little \"pulling\" or discomfort of right breast tissue. She completed Herceptin in November 2017. She continues on Arimidex and is tolerating well. Her energy is good. She denies cough or dyspnea. OBSTETRIC HISTORY:    Menarche at the age of 14.    G3,P1. 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      PAST SURGICAL HISTORY:   Past Surgical History:   Procedure Laterality Date    HX BREAST BIOPSY Right 8/26/2016    RIGHT BREAST NEEDLE LOCALIZED BIOPSY performed by Silvino Lemos MD     HX BREAST LUMPECTOMY Right 8/26/2016    RIGHT BREAST LUMPECTOMY performed by Silvino Lemos MD     HX BREAST LUMPECTOMY Right 9/23/2016    RIGHT BREAST LUMPECTOMY/PARTIAL REVISION  performed by Silvino Lemos    HX COLONOSCOPY  2007    diverticulosis    HX ORTHOPAEDIC      left wrist ORIF    HX TUBAL LIGATION      HX VASCULAR ACCESS      NELA BIOPSY BREAST STEREOTACTIC Right 7.19.16       MEDICATIONS:     Current Outpatient Prescriptions:     carvedilol (COREG) 25 mg tablet, Take 1 Tab by mouth two (2) times daily (with meals). , Disp: 60 Tab, Rfl: 11    naproxen sodium (ALEVE) 220 mg cap, Take  by mouth., Disp: , Rfl:     exemestane (AROMASIN) 25 mg tablet, Take 1 Tab by mouth daily for 90 days. , Disp: 30 Tab, Rfl: 2    fluticasone (FLONASE) 50 mcg/actuation nasal spray, 2 Sprays by Both Nostrils route daily. , Disp: , Rfl:     magnesium 250 mg tab, Take  by mouth., Disp: , Rfl:     zolpidem (AMBIEN) 10 mg tablet, Take 1 Tab by mouth nightly as needed for Sleep. Max Daily Amount: 10 mg. Indications: SLEEP-ONSET INSOMNIA, Disp: 10 Tab, Rfl: 0    CALCIUM CARBONATE/VITAMIN D3 (CALTRATE 600 + D PO), Take 2 Tabs by mouth daily. , Disp: , Rfl:     sertraline (ZOLOFT) 100 mg tablet, TAKE 1 TABLET DAILY, Disp: 90 Tab, Rfl: 1    lisinopril (PRINIVIL, ZESTRIL) 40 mg tablet, Take 1 Tab by mouth daily. , Disp: 90 Tab, Rfl: 1    hydroCHLOROthiazide (HYDRODIURIL) 50 mg tablet, Take 1 Tab by mouth daily. Indications: hypertension, Disp: 90 Tab, Rfl: 1    atorvastatin (LIPITOR) 40 mg tablet, Take 1 Tab by mouth nightly., Disp: 90 Tab, Rfl: 1    biotin 10,000 mcg cap, Take 1 Cap by mouth daily. , Disp: , Rfl:     lidocaine-prilocaine (EMLA) topical cream, Apply  to affected area as needed for Pain. Apply to port site 45-60 minutes prior to lab appt or infusion. , Disp: 30 g, Rfl: 1    DIPHENHYDRAMINE HCL (BENADRYL PO), Take  by mouth as needed. , Disp: , Rfl:     LORazepam (ATIVAN) 0.5 mg tablet, Take 1 Tab by mouth daily as needed for Anxiety. (Patient taking differently: Take 0.5 mg by mouth as needed for Anxiety.), Disp: 30 Tab, Rfl: 1    aspirin (JESSICA CHEWABLE ASPIRIN) 81 mg chewable tablet, Take 81 mg by mouth daily. , Disp: , Rfl:     ALLERGIES:   Allergies   Allergen Reactions    Pcn [Penicillins] Hives       SOCIAL HISTORY:   Social History     Social History    Marital status:      Spouse name: N/A    Number of children: N/A    Years of education: N/A     Occupational History    Not on file. Social History Main Topics    Smoking status: Never Smoker    Smokeless tobacco: Never Used    Alcohol use No    Drug use: No    Sexual activity: Not on file     Other Topics Concern    Not on file     Social History Narrative    Lives at home with , Tiffanie Smith, and her mother. Homemaker. FAMILY HISTORY:   Family History   Problem Relation Age of Onset    Diabetes Mother    [facility] Alzheimer Mother     No Known Problems Father     Breast Cancer Maternal Aunt        REVIEW OF SYSTEMS: Please see the completed review of systems sheet in the chart that I have reviewed today. PHYSICAL EXAMINATION:   ECOG Performance status 0  VITAL SIGNS:   Visit Vitals    BP (!) 185/97    Pulse 78    Resp 18    Wt 191 lb (86.6 kg)    BMI 33.3 kg/m2        GENERAL: The patient is well-developed, ambulatory, alert and in no acute distress. HEENT: Head is normocephalic, atraumatic. CARDIOVASCULAR: Heart is regular rate and rhythm. RESPIRATORY: Lungs are clear to auscultation. There is normal respiratory effort. LYMPHATIC: There is no cervical, supraclavicular or axillary lymphadenopathy bilaterally. BREASTS: Examination of the unaffected breast reveals no dominant nodules or masses. The lumpectomy cavity appears well healed with good aesthetics and symmetry. Well healed surgical incision. Tanning of the skin. PATHOLOGY:    8/26/16:     DIAGNOSIS   RIGHT BREAST LOCALIZED WIDE EXCISION: TWO FOCI OF INFILTRATING DUCT CARCINOMA, LOW GRADE (WELL DIFFERENTIATED) IN A BACKGROUND OF MULTIFOCAL DUCTAL CARCINOMA IN-SITU, HIGH GRADE (CRIBRIFORM AND SOLID TYPE).  ONE FOCUS OF INFILTRATING CARCINOMA IS APPROXIMATELY 0.7 X 0.4 CM ON SLIDE AND IS APPROXIMATELY 0.5 CM FROM MARKED ANTERIOR MARGIN. OTHER MARGINS ARE GREATER THAN 1 CM FROM THIS INFILTRATING TUMOR. SECOND FOCUS OF INFILTRATING CARCINOMA IS APPROXIMATELY 0.1 X 0.1 CM AND IS APPROXIMATELY 0.2 CM FROM MARKED POSTERIOR MARGIN. DUCTAL CARCINOMA IN-SITU IS PRESENT LESS THAN 0.1 CM FROM MARKED ANTERIOR MARGIN AND APPROXIMATELY 0.1 CM FROM MARKED POSTERIOR AND MEDIAL MARGINS. OTHER MARGINS ARE GREATER THAN 1 CM FROM TUMOR (SEE [id number]). Interpretation   Clarient Diagnostic Result:   TEST NAME: RESULTS: INTERNAL CONTROLS:   ESTROGEN RECEPTOR: Positive (92%) Present and ER positive   PROGESTERONE RECEPTOR: Positive (7%) Present and IN positive   HER-2/JAGDISH: Positive (3+) Percent of cells with 3+ stain: 38%      ANATOMIC SITE: Right breast (presumably 10 o'clock position based on prior core biopsy). PROCEDURE: Needle localized wide excision. HISTOLOGIC TYPE: Infiltrating duct carcinoma. SIZE: Two foci, one approximately 0.7 x 0.4 cm and an apparent separate focus approximately 0.1 x 0.1 cm. UNIFOCAL OR MULTIFOCAL: Apparently multifocal.   PRESENCE OF SKIN, NIPPLE OR SKELETAL MUSCLE INVOLVEMENT: Not identified. ZARA MODIFICATION OF BLOOM-KAPADIA GRADE:   ARCHITECTURAL SCORE: 2/3. NUCLEAR SCORE: 2/3. MITOTIC SCORE: 1/3. TOTAL SCORE: 5/9 = Low Grade for both tumors. IN-SITU COMPONENT: Multifocal ductal carcinoma in-situ is noted, high grade (cribriform and solid type). LYMPHOVASCULAR INVASION: Not identified. ARE MICROCALCIFICATIONS IDENTIFIED: Within ductal carcinoma in-situ. TUMOR DISTANCE TO CLOSEST MARGIN: Ductal carcinoma in-situ is present less than 0.1 cm from marked anterior margin and approximately 0.1 cm from marked medial margin. Small focus of infiltrating carcinoma is present approximately 0.2 cm from marked deep margin. ANTERIOR (SUPERFICIAL): Ductal carcinoma in-situ is present less than 0.1 cm from marked anterior margin and larger focus of infiltrating carcinoma is present approximately 0.5 cm from marked anterior margin. POSTERIOR (DEEP):  Focus of ductal carcinoma in-situ is present approximately 0.1 cm from marked posterior margin and small focus of infiltrating carcinoma is present approximately 0.2 cm from marked posterior margin. MEDIAL: Focus of ductal carcinoma in-situ is present approximately 0.1 cm from marked medial margin. LATERAL: Greater than 1 cm. INFERIOR: Greater than 1 cm. SUPERIOR: Greater than 1 cm. LYMPH NODE STATUS: Does not apply. TOTAL NUMBER OF LYMPH NODES CONTAINING CARCINOMA: Does not apply. TOTAL NUMBER OF LYMPH NODES EXAMINED: Does not apply. METASTASIS: Does not apply. SIZE OF LARGEST POSITIVE NODE: Does not apply. EXTRA-CAPSULAR EXTENSION OF TUMOR: Does not apply. OTHER FINDINGS: Changes consistent with prior biopsy site. TNM CLASSIFICATION: pT1 pNX.     9/23/16:   DIAGNOSIS   A: POST LUMPECTOMY MARGIN RIGHT BREAST:   BENIGN BREAST TISSUE WITH FIBROCYSTIC MASTOPATHY AND FAT NECROSIS. NO RESIDUAL CARCINOMA IS IDENTIFIED. B: SENTINEL NODE RIGHT BREAST:   ONE LYMPH NODE NEGATIVE FOR METASTATIC     LABORATORY: none today    RADIOLOGY:    RIGHT DIAGNOSTIC DIGITAL MAMMOGRAPHY:  11/02/2017     CLINICAL HISTORY: Follow-up right lumpectomy in August 2016 with subsequent  chemoradiation completed in March 2017.     COMPARISON: Bilateral mammography of May 8, 2017 and multiple prior studies  including preoperative MRI of July 29, 2016.     FINDINGS: Craniocaudal, mediolateral oblique, straight lateral, and spot  compression views demonstrate interval decrease in ill-defined soft tissue  density in the right lumpectomy bed since May. There is persistent  postoperative architectural distortion. Scattered fibroglandular tissue is seen  elsewhere in the breast with no new or enlarging soft tissue mass, suspicious  microcalcifications, or other definite evidence for malignancy.     IMPRESSION:     1. EXPECTED APPEARANCE OF THE LUMPECTOMY BED WITH NO DEFINITE EVIDENCE FOR  MALIGNANCY.   FOLLOW UP BILATERAL SCREENING MAMMOGRAPHY IS RECOMMENDED IN MAY  2018, UNLESS FURTHER EVALUATION IS CLINICALLY INDICATED SOONER.     2. FOLLOW-UP BREAST MRI SHOULD BE CONSIDERED IN SEPTEMBER 2018 (18 MONTHS AFTER  COMPLETION OF RADIATION THERAPY) IN ORDER TO ESTABLISH THE NEW POSTTREATMENT  BASELINE.     BI-RADS Assessment Category 2: Benign finding.     BD2      IMPRESSION:  Shaji Hill is a 65 y.o. female with right sided [id number] Stage IA invasive ductal carcinoma, two foci of disease, largest 0.7 cm, 92% ER, 7% OK, HER2 3+ positive S/P right breast lumpectomy, 08/26/16 F/B re-resection and SLN biopsy, 09/23/16. She completed weekly paclitaxel + trastuzumab for 12 weeks, and 1 year of Herceptin in November 2017. She completed radiation therapy 03/14/17. Ms Stephani Hillman is recovering as anticipated from her previous radiation therapy. Recent mammogram, 11/2/2017 was negative for malignancy - recommending bilateral mammogram in May and follow up breast MRI in September post radiation therapy. Dexa scan completed 04/6/17 shows osteopenia. PLAN:   1) schedule bilateral diagnostic mammogram May 2018 - schedule breast MRI next visit  2) continue lotions to radiation field  3) continue SBE  4) continue calcium and vitamin D - repeat dexa 04/19  5) endocrine therapy under the direction of medical oncology, Dr. Ernst Angel  6) Follow up 6 months      Tacos Zaman NP   January 4, 2018       Portions of this note were copied from prior encounters and reviewed for accuracy, currency, and represent documentation and tasks completed during this encounter. I verify and attest these portions to be unchanged from prior visits.

## 2018-01-04 NOTE — PROGRESS NOTES
Pt is here for FUP post RT to the left breast for DCIS which ended 12/2016. Pt has no c/o today. She will have a mammogram in 5/2018 and return for FUP. Pt is taking Aromasin as ordered.

## 2018-01-26 ENCOUNTER — HOSPITAL ENCOUNTER (OUTPATIENT)
Dept: GENERAL RADIOLOGY | Age: 63
Discharge: HOME | End: 2018-01-26
Payer: OTHER GOVERNMENT

## 2018-01-26 DIAGNOSIS — M25.552 HIP PAIN, ACUTE, LEFT: ICD-10-CM
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
## 2018-01-26 DIAGNOSIS — M85.89 OSTEOPENIA OF MULTIPLE SITES: ICD-10-CM

## 2018-01-26 PROCEDURE — 73502 X-RAY EXAM HIP UNI 2-3 VIEWS: CPT

## 2018-02-13 ENCOUNTER — HOSPITAL ENCOUNTER (OUTPATIENT)
Dept: LAB | Age: 63
Discharge: HOME | End: 2018-02-13
Payer: OTHER GOVERNMENT

## 2018-02-13 DIAGNOSIS — D05.11 DUCTAL CARCINOMA IN SITU (DCIS) OF RIGHT BREAST: ICD-10-CM

## 2018-02-13 LAB
ALBUMIN SERPL-MCNC: 3.9 G/DL (ref 3.2–4.6)
ALBUMIN/GLOB SERPL: 0.9 {RATIO} (ref 1.2–3.5)
ALP SERPL-CCNC: 79 U/L (ref 50–136)
ALT SERPL-CCNC: 16 U/L (ref 12–65)
ANION GAP SERPL CALC-SCNC: 5 MMOL/L (ref 7–16)
AST SERPL-CCNC: 11 U/L (ref 15–37)
BASOPHILS # BLD: 0 K/UL (ref 0–0.2)
BASOPHILS NFR BLD: 1 % (ref 0–2)
BILIRUB SERPL-MCNC: 0.3 MG/DL (ref 0.2–1.1)
BUN SERPL-MCNC: 22 MG/DL (ref 8–23)
CALCIUM SERPL-MCNC: 10 MG/DL (ref 8.3–10.4)
CHLORIDE SERPL-SCNC: 111 MMOL/L (ref 98–107)
CO2 SERPL-SCNC: 28 MMOL/L (ref 21–32)
CREAT SERPL-MCNC: 1.11 MG/DL (ref 0.6–1)
DIFFERENTIAL METHOD BLD: ABNORMAL
EOSINOPHIL # BLD: 0.1 K/UL (ref 0–0.8)
EOSINOPHIL NFR BLD: 2 % (ref 0.5–7.8)
ERYTHROCYTE [DISTWIDTH] IN BLOOD BY AUTOMATED COUNT: 13.9 % (ref 11.9–14.6)
GLOBULIN SER CALC-MCNC: 4.3 G/DL (ref 2.3–3.5)
GLUCOSE SERPL-MCNC: 102 MG/DL (ref 65–100)
HCT VFR BLD AUTO: 33.4 % (ref 35.8–46.3)
HGB BLD-MCNC: 11 G/DL (ref 11.7–15.4)
LYMPHOCYTES # BLD: 1.1 K/UL (ref 0.5–4.6)
LYMPHOCYTES NFR BLD: 28 % (ref 13–44)
MCH RBC QN AUTO: 30 PG (ref 26.1–32.9)
MCHC RBC AUTO-ENTMCNC: 32.9 G/DL (ref 31.4–35)
MCV RBC AUTO: 91 FL (ref 79.6–97.8)
MONOCYTES # BLD: 0.5 K/UL (ref 0.1–1.3)
MONOCYTES NFR BLD: 13 % (ref 4–12)
NEUTS SEG # BLD: 2.2 K/UL (ref 1.7–8.2)
NEUTS SEG NFR BLD: 56 % (ref 43–78)
NRBC # BLD: 0 K/UL (ref 0–0.2)
PLATELET # BLD AUTO: 275 K/UL (ref 150–450)
PMV BLD AUTO: 9 FL (ref 10.8–14.1)
POTASSIUM SERPL-SCNC: 4 MMOL/L (ref 3.5–5.1)
PROT SERPL-MCNC: 8.2 G/DL (ref 6.3–8.2)
RBC # BLD AUTO: 3.67 M/UL (ref 4.05–5.25)
SODIUM SERPL-SCNC: 144 MMOL/L (ref 136–145)
WBC # BLD AUTO: 4 K/UL (ref 4.3–11.1)

## 2018-02-13 PROCEDURE — 80053 COMPREHEN METABOLIC PANEL: CPT | Performed by: INTERNAL MEDICINE

## 2018-02-13 PROCEDURE — 36415 COLL VENOUS BLD VENIPUNCTURE: CPT | Performed by: INTERNAL MEDICINE

## 2018-02-13 PROCEDURE — 85025 COMPLETE CBC W/AUTO DIFF WBC: CPT | Performed by: INTERNAL MEDICINE

## 2018-02-27 NOTE — IP AVS SNAPSHOT
Manish Luciana 
 
 
 [address] 
[phone] Patient: Leatha Davidson MRN: [id number] :1955 About your hospitalization You were admitted on:  2017 You last received care in the:  [facility] You were discharged on:  2017 Why you were hospitalized Your primary diagnosis was:  Not on File Things You Need To Do (next 8 weeks)  SHORT with Rocio Lester MD at  4:20 PM  
Where:  [address])  IR FOLLOW UP with [facility] IR HOLDING RM at 12:00 PM  
Follow instructions as given by Radiologist or IR Staff. IR scheduling can be reached at [phone]. Where:  [facility] ([address] [place] [address])  [address] OFFICE VISIT with Katherine Thomas NP at 10:00 AM  
Where:  Robin Gudino ([facility])  ECHOCARDIOGRAM with LAYNE ECHO 26 at  8:30 AM  
Where:  [facility] ([address] [place] [address]) Discharge Orders None A check estephanie indicates which time of day the medication should be taken. My Medications ASK your physician about these medications Instructions Each Dose to Equal  
 Morning Noon Evening Bedtime ALEVE 220 mg Cap Generic drug:  naproxen sodium Your last dose was: Your next dose is: Take  by mouth. amLODIPine 10 mg tablet Commonly known as:  Love Breach Your last dose was: Your next dose is: Take 1 Tab by mouth daily. 10 mg  
    
   
   
   
  
 atorvastatin 40 mg tablet Commonly known as:  LIPITOR Your last dose was: Your next dose is: Take 1 Tab by mouth nightly.   
 40 mg  
    
   
   
   
  
 JESSICA CHEWABLE ASPIRIN 81 mg chewable tablet Generic drug:  aspirin Your last dose was: Your next dose is: Take 81 mg by mouth daily. 81 mg  
    
   
   
   
  
 BENADRYL PO Your last dose was: Your next dose is: Take  by mouth as needed. biotin 10,000 mcg Cap Your last dose was: Your next dose is: Take 1 Cap by mouth daily. 1 Cap CALTRATE 600 + D PO Your last dose was: Your next dose is: Take 2 Tabs by mouth daily. 2 Tab  
    
   
   
   
  
 exemestane 25 mg tablet Commonly known as:  Aubery Sane Your last dose was: Your next dose is: Take 1 Tab by mouth daily for 90 days. 25 mg  
    
   
   
   
  
 FLONASE 50 mcg/actuation nasal spray Generic drug:  fluticasone Your last dose was: Your next dose is: 2 Sprays by Both Nostrils route daily. 2 Spray  
    
   
   
   
  
 hydroCHLOROthiazide 50 mg tablet Commonly known as:  HYDRODIURIL Your last dose was: Your next dose is: Take 1 Tab by mouth daily. Indications: hypertension 50 mg  
    
   
   
   
  
 IMODIUM PO Your last dose was: Your next dose is: Take  by mouth as needed. lidocaine-prilocaine topical cream  
Commonly known as:  EMLA Your last dose was: Your next dose is:    
   
   
 Apply  to affected area as needed for Pain. Apply to port site 45-60 minutes prior to lab appt or infusion. lisinopril 40 mg tablet Commonly known as:  Therbrianda Miller Your last dose was: Your next dose is: Take 1 Tab by mouth daily. 40 mg LORazepam 0.5 mg tablet Commonly known as:  ATIVAN Your last dose was: Your next dose is: Take 1 Tab by mouth daily as needed for Anxiety. 0.5 mg  
    
   
   
   
  
 magnesium 250 mg Tab Your last dose was: Your next dose is: Take  by mouth. ondansetron hcl 8 mg tablet Commonly known as:  ZOFRAN (AS HYDROCHLORIDE) Your last dose was: Your next dose is: Take 1 Tab by mouth every eight (8) hours as needed for Nausea. 8 mg  
    
   
   
   
  
 sertraline 100 mg tablet Commonly known as:  ZOLOFT Your last dose was: Your next dose is: TAKE 1 TABLET DAILY  
     
   
   
   
  
 zolpidem 10 mg tablet Commonly known as:  AMBIEN Your last dose was: Your next dose is: Take 1 Tab by mouth nightly as needed for Sleep. Max Daily Amount: 10 mg. Indications: SLEEP-ONSET INSOMNIA 10 mg Discharge Instructions [facility] [address] Department of Interventional Radiology [facility] 
[phone] Office 
[phone] Fax DRAIN/PORT/CATHETER REMOVAL DISCHARGE INSTRUCTIONS General Information: 
   Your doctor has ordered for us to remove your drain, port, or catheter. This could be that you do not need it anymore, it is not doing its job, your physician has decided on another plan for your treatment and/or it may need replacing. Home Care Instructions: You can resume your regular diet and medication regimen. Do not drink alcohol, drive, or make any important legal decisions in the next 24 hours. Do not lift anything heavier than a gallon of milk, or do anything strenuous for the next 24 hours. You will notice a dressing over the site of the removal. This dressing should stay in place until the site is healed. The dressing should be changed at least every 48 hours. You should change the dressing sooner if it becomes soiled or wet.  The nurse who discharges you to home should review with you any wound care instructions. Resume your normal level of activity slowly. You may shower after 24 hours, but do not take a bath, swim or immerse yourself in water until the site has healed, and keep the dressing dry with plastic wrap while showering. The site may ooze for a couple of days. This drainage should lessen with each passing day. Call If: 
   You should call your Physician and/or the Radiology Nurse if you have any bleeding other than a small spot on your bandage. Call if you have any signs of infection, fever, or increased pain at the site of the tube. Call if the oozing increases, if it changes color, or begins to have an odor. Follow-Up Instructions: Please see your ordering doctor as he/she has requested. To Reach Us: If you have any questions about your procedure, please call the Interventional Radiology department at [phone]. After business hours (5pm) and weekends, call the answering service at [phone] and ask for the Radiologist on call to be paged. Interventional Radiology General Nurse Discharge After general anesthesia or intravenous sedation, for 24 hours or while taking prescription Narcotics: · Limit your activities · Do not drive and operate hazardous machinery · Do not make important personal or business decisions · Do  not drink alcoholic beverages · If you have not urinated within 8 hours after discharge, please contact your surgeon on call. * Please give a list of your current medications to your Primary Care Provider. * Please update this list whenever your medications are discontinued, doses are 
   changed, or new medications (including over-the-counter products) are added. * Please carry medication information at all times in case of emergency situations. These are general instructions for a healthy lifestyle: No smoking/ No tobacco products/ Avoid exposure to second hand smoke Surgeon General's Warning:  Quitting smoking now greatly reduces serious risk to your health. Obesity, smoking, and sedentary lifestyle greatly increases your risk for illness A healthy diet, regular physical exercise & weight monitoring are important for maintaining a healthy lifestyle You may be retaining fluid if you have a history of heart failure or if you experience any of the following symptoms:  Weight gain of 3 pounds or more overnight or 5 pounds in a week, increased swelling in our hands or feet or shortness of breath while lying flat in bed. Please call your doctor as soon as you notice any of these symptoms; do not wait until your next office visit. Recognize signs and symptoms of STROKE: 
F-face looks uneven A-arms unable to move or move unevenly S-speech slurred or non-existent T-time-call 911 as soon as signs and symptoms begin-DO NOT go Back to bed or wait to see if you get better-TIME IS BRAIN. Patient Signature: 
Date: 11/16/2017 Discharging Nurse: Amilcar Patel RN Introducing [facility]! Dear Ezekiel Linda: Thank you for requesting a World[employer] account. Our records indicate that you already have an active WorldAPP account. You can access your account anytime at [email or web address] Did you know that you can access your hospital and ER discharge instructions at any time in WorldAPP? You can also review all of your test results from your hospital stay or ER visit. Additional Information If you have questions, please visit the Frequently Asked Questions section of the WorldAPP website at [email or web address] Remember, WorldAPP is NOT to be used for urgent needs. For medical emergencies, dial 911. Now available from your iPhone and Android! Unresulted Labs-Please follow up with your PCP about these lab tests Order Current Status IR REMOVE TUNL CVAD W PORT/PUMP In process Providers Seen During Your Hospitalization Provider Specialty Primary office phone Zeeshan Horton MD Hematology and Oncology [phone] Your Primary Care Physician (PCP) Primary Care Physician Office Phone Office Fax [phone] [facility] [place] [phone] You are allergic to the following Allergen Reactions Pcn (Penicillins) Hives Recent Documentation Height Weight Breastfeeding? BMI OB Status Smoking Status 1.613 m 86.2 kg No 33.13 kg/m2 Postmenopausal Never Smoker Emergency Contacts Name Discharge Info Relation Home Work Mobile [address] CAREGIVER [3] Spouse [3] [phone] Patient Belongings The following personal items are in your possession at time of discharge: 
     Visual Aid: None Please provide this summary of care documentation to your next provider. Signatures-by signing, you are acknowledging that this After Visit Summary has been reviewed with you and you have received a copy. Patient Signature:  ____________________________________________________________ Date:  ____________________________________________________________  
  
[facility] Provider Signature:  ____________________________________________________________ Date:  ____________________________________________________________ RR

## 2018-05-16 ENCOUNTER — HOSPITAL ENCOUNTER (OUTPATIENT)
Dept: MAMMOGRAPHY | Age: 63
Discharge: HOME | End: 2018-05-16
Attending: RADIOLOGY
Payer: OTHER GOVERNMENT

## 2018-05-16 DIAGNOSIS — C50.919 MALIGNANT NEOPLASM OF FEMALE BREAST, UNSPECIFIED ESTROGEN RECEPTOR STATUS, UNSPECIFIED LATERALITY, UNSPECIFIED SITE OF BREAST (HCC): ICD-10-CM

## 2018-05-16 PROCEDURE — 77066 DX MAMMO INCL CAD BI: CPT

## 2018-05-24 ENCOUNTER — HOSPITAL ENCOUNTER (OUTPATIENT)
Dept: RADIATION ONCOLOGY | Age: 63
Discharge: HOME | End: 2018-05-24
Payer: OTHER GOVERNMENT

## 2018-05-24 VITALS
RESPIRATION RATE: 18 BRPM | HEART RATE: 95 BPM | SYSTOLIC BLOOD PRESSURE: 177 MMHG | BODY MASS INDEX: 32.81 KG/M2 | WEIGHT: 192.2 LBS | DIASTOLIC BLOOD PRESSURE: 107 MMHG | TEMPERATURE: 98.1 F | HEIGHT: 64 IN | OXYGEN SATURATION: 98 %

## 2018-05-24 DIAGNOSIS — C50.919 MALIGNANT NEOPLASM OF FEMALE BREAST, UNSPECIFIED ESTROGEN RECEPTOR STATUS, UNSPECIFIED LATERALITY, UNSPECIFIED SITE OF BREAST (HCC): Primary | ICD-10-CM

## 2018-05-24 PROCEDURE — 99211 OFF/OP EST MAY X REQ PHY/QHP: CPT

## 2018-05-24 NOTE — PROGRESS NOTES
Pt here today for FUP post RT to the right breast for DCIS which ended 3/14/17. Her 5/16/18 Mammogram was negative. Pt will have the recommended 18 mo. breast MRI 9/2018. Pt stated she has decreased breast tenderness. Pt is taking Arimidex as ordered. She will return in 6 months for FUP.

## 2018-05-24 NOTE — PROGRESS NOTES
Patient: Bahman Lombardo MRN: [id number]  SSN: [id number]    YOB: 1955  Age: 65 y.o. Sex: female      Other Providers:  Jessica Rice MD    CHIEF COMPLAINT: Breast cancer    DIAGNOSIS: Right sided [id number] Stage IA invasive ductal carcinoma, two foci of disease, largest 0.7 cm.  92% ER, 7% FL, HER2 3+ positive    PREVIOUS TREATMENT:  1) 8/26/16:  Right breast lumpectomy   2) 9/23/16: Re-resection and SLN biopsy   3) Weekly paclitaxel + trastuzumab for 12 weeks, followed by Herceptin monotherapy to complete a year  4) Radiation of the right breast with 25 fractions of 5000 cGy, 5 boost of 1000 cGy. Treatment dates: 02/-1/17 through 03/14/17  5) AI, Arimidex: March 2017    HISTORY OF PRESENT ILLNESS:  Bahman Lombardo is a 65 y.o. female initially seen by Dr. Elmer Guzman 01/12/17 at the request of Jessica Rice MD.  She had a screening mammogram in June 2016 that showed a developing group of calcifications in the upper outer right breast. She completed biopsy which showed DCIS. MRI was also completed showing a 9 mm enhancing mass. She was referred for surgical management and underwent lumpectomy on 8/26/16. Pathology review surprisingly showed two foci of invasive ductal carcinoma in a background of DCIS. The larger of the two foci was 0.7 cm, and receptors showed 92% ER and 7% FL, but also showed HER2 3+ positive. She went back for sentinel node biopsy and reexcision, both of which were negative for residual carcinoma. She was referred to medical oncology for adjuvant recommendations and saw Dr. Ty Willoughby who felt she would benefit from APT regimen with weekly paclitaxel + trastuzumab for 12 weeks, followed by Herceptin monotherapy to complete a year. She will also be receiving endocrine therapy with AI. She was seen by Dr. Ty Wliloughby prior to her final chemotherapy and noted nausea and fatigue from chemo but was manageable. No fevers, chills or other infectious symptoms.  Neuropathy grade 1, stable and not interfering with ADLs. Pain in the tips of her nails, but not interfering with ADLs. With the completion of chemo, she was referred for adjuvant radiation recommendations. INTERVAL HISTORY:  Ms Kenan Ulloa returns today for follow up, 14 months after completing radiation therapy for her right breast invasive ductal carcinoma. She tolerated radiation well. She continues with occasional  \"pulling\" or discomfort of right breast. She completed Herceptin in November 2017. She continues on Arimidex and is tolerating well. Her energy is good. She denies cough or dyspnea. OBSTETRIC HISTORY:    Menarche at the age of 14.    G3,P1. 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      PAST SURGICAL HISTORY:   Past Surgical History:   Procedure Laterality Date    HX BREAST BIOPSY Right 8/26/2016    RIGHT BREAST NEEDLE LOCALIZED BIOPSY performed by Nina Davis MD     HX BREAST LUMPECTOMY Right 8/26/2016    RIGHT BREAST LUMPECTOMY performed by Nina Davis MD     HX BREAST LUMPECTOMY Right 9/23/2016    RIGHT BREAST LUMPECTOMY/PARTIAL REVISION  performed by Nina Davis    HX COLONOSCOPY  2007    diverticulosis    HX ORTHOPAEDIC      left wrist ORIF    HX TUBAL LIGATION      HX VASCULAR ACCESS      NELA BIOPSY BREAST STEREOTACTIC Right 7.19.16       MEDICATIONS: Current Outpatient Prescriptions:     letrozole (FEMARA) 2.5 mg tablet, TAKE ONE TABLET BY MOUTH EVERY DAY, Disp: 30 Tab, Rfl: 6    FOLIC ACID/MULTIVIT-MIN/LUTEIN (CENTRUM SILVER PO), Take  by mouth., Disp: , Rfl:     zolpidem (AMBIEN) 10 mg tablet, Take 1 Tab by mouth nightly as needed for Sleep. Max Daily Amount: 10 mg., Disp: 30 Tab, Rfl: 0    acetaminophen (TYLENOL ARTHRITIS PAIN) 650 mg TbER, Take 650 mg by mouth every eight (8) hours. , Disp: , Rfl:     triamterene-hydroCHLOROthiazide (MAXZIDE) 37.5-25 mg per tablet, Take 1 Tab by mouth daily. , Disp: 90 Tab, Rfl: 3    LORazepam (ATIVAN) 0.5 mg tablet, Take 1 Tab by mouth daily as needed for Anxiety. Indications: anxiety, Disp: 30 Tab, Rfl: 1    sertraline (ZOLOFT) 100 mg tablet, Take 1.5 Tabs by mouth daily. , Disp: 135 Tab, Rfl: 1    lisinopril (PRINIVIL, ZESTRIL) 40 mg tablet, Take 1 Tab by mouth daily. , Disp: 90 Tab, Rfl: 1    atorvastatin (LIPITOR) 40 mg tablet, Take 1 Tab by mouth nightly., Disp: 90 Tab, Rfl: 1    carvedilol (COREG) 25 mg tablet, Take 1 Tab by mouth two (2) times daily (with meals). , Disp: 60 Tab, Rfl: 11    fluticasone (FLONASE) 50 mcg/actuation nasal spray, 2 Sprays by Both Nostrils route as needed. , Disp: , Rfl:     magnesium 250 mg tab, Take  by mouth., Disp: , Rfl:     CALCIUM CARBONATE/VITAMIN D3 (CALTRATE 600 + D PO), Take 2 Tabs by mouth daily. , Disp: , Rfl:     biotin 10,000 mcg cap, Take 1 Cap by mouth daily. , Disp: , Rfl:     DIPHENHYDRAMINE HCL (BENADRYL PO), Take  by mouth as needed. , Disp: , Rfl:     aspirin (JESSICA CHEWABLE ASPIRIN) 81 mg chewable tablet, Take 81 mg by mouth daily. , Disp: , Rfl:     ALLERGIES:   Allergies   Allergen Reactions    Pcn [Penicillins] Hives       SOCIAL HISTORY:   Social History     Social History    Marital status:      Spouse name: N/A    Number of children: N/A    Years of education: N/A     Occupational History    Not on file.      Social History Main Topics    Smoking status: Never Smoker    Smokeless tobacco: Never Used    Alcohol use No    Drug use: No    Sexual activity: Not on file     Other Topics Concern    Not on file     Social History Narrative    Lives at home with , Liza Baca, and her mother. Homemaker. FAMILY HISTORY:   Family History   Problem Relation Age of Onset    Diabetes Mother    [facility] Alzheimer Mother     No Known Problems Father     Breast Cancer Maternal Aunt        REVIEW OF SYSTEMS: Please see the completed review of systems sheet in the chart that I have reviewed today. PHYSICAL EXAMINATION:   ECOG Performance status 0  VITAL SIGNS: Blood pressure 177/107   Pulse 95  Temperature 98.1  Weight 192.2     GENERAL: The patient is well-developed, ambulatory, alert and in no acute distress. HEENT: Head is normocephalic, atraumatic. CARDIOVASCULAR: Heart is regular rate and rhythm. RESPIRATORY: Lungs are clear to auscultation. There is normal respiratory effort. LYMPHATIC: There is no cervical, supraclavicular or axillary lymphadenopathy bilaterally. BREASTS: Examination of the unaffected breast reveals no dominant nodules or masses. The lumpectomy cavity appears well healed with good aesthetics and symmetry. Well healed surgical incision. PATHOLOGY:    8/26/16:     DIAGNOSIS   RIGHT BREAST LOCALIZED WIDE EXCISION: TWO FOCI OF INFILTRATING DUCT CARCINOMA, LOW GRADE (WELL DIFFERENTIATED) IN A BACKGROUND OF MULTIFOCAL DUCTAL CARCINOMA IN-SITU, HIGH GRADE (CRIBRIFORM AND SOLID TYPE). ONE FOCUS OF INFILTRATING CARCINOMA IS APPROXIMATELY 0.7 X 0.4 CM ON SLIDE AND IS APPROXIMATELY 0.5 CM FROM MARKED ANTERIOR MARGIN. OTHER MARGINS ARE GREATER THAN 1 CM FROM THIS INFILTRATING TUMOR. SECOND FOCUS OF INFILTRATING CARCINOMA IS APPROXIMATELY 0.1 X 0.1 CM AND IS APPROXIMATELY 0.2 CM FROM MARKED POSTERIOR MARGIN.  DUCTAL CARCINOMA IN-SITU IS PRESENT LESS THAN 0.1 CM FROM MARKED ANTERIOR MARGIN AND APPROXIMATELY 0.1 CM FROM MARKED POSTERIOR AND MEDIAL MARGINS. OTHER MARGINS ARE GREATER THAN 1 CM FROM TUMOR (SEE [id number]). Interpretation   Clarient Diagnostic Result:   TEST NAME: RESULTS: INTERNAL CONTROLS:   ESTROGEN RECEPTOR: Positive (92%) Present and ER positive   PROGESTERONE RECEPTOR: Positive (7%) Present and WA positive   HER-2/JAGDISH: Positive (3+) Percent of cells with 3+ stain: 38%      ANATOMIC SITE: Right breast (presumably 10 o'clock position based on prior core biopsy). PROCEDURE: Needle localized wide excision. HISTOLOGIC TYPE: Infiltrating duct carcinoma. SIZE: Two foci, one approximately 0.7 x 0.4 cm and an apparent separate focus approximately 0.1 x 0.1 cm. UNIFOCAL OR MULTIFOCAL: Apparently multifocal.   PRESENCE OF SKIN, NIPPLE OR SKELETAL MUSCLE INVOLVEMENT: Not identified. ZARA MODIFICATION OF BLOOM-KAPADIA GRADE:   ARCHITECTURAL SCORE: 2/3. NUCLEAR SCORE: 2/3. MITOTIC SCORE: 1/3. TOTAL SCORE: 5/9 = Low Grade for both tumors. IN-SITU COMPONENT: Multifocal ductal carcinoma in-situ is noted, high grade (cribriform and solid type). LYMPHOVASCULAR INVASION: Not identified. ARE MICROCALCIFICATIONS IDENTIFIED: Within ductal carcinoma in-situ. TUMOR DISTANCE TO CLOSEST MARGIN: Ductal carcinoma in-situ is present less than 0.1 cm from marked anterior margin and approximately 0.1 cm from marked medial margin. Small focus of infiltrating carcinoma is present approximately 0.2 cm from marked deep margin. ANTERIOR (SUPERFICIAL): Ductal carcinoma in-situ is present less than 0.1 cm from marked anterior margin and larger focus of infiltrating carcinoma is present approximately 0.5 cm from marked anterior margin. POSTERIOR (DEEP): Focus of ductal carcinoma in-situ is present approximately 0.1 cm from marked posterior margin and small focus of infiltrating carcinoma is present approximately 0.2 cm from marked posterior margin.    MEDIAL: Focus of ductal carcinoma in-situ is present approximately 0.1 cm from marked medial margin. LATERAL: Greater than 1 cm. INFERIOR: Greater than 1 cm. SUPERIOR: Greater than 1 cm. LYMPH NODE STATUS: Does not apply. TOTAL NUMBER OF LYMPH NODES CONTAINING CARCINOMA: Does not apply. TOTAL NUMBER OF LYMPH NODES EXAMINED: Does not apply. METASTASIS: Does not apply. SIZE OF LARGEST POSITIVE NODE: Does not apply. EXTRA-CAPSULAR EXTENSION OF TUMOR: Does not apply. OTHER FINDINGS: Changes consistent with prior biopsy site. TNM CLASSIFICATION: pT1 pNX.     9/23/16:   DIAGNOSIS   A: POST LUMPECTOMY MARGIN RIGHT BREAST:   BENIGN BREAST TISSUE WITH FIBROCYSTIC MASTOPATHY AND FAT NECROSIS. NO RESIDUAL CARCINOMA IS IDENTIFIED. B: SENTINEL NODE RIGHT BREAST:   ONE LYMPH NODE NEGATIVE FOR METASTATIC     LABORATORY: none today    RADIOLOGY:    BILATERAL DIAGNOSTIC MAMMOGRAM, 5/16/2018     CLINICAL HISTORY: History of right breast cancer status post lumpectomy. No  current breast complaints.     Comparison studies:  Bilateral diagnostic mammogram 5/8/2017, bilateral  screening mammogram 6/24/2016, and right breast diagnostic mammogram 11/2/2017.     FINDINGS:  BILATERAL DIAGNOSTIC MAMMOGRAM:  Bilateral digital mammography was performed, and interpreted in conjunction with  CAD overread. The breasts are composed of scattered fibroglandular elements. The axilla contain benign appearing lymph nodes and benign and dermal  calcifications. Grossly stable right breast skin thickening is seen. No evolving  nipple retraction is seen. Bilateral breast calcifications are seen which are  not felt to be significantly changed. No evolving unique clustered  microcalcifications are seen to suggest a worrisome process. No evolving  worrisome dominant mass, spiculated density, or architectural distortion is  seen. Outer right breast asymmetry and architectural changes from prior  lumpectomy are once again seen.  These are felt to be decreased in size from the  most recent diagnostic mammogram which is consistent with expected postsurgical  evolution. No evolving worrisome features are seen.     IMPRESSION:  1. Posttreatment changes in the outer right breast without evolving worrisome  features. No evolving changes are otherwise seen of either breast to suggest  breast malignancy. Therefore, routine follow-up is recommended with screening  mammogram in 1 year unless clinically indicated sooner. A reminder letter will  be scheduled. BI-RADS Assessment Category 2: Benign finding(s).          IMPRESSION:  Mitchel Gates is a 65 y.o. female with right sided [id number] Stage IA invasive ductal carcinoma, two foci of disease, largest 0.7 cm, 92% ER, 7% IN, HER2 3+ positive S/P right breast lumpectomy, 08/26/16 F/B re-resection and SLN biopsy, 09/23/16. She completed weekly paclitaxel + trastuzumab for 12 weeks, and 1 year of Herceptin in November 2017. She completed radiation therapy 03/14/17. Negative mammogram, 5/16/2018. Ms Lovely Harper is recovering as anticipated from her previous radiation therapy. Recent mammogram, 05/16/2018 was negative for malignancy - recommending bilateral annual mammogram. Previous mammography recommended follow up breast MRI in September post radiation therapy. Dexa scan completed 04/6/17 shows osteopenia. PLAN:   1) schedule breast MRI for September per recommendation of radiology  2) continue lotions to radiation field  3) annual mammogram, 5/2019  4) continue calcium and vitamin D - repeat dexa 04/19  5) endocrine therapy under the direction of medical oncology, Dr. Gary Talbert  6) Follow up 6 months      Gayle Fall NP   May 24, 2018       Portions of this note were copied from prior encounters and reviewed for accuracy, currency, and represent documentation and tasks completed during this encounter. I verify and attest these portions to be unchanged from prior visits.

## 2018-09-12 ENCOUNTER — HOSPITAL ENCOUNTER (OUTPATIENT)
Dept: MRI IMAGING | Age: 63
Discharge: HOME | End: 2018-09-12
Attending: NURSE PRACTITIONER
Payer: OTHER GOVERNMENT

## 2018-09-12 DIAGNOSIS — C50.919 MALIGNANT NEOPLASM OF FEMALE BREAST, UNSPECIFIED ESTROGEN RECEPTOR STATUS, UNSPECIFIED LATERALITY, UNSPECIFIED SITE OF BREAST (HCC): ICD-10-CM

## 2018-09-12 LAB — CREAT BLD-MCNC: 0.8 MG/DL (ref 0.8–1.5)

## 2018-09-12 PROCEDURE — 74011000258 HC RX REV CODE- 258: Performed by: NURSE PRACTITIONER

## 2018-09-12 PROCEDURE — 74011250636 HC RX REV CODE- 250/636: Performed by: NURSE PRACTITIONER

## 2018-09-12 PROCEDURE — A9577 INJ MULTIHANCE: HCPCS | Performed by: NURSE PRACTITIONER

## 2018-09-12 PROCEDURE — 77059 MRI BREAST BI W WO CONT: CPT

## 2018-09-12 PROCEDURE — 82565 ASSAY OF CREATININE: CPT

## 2018-09-12 RX ORDER — SODIUM CHLORIDE 0.9 % (FLUSH) 0.9 %
10 SYRINGE (ML) INJECTION
Status: COMPLETED | OUTPATIENT
Start: 2018-09-12 | End: 2018-09-12

## 2018-09-12 RX ADMIN — Medication 10 ML: at 10:02

## 2018-09-12 RX ADMIN — SODIUM CHLORIDE 100 ML: 900 INJECTION, SOLUTION INTRAVENOUS at 10:02

## 2018-09-12 RX ADMIN — GADOBENATE DIMEGLUMINE 18 ML: 529 INJECTION, SOLUTION INTRAVENOUS at 10:01

## 2018-09-18 ENCOUNTER — HOSPITAL ENCOUNTER (OUTPATIENT)
Dept: LAB | Age: 63
Discharge: HOME | End: 2018-09-18
Payer: OTHER GOVERNMENT

## 2018-09-18 DIAGNOSIS — C50.911 INFILTRATING DUCTAL CARCINOMA OF RIGHT BREAST (HCC): ICD-10-CM

## 2018-09-18 LAB
ALBUMIN SERPL-MCNC: 4.1 G/DL (ref 3.2–4.6)
ALBUMIN/GLOB SERPL: 0.9 {RATIO} (ref 1.2–3.5)
ALP SERPL-CCNC: 88 U/L (ref 50–136)
ALT SERPL-CCNC: 17 U/L (ref 12–65)
ANION GAP SERPL CALC-SCNC: 5 MMOL/L (ref 7–16)
AST SERPL-CCNC: 13 U/L (ref 15–37)
BASOPHILS # BLD: 0 K/UL (ref 0–0.2)
BASOPHILS NFR BLD: 0 % (ref 0–2)
BILIRUB SERPL-MCNC: 0.4 MG/DL (ref 0.2–1.1)
BUN SERPL-MCNC: 13 MG/DL (ref 8–23)
CALCIUM SERPL-MCNC: 9.8 MG/DL (ref 8.3–10.4)
CHLORIDE SERPL-SCNC: 107 MMOL/L (ref 98–107)
CO2 SERPL-SCNC: 29 MMOL/L (ref 21–32)
CREAT SERPL-MCNC: 0.81 MG/DL (ref 0.6–1)
DIFFERENTIAL METHOD BLD: ABNORMAL
EOSINOPHIL # BLD: 0.1 K/UL (ref 0–0.8)
EOSINOPHIL NFR BLD: 3 % (ref 0.5–7.8)
ERYTHROCYTE [DISTWIDTH] IN BLOOD BY AUTOMATED COUNT: 13.2 % (ref 11.9–14.6)
GLOBULIN SER CALC-MCNC: 4.4 G/DL (ref 2.3–3.5)
GLUCOSE SERPL-MCNC: 92 MG/DL (ref 65–100)
HCT VFR BLD AUTO: 36.1 % (ref 35.8–46.3)
HGB BLD-MCNC: 11.9 G/DL (ref 11.7–15.4)
IMM GRANULOCYTES # BLD: 0 K/UL (ref 0–0.5)
IMM GRANULOCYTES NFR BLD AUTO: 0 % (ref 0–5)
LYMPHOCYTES # BLD: 1.6 K/UL (ref 0.5–4.6)
LYMPHOCYTES NFR BLD: 46 % (ref 13–44)
MCH RBC QN AUTO: 30 PG (ref 26.1–32.9)
MCHC RBC AUTO-ENTMCNC: 33 G/DL (ref 31.4–35)
MCV RBC AUTO: 90.9 FL (ref 79.6–97.8)
MONOCYTES # BLD: 0.4 K/UL (ref 0.1–1.3)
MONOCYTES NFR BLD: 12 % (ref 4–12)
NEUTS SEG # BLD: 1.4 K/UL (ref 1.7–8.2)
NEUTS SEG NFR BLD: 39 % (ref 43–78)
NRBC # BLD: 0 K/UL (ref 0–0.2)
PLATELET # BLD AUTO: 256 K/UL (ref 150–450)
PMV BLD AUTO: 9.1 FL (ref 9.4–12.3)
POTASSIUM SERPL-SCNC: 3.8 MMOL/L (ref 3.5–5.1)
PROT SERPL-MCNC: 8.5 G/DL (ref 6.3–8.2)
RBC # BLD AUTO: 3.97 M/UL (ref 4.05–5.25)
SODIUM SERPL-SCNC: 141 MMOL/L (ref 136–145)
WBC # BLD AUTO: 3.6 K/UL (ref 4.3–11.1)

## 2018-09-18 PROCEDURE — 36415 COLL VENOUS BLD VENIPUNCTURE: CPT

## 2018-09-18 PROCEDURE — 80053 COMPREHEN METABOLIC PANEL: CPT

## 2018-09-18 PROCEDURE — 85025 COMPLETE CBC W/AUTO DIFF WBC: CPT

## 2018-11-21 ENCOUNTER — HOSPITAL ENCOUNTER (OUTPATIENT)
Dept: RADIATION ONCOLOGY | Age: 63
Discharge: HOME | End: 2018-11-21
Payer: OTHER GOVERNMENT

## 2018-11-21 VITALS
HEART RATE: 77 BPM | RESPIRATION RATE: 18 BRPM | BODY MASS INDEX: 32.48 KG/M2 | WEIGHT: 189.2 LBS | DIASTOLIC BLOOD PRESSURE: 98 MMHG | OXYGEN SATURATION: 98 % | SYSTOLIC BLOOD PRESSURE: 179 MMHG | TEMPERATURE: 98.1 F

## 2018-11-21 PROCEDURE — 99211 OFF/OP EST MAY X REQ PHY/QHP: CPT

## 2018-11-21 NOTE — PROGRESS NOTES
Pt here today for FUP post RT to the right breast which ended 3/14/17. Pt is s/p a right lumpectomy 8/26/16. The 9/12/18 MRI breast indicated slight enhancement of the lumpectomy site. Pt is scheduled for another MRI Breast on 3/5/19. She is taking Tamoxifen as ordered. Pt has c/o discomfort and occ sharp shooting pains in her right breast--she stated she is very concerned about it. Pt was instructed by the NP to take OTC Calcium/Vitamin D.   She is due for her next Mammogram 5/2019, however that will be ordered by Dr. Eliel Dennison in [address] [place] [address] per NP.

## 2018-11-21 NOTE — PROGRESS NOTES
Patient: Ray Rich MRN: [id number]  SSN: [id number] YOB: 1955  Age: 65 y.o. Sex: female Other Providers:  Leslee Elder MD 
 
CHIEF COMPLAINT: Breast cancer DIAGNOSIS: Right sided [id number] Stage IA invasive ductal carcinoma, two foci of disease, largest 0.7 cm.  92% ER, 7% NH, HER2 3+ positive PREVIOUS TREATMENT: 
1) 8/26/16:  Right breast lumpectomy 2) 9/23/16: Re-resection and SLN biopsy 3) Weekly paclitaxel + trastuzumab for 12 weeks, followed by Herceptin monotherapy to complete a year 4) Radiation of the right breast with 25 fractions of 5000 cGy, 5 boost of 1000 cGy. Treatment dates: 02/-1/17 through 03/14/17 5) AI, Arimidex: March 2017 HISTORY OF PRESENT ILLNESS:  Ray Rich is a 65 y.o. female initially seen by Dr. Tomas Moeller 01/12/17 at the request of Leslee Elder MD.  She had a screening mammogram in June 2016 that showed a developing group of calcifications in the upper outer right breast. She completed biopsy which showed DCIS. MRI was also completed showing a 9 mm enhancing mass. She was referred for surgical management and underwent lumpectomy on 8/26/16. Pathology review surprisingly showed two foci of invasive ductal carcinoma in a background of DCIS. The larger of the two foci was 0.7 cm, and receptors showed 92% ER and 7% NH, but also showed HER2 3+ positive. She went back for sentinel node biopsy and reexcision, both of which were negative for residual carcinoma. She was referred to medical oncology for adjuvant recommendations and saw Dr. Sheeba Grullon who felt she would benefit from APT regimen with weekly paclitaxel + trastuzumab for 12 weeks, followed by Herceptin monotherapy to complete a year. She will also be receiving endocrine therapy with AI. She was seen by Dr. Sheeba Grullon prior to her final chemotherapy and noted nausea and fatigue from chemo but was manageable.  No fevers, chills or other infectious symptoms. Neuropathy grade 1, stable and not interfering with ADLs. Pain in the tips of her nails, but not interfering with ADLs. With the completion of chemo, she was referred for adjuvant radiation recommendations. INTERVAL HISTORY:  Ms Trupti Padron returns today for follow up, 20 months after completing radiation therapy for her right breast invasive ductal carcinoma. She tolerated radiation well. She denies any lingering side effects related to previous radiation of the breast. She was changed to Tamoxifen from Arimidex 2 months ago due to intolerance of Arimidex and is tolerating well. Good energy level. OBSTETRIC HISTORY:   
Menarche at the age of 14.   
G3,P1. Oral Contraceptive Pills:  Around 15 years Hormone Replacement Therapy:  Premarin in June and took only briefly PAST MEDICAL HISTORY:   
Past Medical History:  
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   
 diagnosed as teenager  Obesity 5/31/2016  Osteopenia  Paroxysmal atrial fibrillation ([facility] [address] 09/11/2001  Sleep apnea 09/11/2013  
 can not tolerate c pap  Vaginal atrophy 5/31/2016 The patient denies history of collagen vascular diseases, pacemaker insertion, prior radiation or prior chemotherapy. PAST SURGICAL HISTORY:  
Past Surgical History:  
Procedure Laterality Date  HX COLONOSCOPY  2007  
 diverticulosis  HX ORTHOPAEDIC    
 left wrist ORIF  
 HX TUBAL LIGATION    
 HX VASCULAR ACCESS    
 NELA BIOPSY BREAST STEREOTACTIC Right 7.19.16  
 
MEDICATIONS:  
 
Current Outpatient Medications:  
  LORazepam (ATIVAN) 0.5 mg tablet, Take 1 Tab by mouth daily as needed for Anxiety.  Indications: anxiety, Disp: 30 Tab, Rfl: 1 
  ondansetron hcl (ZOFRAN) 8 mg tablet, Take 1 Tab by mouth every eight (8) hours as needed for Nausea., Disp: 90 Tab, Rfl: 2   carvedilol (COREG) 25 mg tablet, Take 1 Tab by mouth two (2) times daily (with meals). , Disp: 180 Tab, Rfl: 3 
  triamterene-hydroCHLOROthiazide (MAXZIDE) 37.5-25 mg per tablet, Take 1 Tab by mouth daily. , Disp: 90 Tab, Rfl: 3 
  tamoxifen (NOLVADEX) 20 mg tablet, Take 1 Tab by mouth daily. , Disp: 30 Tab, Rfl: 3 
  zolpidem (AMBIEN) 10 mg tablet, Take 1 Tab by mouth nightly as needed for Sleep. Max Daily Amount: 10 mg., Disp: 30 Tab, Rfl: 0 
  acetaminophen (TYLENOL ARTHRITIS PAIN) 650 mg TbER, Take 650 mg by mouth every eight (8) hours. , Disp: , Rfl:  
  sertraline (ZOLOFT) 100 mg tablet, Take 1.5 Tabs by mouth daily. (Patient taking differently: Take 50 mg by mouth daily.), Disp: 135 Tab, Rfl: 1 
  DIPHENHYDRAMINE HCL (BENADRYL PO), Take  by mouth as needed. , Disp: , Rfl: ALLERGIES:  
Allergies Allergen Reactions  Pcn [Penicillins] Hives SOCIAL HISTORY:  
Social History Socioeconomic History  Marital status:  Spouse name: Not on file  Number of children: Not on file  Years of education: Not on file  Highest education level: Not on file Social Needs  Financial resource strain: Not on file  Food insecurity - worry: Not on file  Food insecurity - inability: Not on file  Transportation needs - medical: Not on file  Transportation needs - non-medical: Not on file Occupational History  Not on file Tobacco Use  Smoking status: Never Smoker  Smokeless tobacco: Never Used Substance and Sexual Activity  Alcohol use: No  
 Drug use: No  
 Sexual activity: Not on file Other Topics Concern  Not on file Social History Narrative Lives at home with , Madonna Kelly, and her mother. Homemaker. FAMILY HISTORY:  
Family History Problem Relation Age of Onset  Diabetes Mother  Alzheimer Mother  No Known Problems Father  Breast Cancer Maternal Aunt REVIEW OF SYSTEMS: Please see the completed review of systems sheet in the chart that I have reviewed today. PHYSICAL EXAMINATION:  
ECOG Performance status 0 
VITAL SIGNS: Blood pressure 179/98  Pulse77 Temperature 98.1 Weight 189.2 GENERAL: The patient is well-developed, ambulatory, alert and in no acute distress. BREASTS: Examination of the unaffected breast reveals no dominant nodules or masses. The lumpectomy cavity appears well healed with good aesthetics and symmetry. Well healed surgical incision. PATHOLOGY:   
8/26/16:   
 DIAGNOSIS RIGHT BREAST LOCALIZED WIDE EXCISION: TWO FOCI OF INFILTRATING DUCT CARCINOMA, LOW GRADE (WELL DIFFERENTIATED) IN A BACKGROUND OF MULTIFOCAL DUCTAL CARCINOMA IN-SITU, HIGH GRADE (CRIBRIFORM AND SOLID TYPE). ONE FOCUS OF INFILTRATING CARCINOMA IS APPROXIMATELY 0.7 X 0.4 CM ON SLIDE AND IS APPROXIMATELY 0.5 CM FROM MARKED ANTERIOR MARGIN. OTHER MARGINS ARE GREATER THAN 1 CM FROM THIS INFILTRATING TUMOR. SECOND FOCUS OF INFILTRATING CARCINOMA IS APPROXIMATELY 0.1 X 0.1 CM AND IS APPROXIMATELY 0.2 CM FROM MARKED POSTERIOR MARGIN. DUCTAL CARCINOMA IN-SITU IS PRESENT LESS THAN 0.1 CM FROM MARKED ANTERIOR MARGIN AND APPROXIMATELY 0.1 CM FROM MARKED POSTERIOR AND MEDIAL MARGINS. OTHER MARGINS ARE GREATER THAN 1 CM FROM TUMOR (SEE [id number]). Interpretation Clarient Diagnostic Result:  
TEST NAME: RESULTS: INTERNAL CONTROLS:  
ESTROGEN RECEPTOR: Positive (92%) Present and ER positive PROGESTERONE RECEPTOR: Positive (7%) Present and ND positive HER-2/JAGDISH: Positive (3+) Percent of cells with 3+ stain: 38% ANATOMIC SITE: Right breast (presumably 10 o'clock position based on prior core biopsy). PROCEDURE: Needle localized wide excision. HISTOLOGIC TYPE: Infiltrating duct carcinoma. SIZE: Two foci, one approximately 0.7 x 0.4 cm and an apparent separate focus approximately 0.1 x 0.1 cm.   
UNIFOCAL OR MULTIFOCAL: Apparently multifocal.  
 PRESENCE OF SKIN, NIPPLE OR SKELETAL MUSCLE INVOLVEMENT: Not identified. ZARA MODIFICATION OF BLOOM-KAPADIA GRADE:  
ARCHITECTURAL SCORE: 2/3. NUCLEAR SCORE: 2/3. MITOTIC SCORE: 1/3. TOTAL SCORE: 5/9 = Low Grade for both tumors. IN-SITU COMPONENT: Multifocal ductal carcinoma in-situ is noted, high grade (cribriform and solid type). LYMPHOVASCULAR INVASION: Not identified. ARE MICROCALCIFICATIONS IDENTIFIED: Within ductal carcinoma in-situ. TUMOR DISTANCE TO CLOSEST MARGIN: Ductal carcinoma in-situ is present less than 0.1 cm from marked anterior margin and approximately 0.1 cm from marked medial margin. Small focus of infiltrating carcinoma is present approximately 0.2 cm from marked deep margin. ANTERIOR (SUPERFICIAL): Ductal carcinoma in-situ is present less than 0.1 cm from marked anterior margin and larger focus of infiltrating carcinoma is present approximately 0.5 cm from marked anterior margin. POSTERIOR (DEEP): Focus of ductal carcinoma in-situ is present approximately 0.1 cm from marked posterior margin and small focus of infiltrating carcinoma is present approximately 0.2 cm from marked posterior margin. MEDIAL: Focus of ductal carcinoma in-situ is present approximately 0.1 cm from marked medial margin. LATERAL: Greater than 1 cm. INFERIOR: Greater than 1 cm. SUPERIOR: Greater than 1 cm. LYMPH NODE STATUS: Does not apply. TOTAL NUMBER OF LYMPH NODES CONTAINING CARCINOMA: Does not apply. TOTAL NUMBER OF LYMPH NODES EXAMINED: Does not apply. METASTASIS: Does not apply. SIZE OF LARGEST POSITIVE NODE: Does not apply. EXTRA-CAPSULAR EXTENSION OF TUMOR: Does not apply. OTHER FINDINGS: Changes consistent with prior biopsy site. TNM CLASSIFICATION: pT1 pNX.  
 
9/23/16: 
 DIAGNOSIS  
A: POST LUMPECTOMY MARGIN RIGHT BREAST:  
BENIGN BREAST TISSUE WITH FIBROCYSTIC MASTOPATHY AND FAT NECROSIS. NO RESIDUAL CARCINOMA IS IDENTIFIED. B: SENTINEL NODE RIGHT BREAST:  
ONE LYMPH NODE NEGATIVE FOR METASTATIC  
 
LABORATORY: none today RADIOLOGY:   
BILATERAL DIAGNOSTIC MAMMOGRAM, 5/16/2018 
  
CLINICAL HISTORY: History of right breast cancer status post lumpectomy. No 
current breast complaints. 
  
Comparison studies:  Bilateral diagnostic mammogram 5/8/2017, bilateral 
screening mammogram 6/24/2016, and right breast diagnostic mammogram 11/2/2017. 
  
FINDINGS: 
BILATERAL DIAGNOSTIC MAMMOGRAM: 
Bilateral digital mammography was performed, and interpreted in conjunction with CAD overread. The breasts are composed of scattered fibroglandular elements. The axilla contain benign appearing lymph nodes and benign and dermal 
calcifications. Grossly stable right breast skin thickening is seen. No evolving 
nipple retraction is seen. Bilateral breast calcifications are seen which are 
not felt to be significantly changed. No evolving unique clustered 
microcalcifications are seen to suggest a worrisome process. No evolving 
worrisome dominant mass, spiculated density, or architectural distortion is 
seen. Outer right breast asymmetry and architectural changes from prior 
lumpectomy are once again seen. These are felt to be decreased in size from the 
most recent diagnostic mammogram which is consistent with expected postsurgical 
evolution. No evolving worrisome features are seen. 
  
IMPRESSION: 
1. Posttreatment changes in the outer right breast without evolving worrisome 
features. No evolving changes are otherwise seen of either breast to suggest 
breast malignancy. Therefore, routine follow-up is recommended with screening 
mammogram in 1 year unless clinically indicated sooner. A reminder letter will 
be scheduled. BI-RADS Assessment Category 2: Benign finding(s).  
  
   
IMPRESSION:  Alexia Bradshaw is a 65 y.o. female with right sided [id number] Stage IA invasive ductal carcinoma, two foci of disease, largest 0.7 cm, 92% ER, 7% WI, HER2 3+ positive S/P right breast lumpectomy, 08/26/16 F/B re-resection and SLN biopsy, 09/23/16. She completed weekly paclitaxel + trastuzumab for 12 weeks, and 1 year of Herceptin in November 2017. She completed radiation therapy 03/14/17. Negative mammogram, 5/16/2018. Ms Abena Duncan is recovering as anticipated from her previous radiation therapy. Recent mammogram, 05/16/2018 was negative for malignancy - recommending bilateral annual mammogram. MRI breast in September recommends follow up in 6 months for a mild enhancement along the inferior aspect of the lumpectomy cavity. Dexa scan completed 04/6/17 shows osteopenia. PLAN:  
1) Breast MRI scheduled, 3/5/19 by medical oncology 2) Encouraged SBE 
3) Encouraged restart of calcium and vitamin D - repeat dexa 04/19 4) Tamoxifen under the direction of medical oncology, Dr. Thu Chase 5) Follow up 6 months 6) I spent 30 minutes in the care of Ms Abena Duncan today, over 50% of which was in direct counseling and ongoing management of her right IDC. All questions were answered to the best of my ability. Vargas Ashley NP November 21, 2018 Portions of this note were copied from prior encounters and reviewed for accuracy, currency, and represent documentation and tasks completed during this encounter. I verify and attest these portions to be unchanged from prior visits.

## 2019-03-05 ENCOUNTER — HOSPITAL ENCOUNTER (OUTPATIENT)
Dept: MRI IMAGING | Age: 64
Discharge: HOME | End: 2019-03-05
Attending: NURSE PRACTITIONER
Payer: OTHER GOVERNMENT

## 2019-03-05 DIAGNOSIS — C50.911 INFILTRATING DUCTAL CARCINOMA OF RIGHT BREAST (HCC): ICD-10-CM

## 2019-03-05 PROCEDURE — 74011000258 HC RX REV CODE- 258: Performed by: NURSE PRACTITIONER

## 2019-03-05 PROCEDURE — 74011250636 HC RX REV CODE- 250/636: Performed by: NURSE PRACTITIONER

## 2019-03-05 PROCEDURE — A9575 INJ GADOTERATE MEGLUMI 0.1ML: HCPCS | Performed by: NURSE PRACTITIONER

## 2019-03-05 PROCEDURE — 77049 MRI BREAST C-+ W/CAD BI: CPT

## 2019-03-05 RX ORDER — GADOTERATE MEGLUMINE 376.9 MG/ML
18 INJECTION INTRAVENOUS
Status: COMPLETED | OUTPATIENT
Start: 2019-03-05 | End: 2019-03-05

## 2019-03-05 RX ORDER — SODIUM CHLORIDE 0.9 % (FLUSH) 0.9 %
10 SYRINGE (ML) INJECTION
Status: COMPLETED | OUTPATIENT
Start: 2019-03-05 | End: 2019-03-05

## 2019-03-05 RX ADMIN — Medication 10 ML: at 10:49

## 2019-03-05 RX ADMIN — GADOTERATE MEGLUMINE 18 ML: 376.9 INJECTION INTRAVENOUS at 10:49

## 2019-03-05 RX ADMIN — SODIUM CHLORIDE 100 ML: 900 INJECTION, SOLUTION INTRAVENOUS at 10:49

## 2019-03-29 ENCOUNTER — HOSPITAL ENCOUNTER (OUTPATIENT)
Dept: LAB | Age: 64
Discharge: HOME | End: 2019-03-29
Payer: OTHER GOVERNMENT

## 2019-03-29 DIAGNOSIS — D05.10 DUCTAL CARCINOMA IN SITU (DCIS) OF BREAST, UNSPECIFIED LATERALITY: ICD-10-CM

## 2019-03-29 LAB
ALBUMIN SERPL-MCNC: 4.1 G/DL (ref 3.2–4.6)
ALBUMIN/GLOB SERPL: 1 {RATIO} (ref 1.2–3.5)
ALP SERPL-CCNC: 80 U/L (ref 50–136)
ALT SERPL-CCNC: 14 U/L (ref 12–65)
ANION GAP SERPL CALC-SCNC: 6 MMOL/L (ref 7–16)
AST SERPL-CCNC: 13 U/L (ref 15–37)
BASOPHILS # BLD: 0 K/UL (ref 0–0.2)
BASOPHILS NFR BLD: 1 % (ref 0–2)
BILIRUB SERPL-MCNC: 0.4 MG/DL (ref 0.2–1.1)
BUN SERPL-MCNC: 14 MG/DL (ref 8–23)
CALCIUM SERPL-MCNC: 9.1 MG/DL (ref 8.3–10.4)
CHLORIDE SERPL-SCNC: 110 MMOL/L (ref 98–107)
CO2 SERPL-SCNC: 25 MMOL/L (ref 21–32)
CREAT SERPL-MCNC: 0.78 MG/DL (ref 0.6–1)
DIFFERENTIAL METHOD BLD: ABNORMAL
EOSINOPHIL # BLD: 0.1 K/UL (ref 0–0.8)
EOSINOPHIL NFR BLD: 2 % (ref 0.5–7.8)
ERYTHROCYTE [DISTWIDTH] IN BLOOD BY AUTOMATED COUNT: 13 % (ref 11.9–14.6)
GLOBULIN SER CALC-MCNC: 4.1 G/DL (ref 2.3–3.5)
GLUCOSE SERPL-MCNC: 110 MG/DL (ref 65–100)
HCT VFR BLD AUTO: 36.1 % (ref 35.8–46.3)
HGB BLD-MCNC: 12 G/DL (ref 11.7–15.4)
IMM GRANULOCYTES # BLD AUTO: 0 K/UL (ref 0–0.5)
IMM GRANULOCYTES NFR BLD AUTO: 0 % (ref 0–5)
LYMPHOCYTES # BLD: 1.4 K/UL (ref 0.5–4.6)
LYMPHOCYTES NFR BLD: 40 % (ref 13–44)
MCH RBC QN AUTO: 30.2 PG (ref 26.1–32.9)
MCHC RBC AUTO-ENTMCNC: 33.2 G/DL (ref 31.4–35)
MCV RBC AUTO: 90.9 FL (ref 79.6–97.8)
MONOCYTES # BLD: 0.4 K/UL (ref 0.1–1.3)
MONOCYTES NFR BLD: 11 % (ref 4–12)
NEUTS SEG # BLD: 1.7 K/UL (ref 1.7–8.2)
NEUTS SEG NFR BLD: 47 % (ref 43–78)
NRBC # BLD: 0 K/UL (ref 0–0.2)
PLATELET # BLD AUTO: 277 K/UL (ref 150–450)
PMV BLD AUTO: 9.3 FL (ref 9.4–12.3)
POTASSIUM SERPL-SCNC: 3.6 MMOL/L (ref 3.5–5.1)
PROT SERPL-MCNC: 8.2 G/DL (ref 6.3–8.2)
RBC # BLD AUTO: 3.97 M/UL (ref 4.05–5.25)
SODIUM SERPL-SCNC: 141 MMOL/L (ref 136–145)
WBC # BLD AUTO: 3.6 K/UL (ref 4.3–11.1)

## 2019-03-29 PROCEDURE — 80053 COMPREHEN METABOLIC PANEL: CPT

## 2019-03-29 PROCEDURE — 36415 COLL VENOUS BLD VENIPUNCTURE: CPT

## 2019-03-29 PROCEDURE — 85025 COMPLETE CBC W/AUTO DIFF WBC: CPT

## 2019-05-03 ENCOUNTER — HOSPITAL ENCOUNTER (OUTPATIENT)
Dept: MAMMOGRAPHY | Age: 64
Discharge: HOME | End: 2019-05-03
Attending: FAMILY MEDICINE
Payer: OTHER GOVERNMENT

## 2019-05-03 DIAGNOSIS — Z78.0 POST-MENOPAUSE: ICD-10-CM

## 2019-05-03 PROCEDURE — 77080 DXA BONE DENSITY AXIAL: CPT

## 2019-05-06 NOTE — PROGRESS NOTES
Your bone density shows continued osteopenia. Make sure to do daily weightbearing exercises, take daily calcium and vitamin D, at least 2000 IUs vitamin D3 daily. Make sure to do at least 30 minutes daily and weightbearing exercise.

## 2019-05-15 VITALS — WEIGHT: 190 LBS | BODY MASS INDEX: 32.44 KG/M2 | HEIGHT: 64 IN

## 2019-05-15 RX ORDER — DIPHENHYDRAMINE HCL 25 MG
25 CAPSULE ORAL
COMMUNITY

## 2019-05-15 NOTE — PERIOP NOTES
Patient verified name and . Order for consent NOT found in EHR; patient verifies procedure. Type 1B surgery, phone assessment complete. Orders NOT received. Labs per surgeon: unknown; no orders received at time of phone assessment. Labs per anesthesia protocol: HGB, K+. Coming to the outpatient lab. Most recent [facility] cardiology office note dated 19, ekg dated 19, and echo dated 18 available in EHR for reference if needed. Patient answered medical/surgical history questions at their best of ability. All prior to admission medications documented in [facility]. Patient instructed to take the following medications the day of surgery according to anesthesia guidelines with a small sip of water: carvedilol, tylenol if needed, ativan if needed, zofran if needed. Hold all vitamins 7 days prior to surgery and NSAIDS 5 days prior to surgery. Prescription meds to hold: none. Patient instructed on the following:  Arrive at A Entrance, time of arrival to be called the day before by 1700  NPO after midnight including gum, mints, and ice chips  Responsible adult must drive patient to the hospital, stay during surgery, and patient will need supervision 24 hours after anesthesia  Use dial antibacterial soap in shower the night before surgery and on the morning of surgery  All piercings must be removed prior to arrival.    Leave all valuables (money and jewelry) at home but bring insurance card and ID on DOS. Do not wear make-up, nail polish, lotions, cologne, perfumes, powders, or oil on skin. Patient teach back successful and patient demonstrates knowledge of instruction.

## 2019-05-16 ENCOUNTER — HOSPITAL ENCOUNTER (OUTPATIENT)
Dept: SURGERY | Age: 64
Discharge: HOME | End: 2019-05-16

## 2019-05-20 ENCOUNTER — HOSPITAL ENCOUNTER (OUTPATIENT)
Dept: LAB | Age: 64
Discharge: HOME | End: 2019-05-20
Attending: OBSTETRICS & GYNECOLOGY
Payer: OTHER GOVERNMENT

## 2019-05-20 ENCOUNTER — HOSPITAL ENCOUNTER (OUTPATIENT)
Dept: MAMMOGRAPHY | Age: 64
Discharge: HOME | End: 2019-05-20
Attending: INTERNAL MEDICINE
Payer: OTHER GOVERNMENT

## 2019-05-20 DIAGNOSIS — Z12.39 BREAST CANCER SCREENING, HIGH RISK PATIENT: ICD-10-CM

## 2019-05-20 LAB
HGB BLD-MCNC: 11.3 G/DL (ref 11.7–15.4)
POTASSIUM SERPL-SCNC: 3.6 MMOL/L (ref 3.5–5.1)

## 2019-05-20 PROCEDURE — 36415 COLL VENOUS BLD VENIPUNCTURE: CPT

## 2019-05-20 PROCEDURE — 77067 SCR MAMMO BI INCL CAD: CPT

## 2019-05-20 PROCEDURE — 85018 HEMOGLOBIN: CPT

## 2019-05-20 PROCEDURE — 84132 ASSAY OF SERUM POTASSIUM: CPT

## 2019-05-20 NOTE — PERIOP NOTES
Lab results within limits per anesthesia protocol; OK for surgery. Recent Results (from the past 12 hour(s)) HEMOGLOBIN Collection Time: 05/20/19 12:05 PM  
Result Value Ref Range HGB 11.3 (L) 11.7 - 15.4 g/dL POTASSIUM Collection Time: 05/20/19 12:05 PM  
Result Value Ref Range  Potassium 3.6 3.5 - 5.1 mmol/L

## 2019-05-21 ENCOUNTER — ANESTHESIA EVENT (OUTPATIENT)
Dept: SURGERY | Age: 64
End: 2019-05-21
Payer: OTHER GOVERNMENT

## 2019-05-22 ENCOUNTER — ANESTHESIA (OUTPATIENT)
Dept: SURGERY | Age: 64
End: 2019-05-22
Payer: OTHER GOVERNMENT

## 2019-05-22 ENCOUNTER — HOSPITAL ENCOUNTER (OUTPATIENT)
Age: 64
Setting detail: OUTPATIENT SURGERY
Discharge: HOME | End: 2019-05-22
Attending: OBSTETRICS & GYNECOLOGY | Admitting: OBSTETRICS & GYNECOLOGY
Payer: OTHER GOVERNMENT

## 2019-05-22 VITALS
RESPIRATION RATE: 16 BRPM | HEIGHT: 64 IN | TEMPERATURE: 97.7 F | DIASTOLIC BLOOD PRESSURE: 86 MMHG | BODY MASS INDEX: 33.03 KG/M2 | HEART RATE: 64 BPM | OXYGEN SATURATION: 100 % | SYSTOLIC BLOOD PRESSURE: 162 MMHG | WEIGHT: 193.5 LBS

## 2019-05-22 PROCEDURE — 77030032490 HC SLV COMPR SCD KNE COVD -B: Performed by: OBSTETRICS & GYNECOLOGY

## 2019-05-22 PROCEDURE — 76060000032 HC ANESTHESIA 0.5 TO 1 HR: Performed by: OBSTETRICS & GYNECOLOGY

## 2019-05-22 PROCEDURE — 77030010509 HC AIRWY LMA MSK TELE -A: Performed by: ANESTHESIOLOGY

## 2019-05-22 PROCEDURE — 77030018836 HC SOL IRR NACL ICUM -A: Performed by: OBSTETRICS & GYNECOLOGY

## 2019-05-22 PROCEDURE — 77030012317 HC CATH URET INT COVD -A: Performed by: OBSTETRICS & GYNECOLOGY

## 2019-05-22 PROCEDURE — 74011250636 HC RX REV CODE- 250/636: Performed by: ANESTHESIOLOGY

## 2019-05-22 PROCEDURE — 76210000020 HC REC RM PH II FIRST 0.5 HR: Performed by: OBSTETRICS & GYNECOLOGY

## 2019-05-22 PROCEDURE — 74011000250 HC RX REV CODE- 250

## 2019-05-22 PROCEDURE — 74011250637 HC RX REV CODE- 250/637: Performed by: ANESTHESIOLOGY

## 2019-05-22 PROCEDURE — 76010000138 HC OR TIME 0.5 TO 1 HR: Performed by: OBSTETRICS & GYNECOLOGY

## 2019-05-22 PROCEDURE — 74011250636 HC RX REV CODE- 250/636

## 2019-05-22 PROCEDURE — 76210000000 HC OR PH I REC 2 TO 2.5 HR: Performed by: OBSTETRICS & GYNECOLOGY

## 2019-05-22 PROCEDURE — 88305 TISSUE EXAM BY PATHOLOGIST: CPT

## 2019-05-22 RX ORDER — ONDANSETRON 2 MG/ML
INJECTION INTRAMUSCULAR; INTRAVENOUS AS NEEDED
Status: DISCONTINUED | OUTPATIENT
Start: 2019-05-22 | End: 2019-05-22 | Stop reason: HOSPADM

## 2019-05-22 RX ORDER — PROPOFOL 10 MG/ML
INJECTION, EMULSION INTRAVENOUS AS NEEDED
Status: DISCONTINUED | OUTPATIENT
Start: 2019-05-22 | End: 2019-05-22 | Stop reason: HOSPADM

## 2019-05-22 RX ORDER — LIDOCAINE HYDROCHLORIDE 10 MG/ML
0.3 INJECTION INFILTRATION; PERINEURAL ONCE
Status: COMPLETED | OUTPATIENT
Start: 2019-05-22 | End: 2019-05-22

## 2019-05-22 RX ORDER — MIDAZOLAM HYDROCHLORIDE 1 MG/ML
2 INJECTION, SOLUTION INTRAMUSCULAR; INTRAVENOUS
Status: COMPLETED | OUTPATIENT
Start: 2019-05-22 | End: 2019-05-22

## 2019-05-22 RX ORDER — HYDROMORPHONE HYDROCHLORIDE 2 MG/ML
0.5 INJECTION, SOLUTION INTRAMUSCULAR; INTRAVENOUS; SUBCUTANEOUS
Status: DISCONTINUED | OUTPATIENT
Start: 2019-05-22 | End: 2019-05-22 | Stop reason: HOSPADM

## 2019-05-22 RX ORDER — FENTANYL CITRATE 50 UG/ML
INJECTION, SOLUTION INTRAMUSCULAR; INTRAVENOUS AS NEEDED
Status: DISCONTINUED | OUTPATIENT
Start: 2019-05-22 | End: 2019-05-22 | Stop reason: HOSPADM

## 2019-05-22 RX ORDER — SODIUM CHLORIDE, SODIUM LACTATE, POTASSIUM CHLORIDE, CALCIUM CHLORIDE 600; 310; 30; 20 MG/100ML; MG/100ML; MG/100ML; MG/100ML
100 INJECTION, SOLUTION INTRAVENOUS CONTINUOUS
Status: DISCONTINUED | OUTPATIENT
Start: 2019-05-22 | End: 2019-05-22 | Stop reason: HOSPADM

## 2019-05-22 RX ORDER — SODIUM CHLORIDE, SODIUM LACTATE, POTASSIUM CHLORIDE, CALCIUM CHLORIDE 600; 310; 30; 20 MG/100ML; MG/100ML; MG/100ML; MG/100ML
100 INJECTION, SOLUTION INTRAVENOUS CONTINUOUS
Status: ACTIVE | OUTPATIENT
Start: 2019-05-22 | End: 2019-05-22

## 2019-05-22 RX ORDER — OXYCODONE HYDROCHLORIDE 5 MG/1
10 TABLET ORAL
Status: DISCONTINUED | OUTPATIENT
Start: 2019-05-22 | End: 2019-05-22 | Stop reason: HOSPADM

## 2019-05-22 RX ORDER — ACETAMINOPHEN 500 MG
1000 TABLET ORAL ONCE
Status: COMPLETED | OUTPATIENT
Start: 2019-05-22 | End: 2019-05-22

## 2019-05-22 RX ORDER — DEXAMETHASONE SODIUM PHOSPHATE 4 MG/ML
INJECTION, SOLUTION INTRA-ARTICULAR; INTRALESIONAL; INTRAMUSCULAR; INTRAVENOUS; SOFT TISSUE AS NEEDED
Status: DISCONTINUED | OUTPATIENT
Start: 2019-05-22 | End: 2019-05-22 | Stop reason: HOSPADM

## 2019-05-22 RX ORDER — LIDOCAINE HYDROCHLORIDE 20 MG/ML
INJECTION, SOLUTION EPIDURAL; INFILTRATION; INTRACAUDAL; PERINEURAL AS NEEDED
Status: DISCONTINUED | OUTPATIENT
Start: 2019-05-22 | End: 2019-05-22 | Stop reason: HOSPADM

## 2019-05-22 RX ORDER — EPHEDRINE SULFATE 50 MG/ML
INJECTION, SOLUTION INTRAVENOUS AS NEEDED
Status: DISCONTINUED | OUTPATIENT
Start: 2019-05-22 | End: 2019-05-22 | Stop reason: HOSPADM

## 2019-05-22 RX ADMIN — HYDROMORPHONE HYDROCHLORIDE 0.5 MG: 2 INJECTION INTRAMUSCULAR; INTRAVENOUS; SUBCUTANEOUS at 11:55

## 2019-05-22 RX ADMIN — ACETAMINOPHEN 1000 MG: 500 TABLET, FILM COATED ORAL at 09:30

## 2019-05-22 RX ADMIN — FENTANYL CITRATE 25 MCG: 50 INJECTION, SOLUTION INTRAMUSCULAR; INTRAVENOUS at 11:10

## 2019-05-22 RX ADMIN — HYDROMORPHONE HYDROCHLORIDE 0.5 MG: 2 INJECTION INTRAMUSCULAR; INTRAVENOUS; SUBCUTANEOUS at 11:50

## 2019-05-22 RX ADMIN — SODIUM CHLORIDE, SODIUM LACTATE, POTASSIUM CHLORIDE, AND CALCIUM CHLORIDE 100 ML/HR: 600; 310; 30; 20 INJECTION, SOLUTION INTRAVENOUS at 09:40

## 2019-05-22 RX ADMIN — LIDOCAINE HYDROCHLORIDE 0.3 ML: 10 INJECTION, SOLUTION INFILTRATION; PERINEURAL at 09:40

## 2019-05-22 RX ADMIN — FENTANYL CITRATE 25 MCG: 50 INJECTION, SOLUTION INTRAMUSCULAR; INTRAVENOUS at 11:01

## 2019-05-22 RX ADMIN — MIDAZOLAM 2 MG: 1 INJECTION INTRAMUSCULAR; INTRAVENOUS at 09:47

## 2019-05-22 RX ADMIN — DEXAMETHASONE SODIUM PHOSPHATE 5 MG: 4 INJECTION, SOLUTION INTRA-ARTICULAR; INTRALESIONAL; INTRAMUSCULAR; INTRAVENOUS; SOFT TISSUE at 11:08

## 2019-05-22 RX ADMIN — FENTANYL CITRATE 25 MCG: 50 INJECTION, SOLUTION INTRAMUSCULAR; INTRAVENOUS at 11:20

## 2019-05-22 RX ADMIN — FENTANYL CITRATE 25 MCG: 50 INJECTION, SOLUTION INTRAMUSCULAR; INTRAVENOUS at 11:23

## 2019-05-22 RX ADMIN — ONDANSETRON 4 MG: 2 INJECTION INTRAMUSCULAR; INTRAVENOUS at 11:09

## 2019-05-22 RX ADMIN — LIDOCAINE HYDROCHLORIDE 100 MG: 20 INJECTION, SOLUTION EPIDURAL; INFILTRATION; INTRACAUDAL; PERINEURAL at 11:01

## 2019-05-22 RX ADMIN — EPHEDRINE SULFATE 10 MG: 50 INJECTION, SOLUTION INTRAVENOUS at 11:15

## 2019-05-22 RX ADMIN — EPHEDRINE SULFATE 10 MG: 50 INJECTION, SOLUTION INTRAVENOUS at 11:21

## 2019-05-22 RX ADMIN — SODIUM CHLORIDE, SODIUM LACTATE, POTASSIUM CHLORIDE, AND CALCIUM CHLORIDE: 600; 310; 30; 20 INJECTION, SOLUTION INTRAVENOUS at 11:24

## 2019-05-22 RX ADMIN — PROPOFOL 200 MG: 10 INJECTION, EMULSION INTRAVENOUS at 11:01

## 2019-05-22 RX ADMIN — HYDROMORPHONE HYDROCHLORIDE 0.5 MG: 2 INJECTION INTRAMUSCULAR; INTRAVENOUS; SUBCUTANEOUS at 12:05

## 2019-05-22 NOTE — ANESTHESIA POSTPROCEDURE EVALUATION
Procedure(s):  DILATATION AND CURETTAGE HYSTEROSCOPY.     general    Anesthesia Post Evaluation      Multimodal analgesia: multimodal analgesia used between 6 hours prior to anesthesia start to PACU discharge  Patient location during evaluation: PACU  Level of consciousness: awake and alert  Pain management: adequate  Airway patency: patent  Anesthetic complications: no  Cardiovascular status: acceptable  Respiratory status: acceptable  Hydration status: acceptable  Post anesthesia nausea and vomiting:  none      Vitals Value Taken Time   /89 5/22/2019 12:20 PM   Temp 36.5 °C (97.7 °F) 5/22/2019 11:35 AM   Pulse 68 5/22/2019 12:20 PM   Resp 16 5/22/2019 12:20 PM   SpO2 96 % 5/22/2019 12:20 PM

## 2019-05-22 NOTE — DISCHARGE INSTRUCTIONS
Hysteroscopy: What to Expect at [facility] hysteroscopy is a procedure to find and treat problems with your uterus. It may have been done to remove growths from the uterus. Or it may have been done to diagnose or treat fertility problems. It can also be used to diagnose or treat abnormal bleeding. If the doctor filled your uterus with air, you may have gas pains or your belly may feel full. You may have cramps and light vaginal bleeding for a few days to several weeks. The cramps and bleeding may last longer if the hysteroscopy was used for treatment. You may also have shoulder pain right after the procedure. If the doctor filled your uterus with liquid, you may have watery vaginal discharge for several weeks. This care sheet gives you a general idea about how long it will take for you to recover. But each person recovers at a different pace. Follow the steps below to get better as quickly as possible. How can you care for yourself at home? Activity    · Rest when you feel tired.     · You can do your normal activities when it feels okay to do so.     · You may shower and take baths as usual.     · Ask your doctor when it is okay for you to have sex. Diet    · You can eat your normal diet. If your stomach is upset, try bland, low-fat foods like plain rice, broiled chicken, toast, and yogurt.     · If your bowel movements are not regular right after surgery, try to avoid constipation and straining. Drink plenty of water. Your doctor may suggest fiber, a stool softener, or a mild laxative. Medicines    · Your doctor will tell you if and when you can restart your medicines. He or she will also give you instructions about taking any new medicines.     · If you take aspirin or some other blood thinner, be sure to talk to your doctor. He or she will tell you if and when to start taking this medicine again.  Make sure that you understand exactly what your doctor wants you to do.     · Be safe with medicines. Read and follow all instructions on the label. ? If the doctor gave you a prescription medicine for pain, take it as prescribed. ? If you are not taking a prescription pain medicine, ask your doctor if you can take an over-the-counter medicine.     · If your doctor prescribed antibiotics, take them as directed. Do not stop taking them just because you feel better. You need to take the full course of antibiotics. Other instructions    · You may have some light vaginal bleeding. Wear sanitary pads if needed. Do not use tampons until your doctor says it is okay.     · You may want to use a heating pad on your belly to help with pain. Use a low heat setting.     · Talk about birth control with your doctor. Do not try to become pregnant until your doctor says it is okay. Follow-up care is a key part of your treatment and safety. Be sure to make and go to all appointments, and call your doctor if you are having problems. It's also a good idea to know your test results and keep a list of the medicines you take. When should you call for help? Call 911 anytime you think you may need emergency care. For example, call if:    · You passed out (lost consciousness).     · You have chest pain, are short of breath, or cough up blood.    Call your doctor now or seek immediate medical care if:    · You have pain that does not get better after you take pain medicine.     · You cannot pass stools or gas.     · You have vaginal discharge that has increased in amount or smells bad.     · You are sick to your stomach or cannot drink fluids.     · You have signs of infection, such as:  ? Increased pain, swelling, warmth, or redness. ? A fever.     · You have bright red vaginal bleeding that soaks one or more pads in an hour, or you have large clots.     · You have signs of a blood clot in your leg (called a deep vein thrombosis), such as:  ? Pain in your calf, back of the knee, thigh, or groin. ?  Redness and swelling in your leg.    Watch closely for changes in your health, and be sure to contact your doctor if you have any problems. Where can you learn more? Go to http://antonio-olman.info/. Enter I360 in the search box to learn more about \"Hysteroscopy: What to Expect at Home. \"  Current as of: May 14, 2018  Content Version: 11.9  © 9826-5128 [employer], [employer]. Care instructions adapted under license by [employer] (which disclaims liability or warranty for this information). If you have questions about a medical condition or this instruction, always ask your healthcare professional. [address] any warranty or liability for your use of this information.

## 2019-05-22 NOTE — ANESTHESIA PREPROCEDURE EVALUATION
Relevant Problems   No relevant active problems       Anesthetic History               Review of Systems / Medical History      Pulmonary        Sleep apnea (has CPAP but does not wear it regularly): No treatment           Neuro/Psych         Psychiatric history     Cardiovascular    Hypertension        Dysrhythmias (paroxysmal) : atrial fibrillation      Exercise tolerance: >4 METS     GI/Hepatic/Renal  Within defined limits              Endo/Other        Obesity     Other Findings              Physical Exam    Airway  Mallampati: III  TM Distance: 4 - 6 cm  Neck ROM: decreased range of motion   Mouth opening: Normal     Cardiovascular    Rhythm: regular  Rate: normal         Dental  No notable dental hx       Pulmonary  Breath sounds clear to auscultation               Abdominal         Other Findings            Anesthetic Plan    ASA: 2  Anesthesia type: general          Induction: Intravenous  Anesthetic plan and risks discussed with: Patient

## 2019-05-22 NOTE — OP NOTES
HYSTEROSCOPY WITH DILATATION AND CURETTAGE    DATE OF PROCEDURE: 5/22/2019     PREOPERATIVE DIAGNOSIS: Postmenopausal bleeding [N95.0]  Thickened endometrium [R93.89]     POSTOPERATWE DIAGNOSIS: Postmenopausal bleeding [N95.0]  Thickened endometrium [R93.89]    PROCEDURE: Hysteroscopy with dilatation & curettage. SURGEON: Ingris Hameed MD    ANESTHESIA: General.    DRAINS: Sterile in and out catheterization of the bladder. FINDINGS: mild endometrial thickening    PROCEDURE IN DETAIL: The patient was taken to the Operating Room. After adequate anesthesia was established she was properly positioned, scrubbed, prepped and draped. Time out was done to confirm the operating procedure, surgeon, patient and site. Once confirmed by the team, procedure was started. The weighted speculum was placed in the vault to expose the cervix, was grasped at 12 oclock with the single-tooth tenaculum and sounded to 8 cm. It was sequentially dilated prior to the hysteroscope being inserted with the above noted findings. A very gentle but homogenous curetting was done starting in the midline and working in a clockwise manner. Endometrial tissue was retrieved. The hysteroscope was reinserted again. With this complete and thorough visual review, we terminated the procedure. With the sponge, instrument and needle count correct, the patient was awakened and taken to the Recovery Room in satisfactory condition. BLOOD LOSS ESTIMATED: Minimal    SPECIMENS:Endometrial curettings. Pt discharged to home . Precautions given .

## 2019-05-22 NOTE — H&P
Subjective:     Patient is a 59 y.o.  female presents with postmenopausal bleeding. rapidly worsening course. Patient Active Problem List    Diagnosis Date Noted    SVT (supraventricular tachycardia) ([address] 12/14/2017    Osteopenia     High risk medication use 05/10/2017    Mixed hyperlipidemia 11/03/2016    Anxiety 10/03/2016    Essential tremor 10/03/2016    Infiltrating ductal carcinoma of right breast ([address] 09/08/2016    DCIS (ductal carcinoma in situ) of breast 07/28/2016    Essential hypertension     Murmur, functional     Sleep apnea     Right upper quadrant abdominal mass 05/31/2016    Family history of diabetes mellitus in mother 05/31/2016    Obesity 05/31/2016    Vaginal atrophy 05/31/2016    Postmenopause 05/31/2016     Past Medical History:   Diagnosis Date    Anemia     no supplement currently     Anxiety     Arthritis     Breast cancer ([address] 2016    right     Ductal carcinoma in situ (DCIS) of right breast 7/2016    Enlarged uterus 5/31/2016    Hypertension     on med for control     Murmur, functional     diagnosed as teenager; \"functional murmur\" per pt.; last echo 09//26/18    Obesity 05/31/2016    BMI=33.1    Osteopenia     Paroxysmal atrial fibrillation ([address] 09/11/2001    followed by [facility]; on carvedilol     Psychiatric disorder     anxiety     Radiation therapy complication     Sleep apnea 09/11/2013    does not tolerate c-pap; instructed to bring dos.      Vaginal atrophy 5/31/2016      Past Surgical History:   Procedure Laterality Date    HX BREAST BIOPSY Right 8/26/2016    RIGHT BREAST NEEDLE LOCALIZED BIOPSY performed by Cathy Méndez MD     HX BREAST LUMPECTOMY Right 8/26/2016    RIGHT BREAST LUMPECTOMY performed by Cathy Méndez MD     HX BREAST LUMPECTOMY Right 9/23/2016    RIGHT BREAST LUMPECTOMY/PARTIAL REVISION  performed by Cathy Méndez    HX COLONOSCOPY  2007    diverticulosis    HX ORTHOPAEDIC      left wrist ORIF  HX TUBAL LIGATION      HX VASCULAR ACCESS      port has been removed     NELA BIOPSY BREAST STEREOTACTIC Right 7.19.16      [unfilled]  Allergies   Allergen Reactions    Pcn [Penicillins] Hives      Social History     Tobacco Use    Smoking status: Never Smoker    Smokeless tobacco: Never Used   Substance Use Topics    Alcohol use: Yes     Comment: very rare       Family History   Problem Relation Age of Onset    Diabetes Mother     Alzheimer Mother     No Known Problems Father     Breast Cancer Maternal Aunt       Review of Systems  A comprehensive review of systems was negative except for that written in the HPI. Objective:     Patient Vitals for the past 8 hrs:   BP Temp Pulse Resp SpO2 Height Weight   05/22/19 0906 (!) 188/100 97.9 °F (36.6 °C) 60 16 96 % 5' 3.5\" (1.613 m) 193 lb 8 oz (87.8 kg)     No intake or output data in the 24 hours ending 05/22/19 1047      General: Alert . Oriented x3   HEENT: Normocephalic PEERL   Heart: RRR   Lungs: Clear   Abdominal: Bowel sounds are normal, liver is not enlarged, spleen is not enlarged   Neurological: Alert and oriented X 3, normal strength and tone. Normal symmetric reflexes.  Normal coordination and gait   Extremities: extremities normal, atraumatic, no cyanosis or edema     Assessment:     Patient Active Problem List    Diagnosis Date Noted    SVT (supraventricular tachycardia) ([address] 12/14/2017    Osteopenia     High risk medication use 05/10/2017    Mixed hyperlipidemia 11/03/2016    Anxiety 10/03/2016    Essential tremor 10/03/2016    Infil[facility]ating ductal carcinoma of right [facility] [address] [facility] 09/08/2016    DCIS (ductal carcinoma in situ) of breast 07/28/2016    Essential hypertension     Murmur, functional     Sleep apnea     Right upper quadrant abdominal mass 05/31/2016    Family history of diabetes mellitus in mother 05/31/2016    Obesity 05/31/2016    Vaginal atrophy 05/31/2016    Postmenopause 05/31/2016   POstmenopaual bleeding    Plan:       Procedure(s):  DILATATION AND CURETTAGE HYSTEROSCOPY

## 2019-05-30 ENCOUNTER — HOSPITAL ENCOUNTER (OUTPATIENT)
Dept: RADIATION ONCOLOGY | Age: 64
Discharge: HOME | End: 2019-05-30
Payer: OTHER GOVERNMENT

## 2019-05-30 VITALS
TEMPERATURE: 98.2 F | BODY MASS INDEX: 33.08 KG/M2 | HEART RATE: 77 BPM | WEIGHT: 189.7 LBS | SYSTOLIC BLOOD PRESSURE: 155 MMHG | OXYGEN SATURATION: 98 % | RESPIRATION RATE: 16 BRPM | DIASTOLIC BLOOD PRESSURE: 93 MMHG

## 2019-05-30 PROCEDURE — 99211 OFF/OP EST MAY X REQ PHY/QHP: CPT

## 2019-05-30 NOTE — PROGRESS NOTES
Pt here today for FUP post RT to the right breast which ended 3/14/17. Pt is s/p a right lumpectomy 8/26/18. Pt is taking Tamoxifen as ordered and recently had a D&C for uterine thickening, pt stated she was told that \"everything is okay. \"  Pt's 5/20/19 Mammogram was negative. The 5/3/19 Dexa scan indicated osteopenia.

## 2019-05-30 NOTE — PROGRESS NOTES
Patient: Bry Moran MRN: [id number]  SSN: [id number]    YOB: 1955  Age: 59 y.o. Sex: female      Other Providers:  Sunitha Clayton MD    DIAGNOSIS: Right sided [id number] Stage IA invasive ductal carcinoma, two foci of disease, largest 0.7 cm.  92% ER, 7% NC, HER2 3+ positive    PREVIOUS TREATMENT:  1) 8/26/16:  Right breast lumpectomy   2) 9/23/16: Re-resection and SLN biopsy   3) Weekly paclitaxel + trastuzumab for 12 weeks, followed by Herceptin monotherapy to complete a year  4) Radiation of the right breast with 25 fractions of 5000 cGy, 5 boost of 1000 cGy. Treatment dates: 02/-1/17 through 03/14/17  5) AI, Arimidex: March 2017    HISTORY OF PRESENT ILLNESS:  Bry Moran is a 59 y.o. female initially seen initially 1/12/17 at the request of Sunitha Clayton MD.  She had a screening mammogram in June 2016 that showed a developing group of calcifications in the upper outer right breast. She completed biopsy which showed DCIS. MRI was also completed showing a 9 mm enhancing mass. She was referred for surgical management and underwent lumpectomy on 8/26/16. Pathology review surprisingly showed two foci of invasive ductal carcinoma in a background of DCIS. The larger of the two foci was 0.7 cm, and receptors showed 92% ER and 7% NC, but also showed HER2 3+ positive. She went back for sentinel node biopsy and reexcision, both of which were negative for residual carcinoma. She was referred to medical oncology for adjuvant recommendations and saw Dr. Sandra Parra who felt she would benefit from APT regimen with weekly paclitaxel + trastuzumab for 12 weeks, followed by Herceptin monotherapy to complete a year. She will also be receiving endocrine therapy with AI. She was seen by Dr. Sandra Parra prior to her final chemotherapy and noted nausea and fatigue from chemo but was manageable. No fevers, chills or other infectious symptoms. Neuropathy grade 1, stable and not interfering with ADLs.  Pain in the tips of her nails, but not interfering with ADLs. With the completion of chemo, she was referred for adjuvant radiation recommendations. INTERVAL HISTORY:  Ms Iraida Velez returns today for follow up, 26 months after completing radiation therapy for her right breast invasive ductal carcinoma. She tolerated radiation well. She denies any lingering side effects related to previous radiation of the breast. She was changed to Tamoxifen from Arimidex 2 months ago due to intolerance of Arimidex and is tolerating well. Good energy level. She did have some bleeding and thus workup included U/S then eventual D/C which was negative. OBSTETRIC HISTORY:    Menarche at the age of 14.    G3,P1. Oral Contraceptive Pills:  Around 15 years  Hormone Replacement Therapy:  Premarin in June and took only briefly    PAST MEDICAL HISTORY:    Past Medical History:   Diagnosis Date    Anemia     no supplement currently     Anxiety     Arthritis     Breast [facility] 2016    right     Ductal carcinoma in situ (DCIS) of right breast 7/2016    Enlarged uterus 5/31/2016    Hypertension     on med for control     Murmur, functional     diagnosed as teenager; \"functional murmur\" per pt.; last echo 09//26/18    Obesity 05/31/2016    BMI=33.1    Osteopenia     Paroxysmal atrial fibrillation ([address] 09/11/2001    followed by [facility]; on carvedilol     Psychiatric disorder     anxiety     Radiation therapy complication     Sleep apnea 09/11/2013    does not tolerate c-pap; instructed to bring dos.  Vaginal atrophy 5/31/2016     The patient denies history of collagen vascular diseases, pacemaker insertion, prior radiation or prior chemotherapy.      PAST SURGICAL HISTORY:   Past Surgical History:   Procedure Laterality Date    HX BREAST BIOPSY Right 8/26/2016    RIGHT BREAST NEEDLE LOCALIZED BIOPSY performed by Lizzy Lund MD     HX BREAST LUMPECTOMY Right 8/26/2016    RIGHT BREAST LUMPECTOMY performed by Thang Reece MD     HX BREAST LUMPECTOMY Right 9/23/2016    RIGHT BREAST LUMPECTOMY/PARTIAL REVISION  performed by Thang Reece    HX COLONOSCOPY  2007    diverticulosis    HX ORTHOPAEDIC      left wrist ORIF    HX TUBAL LIGATION      HX VASCULAR ACCESS      port has been removed     NELA BIOPSY BREAST STEREOTACTIC Right 7.19.16     MEDICATIONS:     Current Outpatient Medications:     diphenhydrAMINE (BENADRYL) 25 mg capsule, Take 25 mg by mouth every six (6) hours as needed for Itching., Disp: , Rfl:     carvedilol (COREG) 25 mg tablet, Take 1 Tab by mouth two (2) times daily (with meals). , Disp: 180 Tab, Rfl: 3    triamterene-hydroCHLOROthiazide (MAXZIDE) 37.5-25 mg per tablet, Take 1 Tab by mouth daily. , Disp: 90 Tab, Rfl: 3    LORazepam (ATIVAN) 0.5 mg tablet, Take 1 Tab by mouth daily as needed for Anxiety. Indications: anxious, Disp: 30 Tab, Rfl: 0    sertraline (ZOLOFT) 100 mg tablet, Take 50 mg by mouth every evening. Indications: Anxiousness associated with Depression, Disp: , Rfl:     ondansetron hcl (ZOFRAN) 8 mg tablet, Take 1 Tab by mouth every eight (8) hours as needed for Nausea., Disp: 90 Tab, Rfl: 5    tamoxifen (NOLVADEX) 20 mg tablet, Take 1 Tab by mouth daily. , Disp: 30 Tab, Rfl: 5    zolpidem (AMBIEN) 10 mg tablet, Take 1 Tab by mouth nightly as needed for Sleep. Max Daily Amount: 10 mg., Disp: 30 Tab, Rfl: 0    acetaminophen (TYLENOL ARTHRITIS PAIN) 650 mg TbER, Take 1,300 mg by mouth every eight (8) hours as needed for Pain. Take / use AM day of surgery  per anesthesia protocols if needed.   Indications: Pain, Disp: , Rfl:     ALLERGIES:   Allergies   Allergen Reactions    Pcn [Penicillins] Hives     SOCIAL HISTORY:   Social History     Socioeconomic History    Marital status:      Spouse name: Not on file    Number of children: Not on file    Years of education: Not on file    Highest education level: Not on file   Occupational History    Not on file Social Needs    Financial resource strain: Not on file    Food insecurity:     Worry: Not on file     Inability: Not on file    Transportation needs:     Medical: Not on file     Non-medical: Not on file   Tobacco Use    Smoking status: Never Smoker    Smokeless tobacco: Never Used   Substance and Sexual Activity    Alcohol use: Yes     Comment: very rare     Drug use: No    Sexual activity: Not on file   Lifestyle    Physical activity:     Days per week: Not on file     Minutes per session: Not on file    Stress: Not on file   Relationships    Social connections:     Talks on phone: Not on file     Gets together: Not on file     Attends [employer] witness service: Not on file     Active member of club or organization: Not on file     Attends meetings of clubs or organizations: Not on file     Relationship status: Not on file    Intimate partner violence:     Fear of current or ex partner: Not on file     Emotionally abused: Not on file     Physically abused: Not on file     Forced sexual activity: Not on file   Other Topics Concern    Not on file   Social History Narrative    Lives at home with , Estela Schwartz, and her mother. Homemaker. FAMILY HISTORY:   Family History   Problem Relation Age of Onset    Diabetes Mother    [facility] Alzheimer Mother     No Known Problems Father     Breast Cancer Maternal Aunt          PHYSICAL EXAMINATION:   ECOG Performance status 0  VITAL SIGNS:   Visit Vitals  BP (!) 155/93   Pulse 77   Temp 98.2 °F (36.8 °C)   Resp 16   Wt 86 kg (189 lb 11.2 oz)   SpO2 98%   BMI 33.08 kg/m²        GENERAL: The patient is well-developed, ambulatory, alert and in no acute distress. BREASTS: Examination of the unaffected breast reveals no dominant nodules or masses. The lumpectomy cavity appears well healed with good aesthetics and symmetry. Well healed surgical incision.      PATHOLOGY:    8/26/16:     DIAGNOSIS   RIGHT BREAST LOCALIZED WIDE EXCISION: TWO FOCI OF INFILTRATING DUCT CARCINOMA, LOW GRADE (WELL DIFFERENTIATED) IN A BACKGROUND OF MULTIFOCAL DUCTAL CARCINOMA IN-SITU, HIGH GRADE (CRIBRIFORM AND SOLID TYPE). ONE FOCUS OF INFILTRATING CARCINOMA IS APPROXIMATELY 0.7 X 0.4 CM ON SLIDE AND IS APPROXIMATELY 0.5 CM FROM MARKED ANTERIOR MARGIN. OTHER MARGINS ARE GREATER THAN 1 CM FROM THIS INFILTRATING TUMOR. SECOND FOCUS OF INFILTRATING CARCINOMA IS APPROXIMATELY 0.1 X 0.1 CM AND IS APPROXIMATELY 0.2 CM FROM MARKED POSTERIOR MARGIN. DUCTAL CARCINOMA IN-SITU IS PRESENT LESS THAN 0.1 CM FROM MARKED ANTERIOR MARGIN AND APPROXIMATELY 0.1 CM FROM MARKED POSTERIOR AND MEDIAL MARGINS. OTHER MARGINS ARE GREATER THAN 1 CM FROM TUMOR (SEE [id number]). Interpretation   Clarient Diagnostic Result:   TEST NAME: RESULTS: INTERNAL CONTROLS:   ESTROGEN RECEPTOR: Positive (92%) Present and ER positive   PROGESTERONE RECEPTOR: Positive (7%) Present and TX positive   HER-2/JAGDISH: Positive (3+) Percent of cells with 3+ stain: 38%      ANATOMIC SITE: Right breast (presumably 10 o'clock position based on prior core biopsy). PROCEDURE: Needle localized wide excision. HISTOLOGIC TYPE: Infiltrating duct carcinoma. SIZE: Two foci, one approximately 0.7 x 0.4 cm and an apparent separate focus approximately 0.1 x 0.1 cm. UNIFOCAL OR MULTIFOCAL: Apparently multifocal.   PRESENCE OF SKIN, NIPPLE OR SKELETAL MUSCLE INVOLVEMENT: Not identified. ZARA MODIFICATION OF BLOOM-KAPADIA GRADE:   ARCHITECTURAL SCORE: 2/3. NUCLEAR SCORE: 2/3. MITOTIC SCORE: 1/3. TOTAL SCORE: 5/9 = Low Grade for both tumors. IN-SITU COMPONENT: Multifocal ductal carcinoma in-situ is noted, high grade (cribriform and solid type). LYMPHOVASCULAR INVASION: Not identified. ARE MICROCALCIFICATIONS IDENTIFIED: Within ductal carcinoma in-situ. TUMOR DISTANCE TO CLOSEST MARGIN: Ductal carcinoma in-situ is present less than 0.1 cm from marked anterior margin and approximately 0.1 cm from marked medial margin.  Small focus of infiltrating carcinoma is present approximately 0.2 cm from marked deep margin. ANTERIOR (SUPERFICIAL): Ductal carcinoma in-situ is present less than 0.1 cm from marked anterior margin and larger focus of infiltrating carcinoma is present approximately 0.5 cm from marked anterior margin. POSTERIOR (DEEP): Focus of ductal carcinoma in-situ is present approximately 0.1 cm from marked posterior margin and small focus of infiltrating carcinoma is present approximately 0.2 cm from marked posterior margin. MEDIAL: Focus of ductal carcinoma in-situ is present approximately 0.1 cm from marked medial margin. LATERAL: Greater than 1 cm. INFERIOR: Greater than 1 cm. SUPERIOR: Greater than 1 cm. LYMPH NODE STATUS: Does not apply. TOTAL NUMBER OF LYMPH NODES CONTAINING CARCINOMA: Does not apply. TOTAL NUMBER OF LYMPH NODES EXAMINED: Does not apply. METASTASIS: Does not apply. SIZE OF LARGEST POSITIVE NODE: Does not apply. EXTRA-CAPSULAR EXTENSION OF TUMOR: Does not apply. OTHER FINDINGS: Changes consistent with prior biopsy site. TNM CLASSIFICATION: pT1 pNX.     9/23/16:   DIAGNOSIS   A: POST LUMPECTOMY MARGIN RIGHT BREAST:   BENIGN BREAST TISSUE WITH FIBROCYSTIC MASTOPATHY AND FAT NECROSIS. NO RESIDUAL CARCINOMA IS IDENTIFIED. B: SENTINEL NODE RIGHT BREAST:   ONE LYMPH NODE NEGATIVE FOR METASTATIC     LABORATORY: none today    RADIOLOGY:    I have personally reviewed the imaging and agree with the reports below. Dexa Bone Density Study Axial    Result Date: 5/3/2019  BONE DENSITOMETRY:                     CLINICAL HISTORY:  Postmenopausal screening on tamoxifen after breast cancer. COMPARISON: April 6, 2017. FINDINGS:  Density in the lumbar spine at L1-4 is 1.226 grams per square centimeter with a T-score of 0.3, which represents interval decrease of 1% from 1.238.   Minimal density at the hips today is measured at the left femoral neck at 0.884 grams per square centimeter with a T-score of -1.1.  Total mean density at the hips is 0.948 grams per square centimeter with a T-score of -0.5, which represents minimal decrease from 0.951. The FRAX index gives a 10-year risk of any osteoporotic fracture of 23% and of a hip fracture of 1 %. IMPRESSION:  Osteopenia with minimal progressive density loss from April 2017 but significant risk of a major osteoporotic fracture in the next decade (see above). Followup scan should be considered in two years, as clinically indicated. Mri Breast Bi W Wo Cont    Result Date: 3/5/2019  MRI BILATERAL BREASTS WITHOUT AND WITH CONTRAST, 3/5/2019. CLINICAL HISTORY:  History of right breast cancer in 2016. Itching feeling in right axillary area which still persists. History of chemotherapy and radiation in 2017. Postmenopausal. Family history of breast cancer. Technique: Multiplanar multisequence imaging of the breast were performed prior to and following the uneventful intravenous administration of 18 mL of Magnevist. Postcontrast imaging was performed using a dynamic technique. Images were reviewed using the PhoneAndPhone. Sequences obtained: Sagittal T2, axial STIR, axial T1, and axial fat-saturated T1-weighted images prior to and following administration of contrast. Comparison studies: Breast MRI 9/12/2018, and bilateral mammogram 5/16/2018. FINDINGS: The breasts are composed of heterogeneous fibroglandular elements. No enlarged axillary lymph nodes are seen. No enlarged internal mammary lymph nodes are seen. Evaluation of the lungs is limited by  MRI imaging. However, no obvious pulmonary masses are seen. No significant pleural effusions are seen. The liver is obscured by cardiac motion artifact and only partially visualized. Grossly stable scattered T2 hyperintense hepatic lesions are seen which are not strongly concerning given their stability possibly representing small benign cysts.   Following the administration of intravenous contrast, normal enhancement is seen of the heart and vascular structures of the breasts. Mild background physiologic enhancement is seen. No evolving enhancing masses, or worrisome patterns of enhancement are seen to suggest breast malignancy. Faint enhancing nodules are seen in the left breast Architectural changes are seen in the outer right breast best appreciated on postcontrast image 266 consistent with prior lumpectomy. No abnormal enhancement is seen at this level to suggest residual local recurrence. No evidence of skin thickening, or nipple retraction is seen. No enhancing osseous abnormality is seen. IMPRESSION: 1. Post lumpectomy changes of the right breast without worrisome enhancement. No worrisome enhancing masses or worrisome patterns of enhancement are otherwise seen of either breast to suggest potential malignancy. Therefore, routine follow-up is recommended with the patient's next bilateral mammogram to occur in May 2019 unless clinically indicated sooner. A reminder letter will be scheduled. Continued inclusion of annual screening breast MRI can be considered in this relatively high risk patient. Given the heterogeneously dense appearance of her breasts, this patient may benefit from this additional modality given that this could obscure a subtle lesion. BI-RADS Assessment Category 2: Benign finding(s). Us Transvaginal    Gyn Report Count includes the [facility] Page  [facility] Patient / Exam Information Date of Exam: 2019 Name: Randi Green. Phys: Dr. Prasanna Bingham MD Patient ID: [id number] : 1955 Ref. Phys: Age: 59 Sonographer: Parish Llanos RDMS Indication: pmb Sex: Female Exam Type: TV GYN LMP LMP Day of Cycle  AB Day of stim. Expected Ovul. Para Ectopic 2D Measurements Value m1 m2 m3 m4 m5 m6 Meth. Uterus Length 7.92 cm 7.92 avg. Width 5.31 cm 5.31 avg. Height 3.45 cm 3.45 avg. Volume 75.969 cm³ 75.969 Endo. Thickness 5.05 mm 5.32 4.78 avg. Left Ovary Length 1.43 cm 1.43 avg.  Width 1.04 cm 1.04 avg. Height 0.92 cm 0.92 avg. Volume 0.716 cm³ 0.716 Right Ovary Length 1.68 cm 1.68 avg. Width 1.71 cm 1.71 avg. Height 1.08 cm 1.08 avg. Volume 1.625 cm³ 1.625 Comment [id number]---gdf Uterus appears normal Endo = 5.3mm and appears thick and inhomogeneous. Patient is taking tamoxifen. Rt ovy appears normal Lt ovy appears normal No free fluid seen Date: 05/07/2019 Perf. Physician: Dr. Kalani Tilley MD Sonographer: Mary Alice Verduzco RDMS    [facility] Mammo Bi Screening Incl Cad    Result Date: 5/21/2019  BILATERAL SCREENING DIGITAL MAMMOGRAPHY: CLINICAL HISTORY:  Followup right lumpectomy. TECHNIQUE:  Craniocaudal and mediolateral oblique views of both breasts as well as straight lateral view on the right were performed and are interpreted in conjunction with a computer assisted detection (CAD) system. COMPARISON:  Priors dating from June 24, 2016 bleeding right wire localization of August 26, 2016 and MRI of March 5, 2019. FINDINGS:  Right lumpectomy bed appears unchanged. There is heterogeneously dense fibroglandular tissue in a fairly symmetric pattern elsewhere bilaterally. No new or enlarging soft tissue mass, suspicious calcifications, or other definite evidence for malignancy is seen. No significant change is seen from prior study. IMPRESSION:     1. STABLE POST-TREATMENT APPEARANCE OF THE RIGHT BREAST WITH NO SPECIFIC MAMMOGRAPHIC EVIDENCE FOR MALIGNANCY ON EITHER SIDE. FOLLOW-UP BILATERAL SCREENING MAMMOGRAPHY IS RECOMMENDED IN ONE YEAR. 2.  HIGH-RISK SCREENING BREAST MRI SHOULD ALSO BE CONSIDERED AT THAT TIME IN FOLLOWUP OF THE PRIOR MRI IN [facility] 2019 FOR THIS PATIENT WITH HETEROGENEOUSLY DENSE BREAST PARENCHYMA AND A PERSONAL HISTORY OF BREAST CANCER. BI-RADS Assessment Category 2: Benign finding. A reminder letter will be scheduled.  BD3 Information about breast density will be included with the letter sent to the patient with her mammogram results.        IMPRESSION:  Anshu Nolen is a 59 y.o. female with right sided [id number] Stage IA invasive ductal carcinoma, two foci of disease, largest 0.7 cm, 92% ER, 7% CO, HER2 3+ positive S/P right breast lumpectomy, 08/26/16 F/B re-resection and SLN biopsy, 09/23/16. She completed weekly paclitaxel + trastuzumab for 12 weeks, and 1 year of Herceptin in November 2017. She completed radiation therapy 03/14/17. Negative mammogram, 5/2019. Ms Anupama Luis has recovered asanticipated from her previous radiation therapy. Recent mammogram, 5/2019, was negative for malignancy - recommending bilateral annual mammogram.  Breast MRI to be dictated by medical oncology. PLAN:   1) Annual mammography with next mammogram 5/2020  2) Encouraged SBE  3) Again advised to resume calcium and vitamin D   4) Tamoxifen under the direction of medical oncology, Dr. Heather Turpin  5) Follow up 12 months     Brendon Rich MD   May 30, 2019       Portions of this note were copied from prior encounters and reviewed for accuracy, currency, and represent documentation and tasks completed during this encounter. I verify and attest these portions to be unchanged from prior visits.

## 2019-08-28 ENCOUNTER — HOSPITAL ENCOUNTER (OUTPATIENT)
Dept: LAB | Age: 64
Discharge: HOME | End: 2019-08-28

## 2019-08-28 PROCEDURE — 88305 TISSUE EXAM BY PATHOLOGIST: CPT

## 2019-09-30 ENCOUNTER — HOSPITAL ENCOUNTER (OUTPATIENT)
Dept: LAB | Age: 64
Discharge: HOME | End: 2019-09-30
Payer: OTHER GOVERNMENT

## 2019-09-30 DIAGNOSIS — C50.911 MALIGNANT NEOPLASM OF RIGHT BREAST IN FEMALE, ESTROGEN RECEPTOR POSITIVE, UNSPECIFIED SITE OF BREAST (HCC): ICD-10-CM

## 2019-09-30 DIAGNOSIS — Z17.0 MALIGNANT NEOPLASM OF RIGHT BREAST IN FEMALE, ESTROGEN RECEPTOR POSITIVE, UNSPECIFIED SITE OF BREAST (HCC): ICD-10-CM

## 2019-09-30 LAB
ALBUMIN SERPL-MCNC: 3.8 G/DL (ref 3.2–4.6)
ALBUMIN/GLOB SERPL: 0.9 {RATIO} (ref 1.2–3.5)
ALP SERPL-CCNC: 81 U/L (ref 50–136)
ALT SERPL-CCNC: 21 U/L (ref 12–65)
ANION GAP SERPL CALC-SCNC: 9 MMOL/L (ref 7–16)
AST SERPL-CCNC: 12 U/L (ref 15–37)
BASOPHILS # BLD: 0 K/UL (ref 0–0.2)
BASOPHILS NFR BLD: 1 % (ref 0–2)
BILIRUB SERPL-MCNC: 0.4 MG/DL (ref 0.2–1.1)
BUN SERPL-MCNC: 13 MG/DL (ref 8–23)
CALCIUM SERPL-MCNC: 9.9 MG/DL (ref 8.3–10.4)
CHLORIDE SERPL-SCNC: 108 MMOL/L (ref 98–107)
CO2 SERPL-SCNC: 25 MMOL/L (ref 21–32)
CREAT SERPL-MCNC: 0.7 MG/DL (ref 0.6–1)
DIFFERENTIAL METHOD BLD: ABNORMAL
EOSINOPHIL # BLD: 0.1 K/UL (ref 0–0.8)
EOSINOPHIL NFR BLD: 2 % (ref 0.5–7.8)
ERYTHROCYTE [DISTWIDTH] IN BLOOD BY AUTOMATED COUNT: 13 % (ref 11.9–14.6)
GLOBULIN SER CALC-MCNC: 4.4 G/DL (ref 2.3–3.5)
GLUCOSE SERPL-MCNC: 107 MG/DL (ref 65–100)
HCT VFR BLD AUTO: 36.6 % (ref 35.8–46.3)
HGB BLD-MCNC: 11.8 G/DL (ref 11.7–15.4)
IMM GRANULOCYTES # BLD AUTO: 0 K/UL (ref 0–0.5)
IMM GRANULOCYTES NFR BLD AUTO: 0 % (ref 0–5)
LYMPHOCYTES # BLD: 1.5 K/UL (ref 0.5–4.6)
LYMPHOCYTES NFR BLD: 38 % (ref 13–44)
MCH RBC QN AUTO: 29.9 PG (ref 26.1–32.9)
MCHC RBC AUTO-ENTMCNC: 32.2 G/DL (ref 31.4–35)
MCV RBC AUTO: 92.7 FL (ref 79.6–97.8)
MONOCYTES # BLD: 0.4 K/UL (ref 0.1–1.3)
MONOCYTES NFR BLD: 9 % (ref 4–12)
NEUTS SEG # BLD: 1.9 K/UL (ref 1.7–8.2)
NEUTS SEG NFR BLD: 50 % (ref 43–78)
NRBC # BLD: 0 K/UL (ref 0–0.2)
PLATELET # BLD AUTO: 288 K/UL (ref 150–450)
PMV BLD AUTO: 9.3 FL (ref 9.4–12.3)
POTASSIUM SERPL-SCNC: 3.9 MMOL/L (ref 3.5–5.1)
PROT SERPL-MCNC: 8.2 G/DL (ref 6.3–8.2)
RBC # BLD AUTO: 3.95 M/UL (ref 4.05–5.25)
SODIUM SERPL-SCNC: 142 MMOL/L (ref 136–145)
WBC # BLD AUTO: 3.9 K/UL (ref 4.3–11.1)

## 2019-09-30 PROCEDURE — 85025 COMPLETE CBC W/AUTO DIFF WBC: CPT

## 2019-09-30 PROCEDURE — 36415 COLL VENOUS BLD VENIPUNCTURE: CPT

## 2019-09-30 PROCEDURE — 80053 COMPREHEN METABOLIC PANEL: CPT

## 2019-10-10 ENCOUNTER — HOSPITAL ENCOUNTER (OUTPATIENT)
Dept: MRI IMAGING | Age: 64
Discharge: HOME | End: 2019-10-10
Attending: NURSE PRACTITIONER
Payer: OTHER GOVERNMENT

## 2019-10-10 DIAGNOSIS — Z12.39 BREAST CANCER SCREENING, HIGH RISK PATIENT: ICD-10-CM

## 2019-10-10 DIAGNOSIS — N63.10 LUMP OF RIGHT BREAST: ICD-10-CM

## 2019-10-10 DIAGNOSIS — C50.911 INFILTRATING DUCTAL CARCINOMA OF RIGHT BREAST (HCC): ICD-10-CM

## 2019-10-10 PROCEDURE — 74011000258 HC RX REV CODE- 258: Performed by: NURSE PRACTITIONER

## 2019-10-10 PROCEDURE — 74011250636 HC RX REV CODE- 250/636: Performed by: NURSE PRACTITIONER

## 2019-10-10 PROCEDURE — A9575 INJ GADOTERATE MEGLUMI 0.1ML: HCPCS | Performed by: NURSE PRACTITIONER

## 2019-10-10 PROCEDURE — 77049 MRI BREAST C-+ W/CAD BI: CPT

## 2019-10-10 RX ORDER — GADOTERATE MEGLUMINE 376.9 MG/ML
20 INJECTION INTRAVENOUS
Status: COMPLETED | OUTPATIENT
Start: 2019-10-10 | End: 2019-10-10

## 2019-10-10 RX ORDER — SODIUM CHLORIDE 0.9 % (FLUSH) 0.9 %
10 SYRINGE (ML) INJECTION
Status: COMPLETED | OUTPATIENT
Start: 2019-10-10 | End: 2019-10-10

## 2019-10-10 RX ADMIN — GADOTERATE MEGLUMINE 18 ML: 376.9 INJECTION INTRAVENOUS at 12:03

## 2019-10-10 RX ADMIN — Medication 10 ML: at 12:04

## 2019-10-10 RX ADMIN — SODIUM CHLORIDE 100 ML: 900 INJECTION, SOLUTION INTRAVENOUS at 12:04

## 2020-05-28 ENCOUNTER — APPOINTMENT (OUTPATIENT)
Dept: RADIATION ONCOLOGY | Age: 65
End: 2020-05-28

## 2020-06-28 ENCOUNTER — HOSPITAL ENCOUNTER (OUTPATIENT)
Dept: MAMMOGRAPHY | Age: 65
Discharge: HOME | End: 2020-06-28
Attending: INTERNAL MEDICINE
Payer: OTHER GOVERNMENT

## 2020-06-28 DIAGNOSIS — Z12.31 SCREENING MAMMOGRAM FOR HIGH-RISK PATIENT: ICD-10-CM

## 2020-06-28 PROCEDURE — 77067 SCR MAMMO BI INCL CAD: CPT

## 2020-08-03 ENCOUNTER — HOSPITAL ENCOUNTER (OUTPATIENT)
Dept: LAB | Age: 65
Discharge: HOME | End: 2020-08-03
Payer: OTHER GOVERNMENT

## 2020-08-03 DIAGNOSIS — C50.911 INFILTRATING DUCTAL CARCINOMA OF RIGHT BREAST (HCC): ICD-10-CM

## 2020-08-03 LAB
ALBUMIN SERPL-MCNC: 4 G/DL (ref 3.2–4.6)
ALBUMIN/GLOB SERPL: 1 {RATIO} (ref 1.2–3.5)
ALP SERPL-CCNC: 81 U/L (ref 50–136)
ALT SERPL-CCNC: 18 U/L (ref 12–65)
ANION GAP SERPL CALC-SCNC: 4 MMOL/L (ref 7–16)
AST SERPL-CCNC: 15 U/L (ref 15–37)
BASOPHILS # BLD: 0 K/UL (ref 0–0.2)
BASOPHILS NFR BLD: 1 % (ref 0–2)
BILIRUB SERPL-MCNC: 0.5 MG/DL (ref 0.2–1.1)
BUN SERPL-MCNC: 16 MG/DL (ref 8–23)
CALCIUM SERPL-MCNC: 9.1 MG/DL (ref 8.3–10.4)
CHLORIDE SERPL-SCNC: 109 MMOL/L (ref 98–107)
CO2 SERPL-SCNC: 27 MMOL/L (ref 21–32)
CREAT SERPL-MCNC: 0.8 MG/DL (ref 0.6–1)
DIFFERENTIAL METHOD BLD: ABNORMAL
EOSINOPHIL # BLD: 0.1 K/UL (ref 0–0.8)
EOSINOPHIL NFR BLD: 3 % (ref 0.5–7.8)
ERYTHROCYTE [DISTWIDTH] IN BLOOD BY AUTOMATED COUNT: 13.3 % (ref 11.9–14.6)
GLOBULIN SER CALC-MCNC: 4.1 G/DL (ref 2.3–3.5)
GLUCOSE SERPL-MCNC: 95 MG/DL (ref 65–100)
HCT VFR BLD AUTO: 35.4 % (ref 35.8–46.3)
HGB BLD-MCNC: 11.6 G/DL (ref 11.7–15.4)
IMM GRANULOCYTES # BLD AUTO: 0 K/UL (ref 0–0.5)
IMM GRANULOCYTES NFR BLD AUTO: 0 % (ref 0–5)
LYMPHOCYTES # BLD: 1.7 K/UL (ref 0.5–4.6)
LYMPHOCYTES NFR BLD: 41 % (ref 13–44)
MCH RBC QN AUTO: 30.1 PG (ref 26.1–32.9)
MCHC RBC AUTO-ENTMCNC: 32.8 G/DL (ref 31.4–35)
MCV RBC AUTO: 91.7 FL (ref 79.6–97.8)
MONOCYTES # BLD: 0.4 K/UL (ref 0.1–1.3)
MONOCYTES NFR BLD: 9 % (ref 4–12)
NEUTS SEG # BLD: 2 K/UL (ref 1.7–8.2)
NEUTS SEG NFR BLD: 46 % (ref 43–78)
NRBC # BLD: 0 K/UL (ref 0–0.2)
PLATELET # BLD AUTO: 269 K/UL (ref 150–450)
PMV BLD AUTO: 9.2 FL (ref 9.4–12.3)
POTASSIUM SERPL-SCNC: 3.9 MMOL/L (ref 3.5–5.1)
PROT SERPL-MCNC: 8.1 G/DL (ref 6.3–8.2)
RBC # BLD AUTO: 3.86 M/UL (ref 4.05–5.25)
SODIUM SERPL-SCNC: 140 MMOL/L (ref 136–145)
WBC # BLD AUTO: 4.3 K/UL (ref 4.3–11.1)

## 2020-08-03 PROCEDURE — 85025 COMPLETE CBC W/AUTO DIFF WBC: CPT

## 2020-08-03 PROCEDURE — 36415 COLL VENOUS BLD VENIPUNCTURE: CPT

## 2020-08-03 PROCEDURE — 80053 COMPREHEN METABOLIC PANEL: CPT

## 2021-02-03 ENCOUNTER — HOSPITAL ENCOUNTER (OUTPATIENT)
Dept: LAB | Age: 66
Discharge: HOME | End: 2021-02-03
Payer: OTHER GOVERNMENT

## 2021-02-03 DIAGNOSIS — C50.911 INFILTRATING DUCTAL CARCINOMA OF RIGHT BREAST (HCC): ICD-10-CM

## 2021-02-03 LAB
ALBUMIN SERPL-MCNC: 3.9 G/DL (ref 3.2–4.6)
ALBUMIN/GLOB SERPL: 0.9 {RATIO} (ref 1.2–3.5)
ALP SERPL-CCNC: 62 U/L (ref 50–136)
ALT SERPL-CCNC: 17 U/L (ref 12–65)
ANION GAP SERPL CALC-SCNC: 5 MMOL/L (ref 7–16)
AST SERPL-CCNC: 14 U/L (ref 15–37)
BASOPHILS # BLD: 0 K/UL (ref 0–0.2)
BASOPHILS NFR BLD: 1 % (ref 0–2)
BILIRUB SERPL-MCNC: 0.4 MG/DL (ref 0.2–1.1)
BUN SERPL-MCNC: 19 MG/DL (ref 8–23)
CALCIUM SERPL-MCNC: 9.3 MG/DL (ref 8.3–10.4)
CHLORIDE SERPL-SCNC: 107 MMOL/L (ref 98–107)
CO2 SERPL-SCNC: 27 MMOL/L (ref 21–32)
CREAT SERPL-MCNC: 0.9 MG/DL (ref 0.6–1)
DIFFERENTIAL METHOD BLD: ABNORMAL
EOSINOPHIL # BLD: 0.1 K/UL (ref 0–0.8)
EOSINOPHIL NFR BLD: 2 % (ref 0.5–7.8)
ERYTHROCYTE [DISTWIDTH] IN BLOOD BY AUTOMATED COUNT: 13.2 % (ref 11.9–14.6)
GLOBULIN SER CALC-MCNC: 4.2 G/DL (ref 2.3–3.5)
GLUCOSE SERPL-MCNC: 111 MG/DL (ref 65–100)
HCT VFR BLD AUTO: 35.1 % (ref 35.8–46.3)
HGB BLD-MCNC: 11.5 G/DL (ref 11.7–15.4)
IMM GRANULOCYTES # BLD AUTO: 0 K/UL (ref 0–0.5)
IMM GRANULOCYTES NFR BLD AUTO: 0 % (ref 0–5)
LYMPHOCYTES # BLD: 1.9 K/UL (ref 0.5–4.6)
LYMPHOCYTES NFR BLD: 45 % (ref 13–44)
MCH RBC QN AUTO: 29.9 PG (ref 26.1–32.9)
MCHC RBC AUTO-ENTMCNC: 32.8 G/DL (ref 31.4–35)
MCV RBC AUTO: 91.4 FL (ref 79.6–97.8)
MONOCYTES # BLD: 0.5 K/UL (ref 0.1–1.3)
MONOCYTES NFR BLD: 11 % (ref 4–12)
NEUTS SEG # BLD: 1.8 K/UL (ref 1.7–8.2)
NEUTS SEG NFR BLD: 41 % (ref 43–78)
NRBC # BLD: 0 K/UL (ref 0–0.2)
PLATELET # BLD AUTO: 257 K/UL (ref 150–450)
PMV BLD AUTO: 9.2 FL (ref 9.4–12.3)
POTASSIUM SERPL-SCNC: 3.8 MMOL/L (ref 3.5–5.1)
PROT SERPL-MCNC: 8.1 G/DL (ref 6.3–8.2)
RBC # BLD AUTO: 3.84 M/UL (ref 4.05–5.25)
SODIUM SERPL-SCNC: 139 MMOL/L (ref 136–145)
WBC # BLD AUTO: 4.3 K/UL (ref 4.3–11.1)

## 2021-02-03 PROCEDURE — 85025 COMPLETE CBC W/AUTO DIFF WBC: CPT

## 2021-02-03 PROCEDURE — 80053 COMPREHEN METABOLIC PANEL: CPT

## 2021-02-03 PROCEDURE — 36415 COLL VENOUS BLD VENIPUNCTURE: CPT

## 2021-02-05 ENCOUNTER — HOSPITAL ENCOUNTER (OUTPATIENT)
Dept: CT IMAGING | Age: 66
Discharge: HOME | End: 2021-02-05
Attending: OBSTETRICS & GYNECOLOGY
Payer: OTHER GOVERNMENT

## 2021-02-05 DIAGNOSIS — R93.89 THICKENED ENDOMETRIUM: ICD-10-CM

## 2021-02-05 DIAGNOSIS — Z92.29 HX OF TAMOXIFEN THERAPY: ICD-10-CM

## 2021-02-05 DIAGNOSIS — R10.2 PELVIC CRAMPING: ICD-10-CM

## 2021-02-05 PROCEDURE — 74177 CT ABD & PELVIS W/CONTRAST: CPT

## 2021-02-05 PROCEDURE — 74011000636 HC RX REV CODE- 636: Performed by: OBSTETRICS & GYNECOLOGY

## 2021-02-05 PROCEDURE — 74011000258 HC RX REV CODE- 258: Performed by: OBSTETRICS & GYNECOLOGY

## 2021-02-05 RX ORDER — SODIUM CHLORIDE 0.9 % (FLUSH) 0.9 %
10 SYRINGE (ML) INJECTION
Status: COMPLETED | OUTPATIENT
Start: 2021-02-05 | End: 2021-02-05

## 2021-02-05 RX ADMIN — Medication 10 ML: at 13:25

## 2021-02-05 RX ADMIN — IOPAMIDOL 100 ML: 755 INJECTION, SOLUTION INTRAVENOUS at 13:24

## 2021-02-05 RX ADMIN — SODIUM CHLORIDE 100 ML: 900 INJECTION, SOLUTION INTRAVENOUS at 13:25

## 2021-02-05 RX ADMIN — DIATRIZOATE MEGLUMINE AND DIATRIZOATE SODIUM 15 ML: 660; 100 LIQUID ORAL; RECTAL at 13:25

## 2021-02-07 NOTE — PROGRESS NOTES
CT scan shows diverticulosis which could account for the cramping. Endometrium 4.4 mm which is slightly thick. She just had D&C in 2019 with benign pathology  Recommend we repeat US in 3 months.   If she has any bleeding she needs to let us know

## 2021-04-13 ENCOUNTER — APPOINTMENT (OUTPATIENT)
Dept: GENERAL RADIOLOGY | Age: 66
End: 2021-04-13
Attending: EMERGENCY MEDICINE
Payer: OTHER GOVERNMENT

## 2021-04-13 ENCOUNTER — HOSPITAL ENCOUNTER (EMERGENCY)
Age: 66
Discharge: HOME | End: 2021-04-13
Attending: EMERGENCY MEDICINE
Payer: OTHER GOVERNMENT

## 2021-04-13 VITALS
OXYGEN SATURATION: 95 % | SYSTOLIC BLOOD PRESSURE: 197 MMHG | WEIGHT: 190 LBS | HEART RATE: 82 BPM | DIASTOLIC BLOOD PRESSURE: 87 MMHG | RESPIRATION RATE: 18 BRPM | BODY MASS INDEX: 32.44 KG/M2 | TEMPERATURE: 98.4 F | HEIGHT: 64 IN

## 2021-04-13 DIAGNOSIS — R07.9 CHEST PAIN, UNSPECIFIED TYPE: ICD-10-CM

## 2021-04-13 DIAGNOSIS — I10 HYPERTENSION, UNSPECIFIED TYPE: Primary | ICD-10-CM

## 2021-04-13 LAB
ALBUMIN SERPL-MCNC: 4 G/DL (ref 3.2–4.6)
ALBUMIN/GLOB SERPL: 0.9 {RATIO} (ref 1.2–3.5)
ALP SERPL-CCNC: 80 U/L (ref 50–136)
ALT SERPL-CCNC: 22 U/L (ref 12–65)
ANION GAP SERPL CALC-SCNC: 7 MMOL/L (ref 7–16)
AST SERPL-CCNC: 11 U/L (ref 15–37)
BASOPHILS # BLD: 0 K/UL (ref 0–0.2)
BASOPHILS NFR BLD: 0 % (ref 0–2)
BILIRUB SERPL-MCNC: 0.3 MG/DL (ref 0.2–1.1)
BUN SERPL-MCNC: 18 MG/DL (ref 8–23)
CALCIUM SERPL-MCNC: 9.6 MG/DL (ref 8.3–10.4)
CHLORIDE SERPL-SCNC: 107 MMOL/L (ref 98–107)
CO2 SERPL-SCNC: 26 MMOL/L (ref 21–32)
CREAT SERPL-MCNC: 0.78 MG/DL (ref 0.6–1)
DIFFERENTIAL METHOD BLD: ABNORMAL
EOSINOPHIL # BLD: 0.1 K/UL (ref 0–0.8)
EOSINOPHIL NFR BLD: 2 % (ref 0.5–7.8)
ERYTHROCYTE [DISTWIDTH] IN BLOOD BY AUTOMATED COUNT: 13.2 % (ref 11.9–14.6)
GLOBULIN SER CALC-MCNC: 4.6 G/DL (ref 2.3–3.5)
GLUCOSE SERPL-MCNC: 93 MG/DL (ref 65–100)
HCT VFR BLD AUTO: 37 % (ref 35.8–46.3)
HGB BLD-MCNC: 12.2 G/DL (ref 11.7–15.4)
IMM GRANULOCYTES # BLD AUTO: 0 K/UL (ref 0–0.5)
IMM GRANULOCYTES NFR BLD AUTO: 0 % (ref 0–5)
LIPASE SERPL-CCNC: 88 U/L (ref 73–393)
LYMPHOCYTES # BLD: 2.2 K/UL (ref 0.5–4.6)
LYMPHOCYTES NFR BLD: 44 % (ref 13–44)
MAGNESIUM SERPL-MCNC: 2.2 MG/DL (ref 1.8–2.4)
MCH RBC QN AUTO: 30.4 PG (ref 26.1–32.9)
MCHC RBC AUTO-ENTMCNC: 33 G/DL (ref 31.4–35)
MCV RBC AUTO: 92.3 FL (ref 79.6–97.8)
MONOCYTES # BLD: 0.6 K/UL (ref 0.1–1.3)
MONOCYTES NFR BLD: 11 % (ref 4–12)
NEUTS SEG # BLD: 2.1 K/UL (ref 1.7–8.2)
NEUTS SEG NFR BLD: 42 % (ref 43–78)
NRBC # BLD: 0 K/UL (ref 0–0.2)
PLATELET # BLD AUTO: 304 K/UL (ref 150–450)
PMV BLD AUTO: 9.4 FL (ref 9.4–12.3)
POTASSIUM SERPL-SCNC: 3.5 MMOL/L (ref 3.5–5.1)
PROT SERPL-MCNC: 8.6 G/DL (ref 6.3–8.2)
RBC # BLD AUTO: 4.01 M/UL (ref 4.05–5.2)
SODIUM SERPL-SCNC: 140 MMOL/L (ref 136–145)
TROPONIN-HIGH SENSITIVITY: 11 PG/ML (ref 0–14)
TROPONIN-HIGH SENSITIVITY: 9.1 PG/ML (ref 0–14)
WBC # BLD AUTO: 5.1 K/UL (ref 4.3–11.1)

## 2021-04-13 PROCEDURE — 83690 ASSAY OF LIPASE: CPT

## 2021-04-13 PROCEDURE — 71046 X-RAY EXAM CHEST 2 VIEWS: CPT

## 2021-04-13 PROCEDURE — 83735 ASSAY OF MAGNESIUM: CPT

## 2021-04-13 PROCEDURE — 85025 COMPLETE CBC W/AUTO DIFF WBC: CPT

## 2021-04-13 PROCEDURE — 80053 COMPREHEN METABOLIC PANEL: CPT

## 2021-04-13 PROCEDURE — 93005 ELECTROCARDIOGRAM TRACING: CPT | Performed by: EMERGENCY MEDICINE

## 2021-04-13 PROCEDURE — 74011250637 HC RX REV CODE- 250/637: Performed by: EMERGENCY MEDICINE

## 2021-04-13 PROCEDURE — 99284 EMERGENCY DEPT VISIT MOD MDM: CPT

## 2021-04-13 PROCEDURE — 84484 ASSAY OF TROPONIN QUANT: CPT

## 2021-04-13 RX ORDER — CLONIDINE HYDROCHLORIDE 0.1 MG/1
0.2 TABLET ORAL
Status: COMPLETED | OUTPATIENT
Start: 2021-04-13 | End: 2021-04-13

## 2021-04-13 RX ADMIN — CLONIDINE HYDROCHLORIDE 0.2 MG: 0.1 TABLET ORAL at 13:50

## 2021-04-13 NOTE — ED TRIAGE NOTES
Patient arrives ambulatory to triage with mask in place. Patient reports deep pain in chest that radiated to her back last week. Today she went to get second moderna vaccine and complained of chest pain and was sent to ER. Patient reports having covid at end of January and has reported intermittent chest pain since then. Patient reports frequent palpitations. Patient reports shortness of breath that has worsened since receiving first moderna vaccine. Reports hypertension and did not take home medication.

## 2021-04-13 NOTE — DISCHARGE INSTRUCTIONS
Your blood pressure was uncontrolled. Follow-up with your cardiologist and primary care physician for better control. Talk to your cardiologist about possible stress test.  You may proceed with your second Covid vaccine. Return for worsening concerning symptoms. Xr Chest Pa Lat    Result Date: 4/13/2021  Two view chest History: Chest pain pressure, sob, post covid since January Comparison: 12/27/2012 Findings: The heart and mediastinal silhouette are normal in size and configuration. The lungs and pleural spaces are clear aside from a small left lower lung zone granuloma and adjacent scarring. The pulmonary vascularity is within normal limits. There is a mild levocurvature of the upper thoracic spine. No active disease in the chest.      Results for orders placed or performed during the hospital encounter of 04/13/21   TROPONIN-HIGH SENSITIVITY   Result Value Ref Range    Troponin-High Sensitivity 9.1 0 - 14 pg/mL   TROPONIN-HIGH SENSITIVITY   Result Value Ref Range    Troponin-High Sensitivity 11.0 0 - 14 pg/mL   CBC WITH AUTOMATED DIFF   Result Value Ref Range    WBC 5.1 4.3 - 11.1 K/uL    RBC 4.01 (L) 4.05 - 5.2 M/uL    HGB 12.2 11.7 - 15.4 g/dL    HCT 37.0 35.8 - 46.3 %    MCV 92.3 79.6 - 97.8 FL    MCH 30.4 26.1 - 32.9 PG    MCHC 33.0 31.4 - 35.0 g/dL    RDW 13.2 11.9 - 14.6 %    PLATELET 526 308 - 475 K/uL    MPV 9.4 9.4 - 12.3 FL    ABSOLUTE NRBC 0.00 0.0 - 0.2 K/uL    DF AUTOMATED      NEUTROPHILS 42 (L) 43 - 78 %    LYMPHOCYTES 44 13 - 44 %    MONOCYTES 11 4.0 - 12.0 %    EOSINOPHILS 2 0.5 - 7.8 %    BASOPHILS 0 0.0 - 2.0 %    IMMATURE GRANULOCYTES 0 0.0 - 5.0 %    ABS. NEUTROPHILS 2.1 1.7 - 8.2 K/UL    ABS. LYMPHOCYTES 2.2 0.5 - 4.6 K/UL    ABS. MONOCYTES 0.6 0.1 - 1.3 K/UL    ABS. EOSINOPHILS 0.1 0.0 - 0.8 K/UL    ABS. BASOPHILS 0.0 0.0 - 0.2 K/UL    ABS. IMM.  GRANS. 0.0 0.0 - 0.5 K/UL   METABOLIC PANEL, COMPREHENSIVE   Result Value Ref Range    Sodium 140 136 - 145 mmol/L    Potassium 3.5 3.5 - 5.1 mmol/L    Chloride 107 98 - 107 mmol/L    CO2 26 21 - 32 mmol/L    Anion gap 7 7 - 16 mmol/L    Glucose 93 65 - 100 mg/dL    BUN 18 8 - 23 MG/DL    Creatinine 0.78 0.6 - 1.0 MG/DL    GFR est AA >60 >60 ml/min/1.73m2    GFR est non-AA >60 >60 ml/min/1.73m2    Calcium 9.6 8.3 - 10.4 MG/DL    Bilirubin, total 0.3 0.2 - 1.1 MG/DL    ALT (SGPT) 22 12 - 65 U/L    AST (SGOT) 11 (L) 15 - 37 U/L    Alk.  phosphatase 80 50 - 136 U/L    Protein, total 8.6 (H) 6.3 - 8.2 g/dL    Albumin 4.0 3.2 - 4.6 g/dL    Globulin 4.6 (H) 2.3 - 3.5 g/dL    A-G Ratio 0.9 (L) 1.2 - 3.5     LIPASE   Result Value Ref Range    Lipase 88 73 - 393 U/L   MAGNESIUM   Result Value Ref Range    Magnesium 2.2 1.8 - 2.4 mg/dL

## 2021-04-13 NOTE — ED NOTES
I have reviewed discharge instructions with the patient. The patient verbalized understanding. Patient left ED via Discharge Method: ambulatory to Home with . Opportunity for questions and clarification provided. Patient given 0 scripts. To continue your aftercare when you leave the hospital, you may receive an automated call from our care team to check in on how you are doing. This is a free service and part of our promise to provide the best care and service to meet your aftercare needs.  If you have questions, or wish to unsubscribe from this service please call [phone]. Thank you for Choosing our [facility] Emergency Department.

## 2021-04-13 NOTE — ED PROVIDER NOTES
81-year-old female with history of hypertension, anxiety, breast cancer, paroxysmal atrial fibrillation not on anticoagulation, sleep apnea presents with gradually worsening palpitations and central chest heaviness radiating to her back since having Covid in January. Symptoms had improved, but then worsened again after receiving her first [address] vaccine in March. Chest pain worsened even more over the past week. She went to get her second Covid vaccine today and they sent her to the emergency department for evaluation first.  She has shortness of breath with exertion. Denies cough, nausea, vomiting, diaphoresis. No documented fevers. She did not take her blood pressure medication today. No history of coronary artery disease. Scheduled for an echo next month. Chest Pain (Angina)   Associated symptoms include back pain and shortness of breath. Pertinent negatives include no abdominal pain, no cough, no fever, no headaches, no nausea and no vomiting. 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      Thickened endometrium     2019 -D&C    Vaginal atrophy 5/31/2016       Past Surgical History:   Procedure Laterality Date    HX BREAST BIOPSY Right 8/26/2016    RIGHT BREAST NEEDLE LOCALIZED BIOPSY performed by Patricia Birmingham MD     HX BREAST LUMPECTOMY Right 8/26/2016    RIGHT BREAST LUMPECTOMY performed by Sugar Arthur MD     HX BREAST LUMPECTOMY Right 9/23/2016    RIGHT BREAST LUMPECTOMY/PARTIAL REVISION  performed by Suagr Arthur    HX COLONOSCOPY  2007    diverticulosis    HX COLONOSCOPY  08/28/2019    5 yr recall.   TA    HX DILATION AND CURETTAGE  05/2019    HX ORTHOPAEDIC      left wrist ORIF    HX TUBAL LIGATION      HX VASCULAR ACCESS      port has been removed     NELA BIOPSY BREAST STEREOTACTIC Right 7.19.16         Family History:   Problem Relation Age of Onset    Diabetes Mother     Alzheimer Mother     No Known Problems Father     Breast Cancer Maternal Aunt        Social History     Socioeconomic History    Marital status:      Spouse name: Not on file    Number of children: Not on file    Years of education: Not on file    Highest education level: Not on file   Occupational History    Not on file   Social Needs    Financial resource strain: Not on file    Food insecurity     Worry: Not on file     Inability: Not on file    Transportation needs     Medical: Not on file     Non-medical: Not on file   Tobacco Use    Smoking status: Never Smoker    Smokeless tobacco: Never Used   Substance and Sexual Activity    Alcohol use: Yes     Comment: very rare     Drug use: No    Sexual activity: Yes     Partners: Male     Birth control/protection: Surgical   Lifestyle    Physical activity     Days per week: Not on file     Minutes per session: Not on file    Stress: Not on file   Relationships    Social connections     Talks on phone: Not on file     Gets together: Not on file     Attends Orthodox service: Not on file     Active member of club or organization: Not on file     Attends meetings of clubs or organizations: Not on file     Relationship status: Not on file    Intimate partner violence     Fear of current or ex partner: Not on file     Emotionally abused: Not on file     Physically abused: Not on file Forced sexual activity: Not on file   Other Topics Concern    Not on file   Social History Narrative    Lives at home with , [place], and her mother. ALLERGIES: Pcn [penicillins]    Review of Systems   Constitutional: Negative for fever. HENT: Negative for hearing loss. Eyes: Negative for visual disturbance. Respiratory: Positive for shortness of breath. Negative for cough. Cardiovascular: Positive for chest pain. Gastrointestinal: Negative for abdominal pain, diarrhea, nausea and vomiting. Musculoskeletal: Positive for back pain. Skin: Negative for rash. Neurological: Negative for headaches. Psychiatric/Behavioral: Negative for confusion. All other systems reviewed and are negative. Vitals:    04/13/21 1312   BP: (!) 221/116   Pulse: 93   Resp: 18   Temp: 98.4 °F (36.9 °C)   SpO2: 97%   Weight: 86.2 kg (190 lb)   Height: 5' 4\" (1.626 m)            Physical Exam  Vitals signs and nursing note reviewed. Constitutional:       Appearance: Normal appearance. She is well-developed. HENT:      Head: Normocephalic and atraumatic. Nose: Nose normal.      Mouth/Throat:      Mouth: Mucous membranes are moist.   Eyes:      Pupils: Pupils are equal, round, and reactive to light. Neck:      Musculoskeletal: Normal range of motion and neck supple. Cardiovascular:      Rate and Rhythm: Regular rhythm. Heart sounds: Normal heart sounds. Pulmonary:      Effort: Pulmonary effort is normal.      Breath sounds: Normal breath sounds. Abdominal:      Palpations: Abdomen is soft. Tenderness: There is no abdominal tenderness. Musculoskeletal: Normal range of motion. General: No deformity. Skin:     General: Skin is warm and dry. Neurological:      General: No focal deficit present. Mental Status: She is alert. Mental status is at baseline.    Psychiatric:         Mood and Affect: Mood normal.         Behavior: Behavior normal.          MDM  Number of Diagnoses or Management Options  Diagnosis management comments: Parts of this document were created using dragon voice recognition software. The chart has been reviewed but errors may still be present. I wore appropriate PPE throughout this patient's ED visit. Danii Miles MD, 1:40 PM    3:46 PM  Work-up unremarkable. Given referral to cardiology. Needs better BP control     I discussed the results of all labs, procedures, radiographs, and treatments with the patient and available family. Treatment plan is agreed upon and the patient is ready for discharge. Questions about treatment in the ED and differential diagnosis of presenting condition were answered. Patient was given verbal discharge instructions including, but not limited to, importance of returning to the emergency department for any concern of worsening or continued symptoms. Instructions were given to follow up with a primary care provider or specialist within 1-2 days. Adverse effects of medications, if prescribed, were discussed and patient was advised to refrain from significant physical activity until followed up by primary care physician and to not drive or operate heavy machinery after taking any sedating substances.            Amount and/or Complexity of Data Reviewed  Clinical lab tests: reviewed and ordered (Results for orders placed or performed during the hospital encounter of 04/13/21  -TROPONIN-HIGH SENSITIVITY       Result                      Value             Ref Range           Troponin-High Sensitiv*     9.1               0 - 14 pg/mL   -TROPONIN-HIGH SENSITIVITY       Result                      Value             Ref Range           Troponin-High Sensitiv*     11.0              0 - 14 pg/mL   -CBC WITH AUTOMATED DIFF       Result                      Value             Ref Range           WBC                         5.1               4.3 - 11.1 K*       RBC                         4.01 (L)          4.05 - 5.2 M*       HGB 12.2              11.7 - 15.4 *       HCT                         37.0              35.8 - 46.3 %       MCV                         92.3              79.6 - 97.8 *       MCH                         30.4              26.1 - 32.9 *       MCHC                        33.0              31.4 - 35.0 *       RDW                         13.2              11.9 - 14.6 %       PLATELET                    304               150 - 450 K/*       MPV                         9.4               9.4 - 12.3 FL       ABSOLUTE NRBC               0.00              0.0 - 0.2 K/*       DF                          AUTOMATED                             NEUTROPHILS                 42 (L)            43 - 78 %           LYMPHOCYTES                 44                13 - 44 %           MONOCYTES                   11                4.0 - 12.0 %        EOSINOPHILS                 2                 0.5 - 7.8 %         BASOPHILS                   0                 0.0 - 2.0 %         IMMATURE GRANULOCYTES       0                 0.0 - 5.0 %         ABS. NEUTROPHILS            2.1               1.7 - 8.2 K/*       ABS. LYMPHOCYTES            2.2               0.5 - 4.6 K/*       ABS. MONOCYTES              0.6               0.1 - 1.3 K/*       ABS. EOSINOPHILS            0.1               0.0 - 0.8 K/*       ABS. BASOPHILS              0.0               0.0 - 0.2 K/*       ABS. IMM.  GRANS.            0.0               0.0 - 0.5 K/*  -METABOLIC PANEL, COMPREHENSIVE       Result                      Value             Ref Range           Sodium                      140               136 - 145 mm*       Potassium                   3.5               3.5 - 5.1 mm*       Chloride                    107               98 - 107 mmo*       CO2                         26                21 - 32 mmol*       Anion gap                   7                 7 - 16 mmol/L       Glucose                     93                65 - 100 mg/*       BUN 18                8 - 23 MG/DL        Creatinine                  0.78              0.6 - 1.0 MG*       GFR est AA                  >60               >60 ml/min/1*       GFR est non-AA              >60               >60 ml/min/1*       Calcium                     9.6               8.3 - 10.4 M*       Bilirubin, total            0.3               0.2 - 1.1 MG*       ALT (SGPT)                  22                12 - 65 U/L         AST (SGOT)                  11 (L)            15 - 37 U/L         Alk. phosphatase            80                50 - 136 U/L        Protein, total              8.6 (H)           6.3 - 8.2 g/*       Albumin                     4.0               3.2 - 4.6 g/*       Globulin                    4.6 (H)           2.3 - 3.5 g/*       A-G Ratio                   0.9 (L)           1.2 - 3.5      -LIPASE       Result                      Value             Ref Range           Lipase                      88                73 - 393 U/L   -MAGNESIUM       Result                      Value             Ref Range           Magnesium                   2.2               1.8 - 2.4 mg*  )  Tests in the radiology section of CPT®: ordered and reviewed (Xr Chest Pa Lat    Result Date: 4/13/2021  Two view chest History: Chest pain pressure, sob, post covid since January Comparison: 12/27/2012 Findings: The heart and mediastinal silhouette are normal in size and configuration. The lungs and pleural spaces are clear aside from a small left lower lung zone granuloma and adjacent scarring. The pulmonary vascularity is within normal limits. There is a mild levocurvature of the upper thoracic spine.      No active disease in the chest.     )  Tests in the medicine section of CPT®: ordered and reviewed           EKG    Date/Time: 4/13/2021 1:40 PM  Performed by: Elda Stevens MD  Authorized by: Elda Stevens MD     ECG reviewed by ED Physician in the absence of a cardiologist: yes Interpretation:     Interpretation: abnormal    Rate:     ECG rate:  93    ECG rate assessment: normal    Rhythm:     Rhythm: sinus rhythm    Ectopy:     Ectopy: none    ST segments:     ST segments:  Non-specific

## 2021-04-14 LAB
ATRIAL RATE: 93 BPM
CALCULATED P AXIS, ECG09: 37 DEGREES
CALCULATED R AXIS, ECG10: 19 DEGREES
CALCULATED T AXIS, ECG11: 45 DEGREES
DIAGNOSIS, 93000: NORMAL
P-R INTERVAL, ECG05: 138 MS
Q-T INTERVAL, ECG07: 376 MS
QRS DURATION, ECG06: 78 MS
QTC CALCULATION (BEZET), ECG08: 467 MS
VENTRICULAR RATE, ECG03: 93 BPM

## 2021-04-22 ENCOUNTER — HOSPITAL ENCOUNTER (OUTPATIENT)
Dept: LAB | Age: 66
Discharge: HOME | End: 2021-04-22
Payer: OTHER GOVERNMENT

## 2021-04-22 DIAGNOSIS — R00.2 PALPITATIONS: ICD-10-CM

## 2021-04-22 LAB — TSH SERPL DL<=0.005 MIU/L-ACNC: 0.79 UIU/ML (ref 0.36–3.74)

## 2021-04-22 PROCEDURE — 84443 ASSAY THYROID STIM HORMONE: CPT

## 2021-04-22 PROCEDURE — 36415 COLL VENOUS BLD VENIPUNCTURE: CPT

## 2021-05-10 PROBLEM — R06.02 SHORTNESS OF BREATH: Status: ACTIVE | Noted: 2021-05-10

## 2021-05-11 ENCOUNTER — HOSPITAL ENCOUNTER (OUTPATIENT)
Dept: MAMMOGRAPHY | Age: 66
Discharge: HOME | End: 2021-05-11
Attending: NURSE PRACTITIONER
Payer: OTHER GOVERNMENT

## 2021-05-11 DIAGNOSIS — Z79.811 USE OF AROMATASE INHIBITORS: ICD-10-CM

## 2021-05-11 PROCEDURE — 77080 DXA BONE DENSITY AXIAL: CPT

## 2021-06-29 ENCOUNTER — HOSPITAL ENCOUNTER (OUTPATIENT)
Dept: MAMMOGRAPHY | Age: 66
Discharge: HOME | End: 2021-06-29
Attending: INTERNAL MEDICINE
Payer: OTHER GOVERNMENT

## 2021-06-29 DIAGNOSIS — Z12.31 SCREENING MAMMOGRAM FOR HIGH-RISK PATIENT: ICD-10-CM

## 2021-06-29 PROCEDURE — 77067 SCR MAMMO BI INCL CAD: CPT

## 2021-08-23 ENCOUNTER — HOSPITAL ENCOUNTER (OUTPATIENT)
Dept: LAB | Age: 66
Discharge: HOME | End: 2021-08-23
Payer: OTHER GOVERNMENT

## 2021-08-23 DIAGNOSIS — Z17.0 MALIGNANT NEOPLASM OF RIGHT BREAST IN FEMALE, ESTROGEN RECEPTOR POSITIVE, UNSPECIFIED SITE OF BREAST (HCC): ICD-10-CM

## 2021-08-23 DIAGNOSIS — C50.911 MALIGNANT NEOPLASM OF RIGHT BREAST IN FEMALE, ESTROGEN RECEPTOR POSITIVE, UNSPECIFIED SITE OF BREAST (HCC): ICD-10-CM

## 2021-08-23 LAB
ALBUMIN SERPL-MCNC: 4 G/DL (ref 3.2–4.6)
ALBUMIN/GLOB SERPL: 0.9 {RATIO} (ref 1.2–3.5)
ALP SERPL-CCNC: 68 U/L (ref 50–136)
ALT SERPL-CCNC: 19 U/L (ref 12–65)
ANION GAP SERPL CALC-SCNC: 5 MMOL/L (ref 7–16)
AST SERPL-CCNC: 12 U/L (ref 15–37)
BASOPHILS # BLD: 0 K/UL (ref 0–0.2)
BASOPHILS NFR BLD: 0 % (ref 0–2)
BILIRUB SERPL-MCNC: 0.5 MG/DL (ref 0.2–1.1)
BUN SERPL-MCNC: 22 MG/DL (ref 8–23)
CALCIUM SERPL-MCNC: 9.8 MG/DL (ref 8.3–10.4)
CHLORIDE SERPL-SCNC: 106 MMOL/L (ref 98–107)
CO2 SERPL-SCNC: 28 MMOL/L (ref 21–32)
CREAT SERPL-MCNC: 0.9 MG/DL (ref 0.6–1)
DIFFERENTIAL METHOD BLD: ABNORMAL
EOSINOPHIL # BLD: 0.2 K/UL (ref 0–0.8)
EOSINOPHIL NFR BLD: 4 % (ref 0.5–7.8)
ERYTHROCYTE [DISTWIDTH] IN BLOOD BY AUTOMATED COUNT: 13.3 % (ref 11.9–14.6)
GLOBULIN SER CALC-MCNC: 4.7 G/DL (ref 2.3–3.5)
GLUCOSE SERPL-MCNC: 129 MG/DL (ref 65–100)
HCT VFR BLD AUTO: 34.1 % (ref 35.8–46.3)
HGB BLD-MCNC: 11 G/DL (ref 11.7–15.4)
IMM GRANULOCYTES # BLD AUTO: 0 K/UL (ref 0–0.5)
IMM GRANULOCYTES NFR BLD AUTO: 0 % (ref 0–5)
LYMPHOCYTES # BLD: 1.5 K/UL (ref 0.5–4.6)
LYMPHOCYTES NFR BLD: 37 % (ref 13–44)
MCH RBC QN AUTO: 29.7 PG (ref 26.1–32.9)
MCHC RBC AUTO-ENTMCNC: 32.3 G/DL (ref 31.4–35)
MCV RBC AUTO: 92.2 FL (ref 79.6–97.8)
MONOCYTES # BLD: 0.4 K/UL (ref 0.1–1.3)
MONOCYTES NFR BLD: 11 % (ref 4–12)
NEUTS SEG # BLD: 1.8 K/UL (ref 1.7–8.2)
NEUTS SEG NFR BLD: 48 % (ref 43–78)
NRBC # BLD: 0 K/UL (ref 0–0.2)
PLATELET # BLD AUTO: 265 K/UL (ref 150–450)
PMV BLD AUTO: 9.1 FL (ref 9.4–12.3)
POTASSIUM SERPL-SCNC: 4.1 MMOL/L (ref 3.5–5.1)
PROT SERPL-MCNC: 8.7 G/DL (ref 6.3–8.2)
RBC # BLD AUTO: 3.7 M/UL (ref 4.05–5.2)
SODIUM SERPL-SCNC: 139 MMOL/L (ref 136–145)
WBC # BLD AUTO: 3.9 K/UL (ref 4.3–11.1)

## 2021-08-23 PROCEDURE — 85025 COMPLETE CBC W/AUTO DIFF WBC: CPT

## 2021-08-23 PROCEDURE — 80053 COMPREHEN METABOLIC PANEL: CPT

## 2021-08-23 PROCEDURE — 36415 COLL VENOUS BLD VENIPUNCTURE: CPT

## 2022-02-23 ENCOUNTER — HOSPITAL ENCOUNTER (OUTPATIENT)
Dept: LAB | Age: 67
Discharge: HOME | End: 2022-02-23
Payer: OTHER GOVERNMENT

## 2022-02-23 DIAGNOSIS — C50.911 INFILTRATING DUCTAL CARCINOMA OF RIGHT BREAST (HCC): ICD-10-CM

## 2022-02-23 LAB
ALBUMIN SERPL-MCNC: 4.1 G/DL (ref 3.2–4.6)
ALBUMIN/GLOB SERPL: 1 {RATIO} (ref 1.2–3.5)
ALP SERPL-CCNC: 82 U/L (ref 50–136)
ALT SERPL-CCNC: 19 U/L (ref 12–65)
ANION GAP SERPL CALC-SCNC: 5 MMOL/L (ref 7–16)
AST SERPL-CCNC: 17 U/L (ref 15–37)
BASOPHILS # BLD: 0 K/UL (ref 0–0.2)
BASOPHILS NFR BLD: 0 % (ref 0–2)
BILIRUB SERPL-MCNC: 0.5 MG/DL (ref 0.2–1.1)
BUN SERPL-MCNC: 19 MG/DL (ref 8–23)
CALCIUM SERPL-MCNC: 9.6 MG/DL (ref 8.3–10.4)
CHLORIDE SERPL-SCNC: 107 MMOL/L (ref 98–107)
CO2 SERPL-SCNC: 27 MMOL/L (ref 21–32)
CREAT SERPL-MCNC: 0.8 MG/DL (ref 0.6–1)
DIFFERENTIAL METHOD BLD: NORMAL
EOSINOPHIL # BLD: 0.1 K/UL (ref 0–0.8)
EOSINOPHIL NFR BLD: 2 % (ref 0.5–7.8)
ERYTHROCYTE [DISTWIDTH] IN BLOOD BY AUTOMATED COUNT: 13.8 % (ref 11.9–14.6)
GLOBULIN SER CALC-MCNC: 4.2 G/DL (ref 2.3–3.5)
GLUCOSE SERPL-MCNC: 102 MG/DL (ref 65–100)
HCT VFR BLD AUTO: 38.2 % (ref 35.8–46.3)
HGB BLD-MCNC: 12.2 G/DL (ref 11.7–15.4)
IMM GRANULOCYTES # BLD AUTO: 0 K/UL (ref 0–0.5)
IMM GRANULOCYTES NFR BLD AUTO: 0 % (ref 0–5)
LYMPHOCYTES # BLD: 2.1 K/UL (ref 0.5–4.6)
LYMPHOCYTES NFR BLD: 42 % (ref 13–44)
MCH RBC QN AUTO: 29.1 PG (ref 26.1–32.9)
MCHC RBC AUTO-ENTMCNC: 31.9 G/DL (ref 31.4–35)
MCV RBC AUTO: 91.2 FL (ref 79.6–97.8)
MONOCYTES # BLD: 0.5 K/UL (ref 0.1–1.3)
MONOCYTES NFR BLD: 10 % (ref 4–12)
NEUTS SEG # BLD: 2.2 K/UL (ref 1.7–8.2)
NEUTS SEG NFR BLD: 45 % (ref 43–78)
NRBC # BLD: 0 K/UL (ref 0–0.2)
PLATELET # BLD AUTO: 280 K/UL (ref 150–450)
PMV BLD AUTO: 9.4 FL (ref 9.4–12.3)
POTASSIUM SERPL-SCNC: 3.7 MMOL/L (ref 3.5–5.1)
PROT SERPL-MCNC: 8.3 G/DL (ref 6.3–8.2)
RBC # BLD AUTO: 4.19 M/UL (ref 4.05–5.2)
SODIUM SERPL-SCNC: 139 MMOL/L (ref 136–145)
WBC # BLD AUTO: 4.9 K/UL (ref 4.3–11.1)

## 2022-02-23 PROCEDURE — 36415 COLL VENOUS BLD VENIPUNCTURE: CPT

## 2022-02-23 PROCEDURE — 80053 COMPREHEN METABOLIC PANEL: CPT

## 2022-02-23 PROCEDURE — 85025 COMPLETE CBC W/AUTO DIFF WBC: CPT

## 2022-03-18 PROBLEM — R06.02 SHORTNESS OF BREATH: Status: ACTIVE | Noted: 2021-05-10

## 2022-03-19 PROBLEM — Z79.899 HIGH RISK MEDICATION USE: Status: ACTIVE | Noted: 2017-05-10

## 2022-03-20 PROBLEM — I47.1 SVT (SUPRAVENTRICULAR TACHYCARDIA) (HCC): Status: ACTIVE | Noted: 2017-12-14

## 2022-03-20 PROBLEM — I47.10 SVT (SUPRAVENTRICULAR TACHYCARDIA): Status: ACTIVE | Noted: 2017-12-14

## 2022-05-19 PROBLEM — R06.02 SHORTNESS OF BREATH: Status: RESOLVED | Noted: 2021-05-10 | Resolved: 2022-05-19

## 2022-05-19 PROBLEM — Z79.899 HIGH RISK MEDICATION USE: Status: RESOLVED | Noted: 2017-05-10 | Resolved: 2022-05-19

## 2022-05-19 PROBLEM — Z85.3 HISTORY OF BREAST CANCER: Status: ACTIVE | Noted: 2022-05-19

## 2022-06-01 ENCOUNTER — TELEPHONE (OUTPATIENT)
Dept: FAMILY MEDICINE CLINIC | Facility: CLINIC | Age: 67
End: 2022-06-01

## 2022-06-01 NOTE — TELEPHONE ENCOUNTER
When I last saw her, she felt that her anxiety was actually pretty well controlled. The Ativan in the past was only for when she went to doctor's appointment to help with these stress of that. I will gladly discuss her anxiety with her. But it probably would be better to discuss something daily or other management for her anxiety besides Ativan which is addictive and sedating.   Please schedule her at next available appointment so we can discuss other anxiety treatment options

## 2022-06-01 NOTE — TELEPHONE ENCOUNTER
Pt called and stated she is having a hard time taking care of her mother. Pt stated she had a script for Ativan and would like to know if Dr. Marlon Cleaning will send in a new script. Pt left message on nurse voicemail and sounded like she was crying.

## 2022-06-02 ENCOUNTER — OFFICE VISIT (OUTPATIENT)
Dept: FAMILY MEDICINE CLINIC | Facility: CLINIC | Age: 67
End: 2022-06-02
Payer: MEDICARE

## 2022-06-02 VITALS
OXYGEN SATURATION: 97 % | BODY MASS INDEX: 33.77 KG/M2 | HEIGHT: 63 IN | WEIGHT: 190.6 LBS | SYSTOLIC BLOOD PRESSURE: 140 MMHG | DIASTOLIC BLOOD PRESSURE: 80 MMHG | RESPIRATION RATE: 16 BRPM | HEART RATE: 81 BPM

## 2022-06-02 DIAGNOSIS — F43.21 GRIEF: ICD-10-CM

## 2022-06-02 DIAGNOSIS — F41.9 ANXIETY: Primary | ICD-10-CM

## 2022-06-02 PROCEDURE — 3017F COLORECTAL CA SCREEN DOC REV: CPT | Performed by: FAMILY MEDICINE

## 2022-06-02 PROCEDURE — 99213 OFFICE O/P EST LOW 20 MIN: CPT | Performed by: FAMILY MEDICINE

## 2022-06-02 PROCEDURE — G8427 DOCREV CUR MEDS BY ELIG CLIN: HCPCS | Performed by: FAMILY MEDICINE

## 2022-06-02 PROCEDURE — 1090F PRES/ABSN URINE INCON ASSESS: CPT | Performed by: FAMILY MEDICINE

## 2022-06-02 PROCEDURE — 1123F ACP DISCUSS/DSCN MKR DOCD: CPT | Performed by: FAMILY MEDICINE

## 2022-06-02 PROCEDURE — G8417 CALC BMI ABV UP PARAM F/U: HCPCS | Performed by: FAMILY MEDICINE

## 2022-06-02 PROCEDURE — G8399 PT W/DXA RESULTS DOCUMENT: HCPCS | Performed by: FAMILY MEDICINE

## 2022-06-02 PROCEDURE — 1036F TOBACCO NON-USER: CPT | Performed by: FAMILY MEDICINE

## 2022-06-02 RX ORDER — LORAZEPAM 0.5 MG/1
0.5 TABLET ORAL DAILY PRN
Qty: 7 TABLET | Refills: 0 | Status: SHIPPED | OUTPATIENT
Start: 2022-06-02 | End: 2022-07-02

## 2022-06-02 RX ORDER — CARVEDILOL 25 MG/1
TABLET ORAL
COMMUNITY
Start: 2022-05-19

## 2022-06-02 ASSESSMENT — PATIENT HEALTH QUESTIONNAIRE - PHQ9
SUM OF ALL RESPONSES TO PHQ QUESTIONS 1-9: 0
SUM OF ALL RESPONSES TO PHQ QUESTIONS 1-9: 0
2. FEELING DOWN, DEPRESSED OR HOPELESS: 0
SUM OF ALL RESPONSES TO PHQ QUESTIONS 1-9: 0
SUM OF ALL RESPONSES TO PHQ9 QUESTIONS 1 & 2: 0
1. LITTLE INTEREST OR PLEASURE IN DOING THINGS: 0
SUM OF ALL RESPONSES TO PHQ QUESTIONS 1-9: 0

## 2022-06-02 ASSESSMENT — ANXIETY QUESTIONNAIRES
6. BECOMING EASILY ANNOYED OR IRRITABLE: 0
IF YOU CHECKED OFF ANY PROBLEMS ON THIS QUESTIONNAIRE, HOW DIFFICULT HAVE THESE PROBLEMS MADE IT FOR YOU TO DO YOUR WORK, TAKE CARE OF THINGS AT HOME, OR GET ALONG WITH OTHER PEOPLE: NOT DIFFICULT AT ALL
1. FEELING NERVOUS, ANXIOUS, OR ON EDGE: 1
3. WORRYING TOO MUCH ABOUT DIFFERENT THINGS: 0
4. TROUBLE RELAXING: 1
2. NOT BEING ABLE TO STOP OR CONTROL WORRYING: 0
5. BEING SO RESTLESS THAT IT IS HARD TO SIT STILL: 0
GAD7 TOTAL SCORE: 2
7. FEELING AFRAID AS IF SOMETHING AWFUL MIGHT HAPPEN: 0

## 2022-06-02 NOTE — PATIENT INSTRUCTIONS
Patient Education        Grief (Actual/Anticipated): Care Instructions  Overview     Grief is an emotional and physical reaction to a major loss. The words \"sorrow\" and \"heartache\" often are used to describe feelings of grief. You may feel grief when you lose a beloved person, pet, place, or thing. It is also natural to feel grief when you lose a valued way of life, such as a job, marriage, orgood health. You may begin to grieve before a loss occurs. You may grieve for a loved one who is sick and dying. Children and adults often feel the pain of loss before abig move or divorce. Grief is different for each person. There is no \"normal\" or \"expected\" periodof time for grieving. Grieving can cause problems such as headaches, loss of appetite, and trouble with thinking or sleeping. You may withdraw from friends and family and behave in ways that are unusual for you. Grief may cause you to question your beliefsand views about life. Grief is natural and does not require medical treatment. It may help to talk with people who have been through or are going through similar losses. You may also want to talk to a counselor about your feelings. Talking about your loss, sharing your cares and concerns, and getting support from others are importantparts of healthy grieving. Follow-up care is a key part of your treatment and safety. Be sure to make and go to all appointments, and call your doctor if you are having problems. It's also a good idea to know your test results and keep alist of the medicines you take. How can you care for yourself at home?  Get enough sleep. Missing sleep may make it harder for you to cope with your grief.  Eat healthy foods. Ask someone to join you for a meal if you don't like eating alone.  Get some exercise every day. Even a walk can help you deal with your grief. Other exercises, such as yoga, may also help you manage stress.  Stay involved in your life.  Don't withdraw from the activities you enjoy. People you know at work, [employer], clubs, or other groups can help you.  Comfort yourself. Familiar surroundings and special items, such as photos or a loved one's favorite shirt, may give you comfort.  Think about joining a support group. When should you call for help? Call 911 anytime you think you may need emergency care. For example, call if:     You feel you cannot stop from hurting yourself or someone else. Watch closely for changes in your health, and be sure to contact your doctor if:     You think you may be depressed.      You do not get better as expected. Where can you learn more? Go to https://TexturapeRNDOMNeb.AppSocially. org and sign in to your [employer] account. Enter H249 in the Bandcamp box to learn more about \"Grief (Actual/Anticipated): Care Instructions. \"     If you do not have an account, please click on the \"Sign Up Now\" link. Current as of: October 18, 2021               Content Version: 13.2  © 2006-2022 [employer]wise[employer]. Care instructions adapted under license by [facility] ([facility]). If you have questions about a medical condition or this instruction, always ask your healthcare professional. [address] any warranty or liability for your use of this information.

## 2022-06-02 NOTE — PROGRESS NOTES
[facility], DO  [address], [address] [facility] [address]   No:  [phone]  Fax:  [phone]        Assessment/Plan:   Deisy Vyas was seen today for anxiety. Diagnoses and all orders for this visit:    Anxiety   aware assessed. Patient using lorazepam infrequently. Prescribing 7 tablets. Did discuss there is addiction potential with frequent use of lorazepam.  -     LORazepam (ATIVAN) 0.5 MG tablet; Take 1 tablet by mouth daily as needed for Anxiety for up to 30 days. Grief  Discussed patient's available support. Encouraged her to reach out to the hospice as they typically offer grief counseling both before and after a family member's passing. Discussed with patient that other medications are available if she starts developing any daily anxiety or depression  -     LORazepam (ATIVAN) 0.5 MG tablet; Take 1 tablet by mouth daily as needed for Anxiety for up to 30 days. Byron Wade is a 73 y.o. female who is seen for evaluation of   Chief Complaint   Patient presents with    Anxiety       HPI:  Patient here today because of acute anxiety increase. Her mother who is 80years of age with Alzheimer's was admitted to the hospital with ileus and during her hospitalization it was discovered she had metastatic cancer. She has large metastases to her liver. They do not know what the primary cancer is. She admitted her mother to hospice yesterday. She had a lot of difficulty with anxiety and breathing yesterday. She has an aunt in [place] whom she is close with. Is her mother's sister. They will be going to [place] for her mother's , and she does not want to have a panic attack in front of the grandchildren. She has used lorazepam in the past when going to physicians appointments but that had improved. But this new sad news has been hard for her to deal with. She is an only child. She does have friends she can talk with.   She states she can also reach out to the hospice. Review of Systems:  Review of Systems   Psychiatric/Behavioral: The patient is nervous/anxious. 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  Thickened endometrium     2019 -D&C    Vaginal atrophy 5/31/2016       Past Surgical History:   Procedure Laterality Date    BREAST BIOPSY Right 8/26/2016    RIGHT BREAST NEEDLE LOCALIZED BIOPSY performed by Jacques Rodriguez MD     BREAST LUMPECTOMY Right 8/26/2016    RIGHT BREAST LUMPECTOMY performed by Jacques Rodriguez MD     BREAST LUMPECTOMY Right 9/23/2016    RIGHT BREAST LUMPECTOMY/PARTIAL REVISION  performed by Jacques Rodriguez    COLONOSCOPY  08/28/2019    5 yr recall. TA    COLONOSCOPY  2007    diverticulosis    DILATION AND CURETTAGE OF UTERUS  05/2019    HARVEY BIOPSY BREAST STEREOTACTIC Right 7.19.16    ORTHOPEDIC SURGERY      left wrist ORIF    TUBAL LIGATION      VASCULAR SURGERY      port has been removed        Current Outpatient Medications   Medication Sig Dispense Refill    LORazepam (ATIVAN) 0.5 MG tablet Take 1 tablet by mouth daily as needed for Anxiety for up to 30 days.  7 tablet 0    acetaminophen (TYLENOL) 650 MG extended release tablet Take 1,300 mg by mouth every 8 hours as needed      diphenhydrAMINE (BENADRYL) 25 MG capsule Take 25 mg by mouth every 6 hours as needed      nystatin-triamcinolone (MYCOLOG II) 637351-9.1 UNIT/GM-% cream Apply topically 3 times daily      rosuvastatin (CRESTOR) 20 MG tablet Take 20 mg by mouth      triamterene-hydroCHLOROthiazide (MAXZIDE-25) 37.5-25 MG per tablet Take 1 tablet by mouth daily      carvedilol (COREG) 25 MG tablet        No current facility-administered medications for this visit. Immunization History   Administered Date(s) Administered    COVID-19, Moderna, Primary or Immunocompromised, PF, 100mcg/0.5mL 03/16/2021, 04/13/2021, 12/21/2021    Influenza Virus Vaccine 11/15/2016, 10/01/2018, 10/03/2019    Influenza, Quadv, IM, PF (6 mo and older Fluzone, Flulaval, Fluarix, and 3 yrs and older Afluria) 11/15/2016, 10/01/2018, 10/03/2019    Pneumococcal Polysaccharide ([id number]) 10/03/2016, 05/19/2022    Tdap (Boostrix, Adacel) 07/19/2017    Zoster Recombinant (Shingrix) 10/01/2018           Vitals:    Vitals:    06/02/22 0858   BP: (!) 140/80   Site: Left Upper Arm   Position: Sitting   Pulse: 81   Resp: 16   SpO2: 97%   Weight: 190 lb 9.6 oz (86.5 kg)   Height: 5' 3\" (1.6 m)          Physical Exam:  Physical Exam  Vitals reviewed. Constitutional:       Appearance: Normal appearance. HENT:      Head: Normocephalic and atraumatic. Pulmonary:      Effort: Pulmonary effort is normal. No respiratory distress. Neurological:      Mental Status: She is alert. Psychiatric:         Mood and Affect: Mood normal. Affect is tearful. Behavior: Behavior normal.             Senia Martini DO    This note was generated using Dragon voice recognition software.   There may be medical errors due to computer generated translation

## 2022-06-30 ENCOUNTER — HOSPITAL ENCOUNTER (OUTPATIENT)
Dept: MAMMOGRAPHY | Age: 67
Discharge: HOME | End: 2022-07-03
Payer: MEDICARE

## 2022-06-30 DIAGNOSIS — Z12.31 SCREENING MAMMOGRAM FOR HIGH-RISK PATIENT: ICD-10-CM

## 2022-06-30 PROCEDURE — 77067 SCR MAMMO BI INCL CAD: CPT

## 2022-07-08 ENCOUNTER — OFFICE VISIT (OUTPATIENT)
Dept: FAMILY MEDICINE CLINIC | Facility: CLINIC | Age: 67
End: 2022-07-08
Payer: MEDICARE

## 2022-07-08 VITALS
DIASTOLIC BLOOD PRESSURE: 80 MMHG | HEART RATE: 90 BPM | OXYGEN SATURATION: 98 % | BODY MASS INDEX: 34.02 KG/M2 | HEIGHT: 63 IN | RESPIRATION RATE: 16 BRPM | SYSTOLIC BLOOD PRESSURE: 160 MMHG | WEIGHT: 192 LBS

## 2022-07-08 DIAGNOSIS — E55.9 VITAMIN D DEFICIENCY: ICD-10-CM

## 2022-07-08 DIAGNOSIS — I47.1 SVT (SUPRAVENTRICULAR TACHYCARDIA) (HCC): ICD-10-CM

## 2022-07-08 DIAGNOSIS — R10.814 LEFT LOWER QUADRANT ABDOMINAL TENDERNESS WITHOUT REBOUND TENDERNESS: ICD-10-CM

## 2022-07-08 DIAGNOSIS — I10 ESSENTIAL HYPERTENSION: Primary | ICD-10-CM

## 2022-07-08 DIAGNOSIS — F43.21 GRIEF: ICD-10-CM

## 2022-07-08 DIAGNOSIS — H93.19 TINNITUS, UNSPECIFIED LATERALITY: ICD-10-CM

## 2022-07-08 DIAGNOSIS — I10 WHITE COAT SYNDROME WITH DIAGNOSIS OF HYPERTENSION: ICD-10-CM

## 2022-07-08 PROCEDURE — G8417 CALC BMI ABV UP PARAM F/U: HCPCS | Performed by: FAMILY MEDICINE

## 2022-07-08 PROCEDURE — G8399 PT W/DXA RESULTS DOCUMENT: HCPCS | Performed by: FAMILY MEDICINE

## 2022-07-08 PROCEDURE — 1036F TOBACCO NON-USER: CPT | Performed by: FAMILY MEDICINE

## 2022-07-08 PROCEDURE — 99214 OFFICE O/P EST MOD 30 MIN: CPT | Performed by: FAMILY MEDICINE

## 2022-07-08 PROCEDURE — 1090F PRES/ABSN URINE INCON ASSESS: CPT | Performed by: FAMILY MEDICINE

## 2022-07-08 PROCEDURE — G8427 DOCREV CUR MEDS BY ELIG CLIN: HCPCS | Performed by: FAMILY MEDICINE

## 2022-07-08 PROCEDURE — 1123F ACP DISCUSS/DSCN MKR DOCD: CPT | Performed by: FAMILY MEDICINE

## 2022-07-08 PROCEDURE — 3017F COLORECTAL CA SCREEN DOC REV: CPT | Performed by: FAMILY MEDICINE

## 2022-07-08 ASSESSMENT — ENCOUNTER SYMPTOMS
VOMITING: 0
NAUSEA: 0
BLOOD IN STOOL: 0
SHORTNESS OF BREATH: 0
ABDOMINAL PAIN: 0
COUGH: 0

## 2022-07-08 NOTE — PATIENT INSTRUCTIONS
Patient Education        Learning About Diverticulosis and Diverticulitis  What are diverticulosis and diverticulitis? In diverticulosis and diverticulitis, pouches called diverticula form in thewall of the large intestine, or colon.  In diverticulosis, the pouches do not cause any pain or other symptoms.  In diverticulitis, the pouches get inflamed or infected and cause symptoms. Doctors aren't sure what causes these pouches in the colon. But they think that a low-fiber diet may play a role. Without fiber to add bulk to the stool, the colon has to work harder than normal to push the stool forward. The pressurefrom this may cause pouches to form in weak spots along the colon. Some people with diverticulosis get diverticulitis. But experts don't know whythis happens. What are the symptoms?  In diverticulosis, most people don't have symptoms. But pouches sometimes bleed.  In diverticulitis, symptoms may last from a few hours to a week or more. They include:  ? Belly pain. This is usually in the lower left side. It is sometimes worse when you move. This is the most common symptom. ? Fever and chills. ? Bloating and gas. ? Diarrhea or constipation. ? Nausea and sometimes vomiting.  ? Not feeling like eating. How can you prevent diverticulitis? You may be able to lower your chance of getting diverticulitis. You can do thisby taking steps to prevent constipation.  Eat fruits, vegetables, beans, and whole grains every day. These foods are high in fiber.  Drink plenty of fluids. If you have kidney, heart, or liver disease and have to limit fluids, talk with your doctor before you increase the amount of fluids you drink.  Get at least 30 minutes of exercise on most days of the week. Walking is a good choice. You also may want to do other activities, such as running, swimming, cycling, or playing tennis or team sports.  Take a fiber supplement, such as Citrucel or Metamucil, every day if needed. Read and follow all instructions on the label.  Schedule time each day for a bowel movement. Having a daily routine may help. Take your time and do not strain when having a bowel movement. Some people avoid nuts, seeds, berries, and popcorn. They believe that these foods might get trapped in the diverticula and cause pain. But there is noproof that these foods cause diverticulitis or make it worse. How are these problems treated?  The best way to treat diverticulosis is to avoid constipation.  Treatment for diverticulitis includes antibiotics. It often includes a change in your diet. You may need only liquids at first. Your doctor may suggest pain medicines for pain or belly cramps. In some cases, surgery may be needed. Follow-up care is a key part of your treatment and safety. Be sure to make and go to all appointments, and call your doctor if you are having problems. It's also a good idea to know your test results and keep alist of the medicines you take. Where can you learn more? Go to https://"[employer]. org and sign in to your [employer] account. Enter S778 in the TouchLocal box to learn more about \"Learning About Diverticulosis and Diverticulitis. \"     If you do not have an account, please click on the \"Sign Up Now\" link. Current as of: September 8, 2021               Content Version: 13.3  © 7130-0589 [employer]. Care instructions adapted under license by [facility] ([facility]). If you have questions about a medical condition or this instruction, always ask your healthcare professional. [place] [address] any warranty or liability for your use of this information.

## 2022-07-08 NOTE — PROGRESS NOTES
[address] [facility],[address], DO  [address] [facility] [address]   No:  [phone]  Fax:  [phone]        Assessment/Plan:   Isma Santiago was seen today for blood pressure check and tinnitus. Diagnoses and all orders for this visit:    Essential hypertension  Blood pressure elevated in the office, did not decrease on recheck. She reports good blood pressure control at home. She does have history of whitecoat syndrome. She is going to check her blood pressure daily for the next 3 days and report back to the office on Monday. Advised patient if still elevated would add additional medication. Continue Maxide.  -     CBC with Auto Differential; Future  -     Comprehensive Metabolic Panel; Future  -     Lipid Panel; Future  -     Vitamin D 25 Hydroxy; Future    White coat syndrome with diagnosis of hypertension  Elevated BP today. However patient is sad from grief and she was in a rush today. Grief  She has good support in her family. She did not use lorazepam for the  she still has some left to use intermittently. In the past she is rarely use the medication only before doctors appointments at times    Left lower quadrant abdominal tenderness without rebound tenderness  Noted on exam today with mild changes in bowel movements. Advised patient to monitor for fever, chills or worsening pain that could be indicative of diverticulitis. She does have history of diverticulosis noted on last colonoscopy. Advised patient if it worsens over the weekend she may need to go to the urgent care, otherwise let me know next week if symptoms worsen    SVT (supraventricular tachycardia) ([facility] [address]  Continue beta-blocker. Vitamin D deficiency  Patient advised to start vitamin D after last appointment. -     CBC with Auto Differential; Future  -     Comprehensive Metabolic Panel; Future  -     Lipid Panel; Future  -     Vitamin D 25 Hydroxy; Future    Tinnitus.   Referral placed to ENT.    Labs:  No results found for this visit on 07/08/22. Juan Zhang is a 73 y.o. female who is seen for evaluation of   Chief Complaint   Patient presents with    Blood Pressure Check    Tinnitus       HPI:   Here today to follow-up high blood pressure after dose of carvedilol was increased in May. She had appointment recently due to grief from the passing of her mother from complications of metastatic cancer. Patient states that the test for FIT is had rectal cancer but they were not sure of the primary. She has had intermittent ringing in her ears. Particularly worse during times of stress or when it is quiet. She delivered a stomach discomfort today. She is had some frequent bowel movements that are a little softer but not liquid. No black or bloody stools. No fever chills no nausea or vomiting. No particular pain. She feels that something she ate last night. At home her blood pressure has been 136 over 80s. She has checked at least 3 times between appointments. She is tolerating the higher dose of carvedilol. She is a little stressed today as she was in a rush and was worried she would be late. She does have history of whitecoat syndrome. Review of Systems:  Review of Systems   Constitutional: Negative for chills, fever and unexpected weight change. Respiratory: Negative for cough and shortness of breath. Cardiovascular: Negative for chest pain and leg swelling. Gastrointestinal: Negative for abdominal pain, blood in stool, nausea and vomiting.          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  Thickened endometrium     2019 -D&C    Vaginal atrophy 5/31/2016       Past Surgical History:   Procedure Laterality Date    BREAST BIOPSY Right 8/26/2016    RIGHT BREAST NEEDLE LOCALIZED BIOPSY performed by Truman See MD     BREAST LUMPECTOMY Right 8/26/2016    RIGHT BREAST LUMPECTOMY performed by Truman See MD     BREAST LUMPECTOMY Right 9/23/2016    RIGHT BREAST LUMPECTOMY/PARTIAL REVISION  performed by Truman See    COLONOSCOPY  08/28/2019    5 yr recall. TA    COLONOSCOPY  2007    diverticulosis    DILATION AND CURETTAGE OF UTERUS  05/2019    HARVEY BIOPSY BREAST STEREOTACTIC Right 7.19.16    ORTHOPEDIC SURGERY      left wrist ORIF    TUBAL LIGATION      VASCULAR SURGERY      port has been removed        Current Outpatient Medications   Medication Sig Dispense Refill    carvedilol (COREG) 25 MG tablet       acetaminophen (TYLENOL) 650 MG extended release tablet Take 1,300 mg by mouth every 8 hours as needed      diphenhydrAMINE (BENADRYL) 25 MG capsule Take 25 mg by mouth every 6 hours as needed      nystatin-triamcinolone (MYCOLOG II) 205420-1.1 UNIT/GM-% cream Apply topically 3 times daily      rosuvastatin (CRESTOR) 20 MG tablet Take 20 mg by mouth      triamterene-hydroCHLOROthiazide (MAXZIDE-25) 37.5-25 MG per tablet Take 1 tablet by mouth daily      LORazepam (ATIVAN) 0.5 MG tablet Take 1 tablet by mouth daily as needed for Anxiety for up to 30 days. 7 tablet 0     No current facility-administered medications for this visit.        Immunization History   Administered Date(s) Administered    COVID-19, MODERNA BLUE border, Primary or Immunocompromised, (age 12y+), IM, 100 mcg/0.5mL 03/16/2021, 04/13/2021, 12/21/2021    Influenza Virus Vaccine 11/15/2016, 10/01/2018, 10/03/2019    Influenza, Quadv, IM, PF (6 mo and older Fluzone, Flulaval, Fluarix, and 3 yrs and older Afluria) 11/15/2016, 10/01/2018, 10/03/2019    Pneumococcal Polysaccharide ([id number]) 10/03/2016, 05/19/2022    Tdap (Boostrix, Adacel) 07/19/2017    Zoster Recombinant (Shingrix) 10/01/2018             Vitals:    Vitals:    07/08/22 1124   BP: (!) 160/80   Site: Left Upper Arm   Position: Sitting   Pulse: 90   Resp: 16   SpO2: 98%   Weight: 192 lb (87.1 kg)   Height: 5' 3\" (1.6 m)          Physical Exam:  Physical Exam  Vitals reviewed. Constitutional:       Appearance: Normal appearance. HENT:      Head: Normocephalic and atraumatic. Right Ear: Tympanic membrane, ear canal and external ear normal.      Left Ear: Tympanic membrane, ear canal and external ear normal.   Cardiovascular:      Rate and Rhythm: Normal rate and regular rhythm. Heart sounds: Murmur heard. Pulmonary:      Effort: Pulmonary effort is normal. No respiratory distress. Breath sounds: Normal breath sounds. No wheezing or rhonchi. Abdominal:      General: There is no distension. Palpations: There is no mass. Tenderness: There is abdominal tenderness in the left lower quadrant. There is no right CVA tenderness, left CVA tenderness, guarding or rebound. Hernia: No hernia is present. Musculoskeletal:      Cervical back: Neck supple. Right lower leg: No edema. Left lower leg: No edema. Lymphadenopathy:      Cervical: No cervical adenopathy. Skin:     General: Skin is warm and dry. Neurological:      Mental Status: She is alert. Psychiatric:         Mood and Affect: Mood normal. Affect is tearful. Behavior: Behavior normal.             Lalitha Barber DO    This note was generated using Dragon voice recognition software.   There may be medical errors due to computer generated translation

## 2022-11-04 ENCOUNTER — NURSE ONLY (OUTPATIENT)
Dept: FAMILY MEDICINE CLINIC | Facility: CLINIC | Age: 67
End: 2022-11-04

## 2022-11-04 DIAGNOSIS — E55.9 VITAMIN D DEFICIENCY: ICD-10-CM

## 2022-11-04 DIAGNOSIS — I10 ESSENTIAL HYPERTENSION: ICD-10-CM

## 2022-11-04 LAB
25(OH)D3 SERPL-MCNC: 19 NG/ML (ref 30–100)
ALBUMIN SERPL-MCNC: 4 G/DL (ref 3.2–4.6)
ALBUMIN/GLOB SERPL: 1.1 {RATIO} (ref 0.4–1.6)
ALP SERPL-CCNC: 76 U/L (ref 50–136)
ALT SERPL-CCNC: 17 U/L (ref 12–65)
ANION GAP SERPL CALC-SCNC: 5 MMOL/L (ref 2–11)
AST SERPL-CCNC: 8 U/L (ref 15–37)
BASOPHILS # BLD: 0 K/UL (ref 0–0.2)
BASOPHILS NFR BLD: 0 % (ref 0–2)
BILIRUB SERPL-MCNC: 0.4 MG/DL (ref 0.2–1.1)
BUN SERPL-MCNC: 24 MG/DL (ref 8–23)
CALCIUM SERPL-MCNC: 9.7 MG/DL (ref 8.3–10.4)
CHLORIDE SERPL-SCNC: 107 MMOL/L (ref 101–110)
CHOLEST SERPL-MCNC: 210 MG/DL
CO2 SERPL-SCNC: 29 MMOL/L (ref 21–32)
CREAT SERPL-MCNC: 1 MG/DL (ref 0.6–1)
DIFFERENTIAL METHOD BLD: ABNORMAL
EOSINOPHIL # BLD: 0.1 K/UL (ref 0–0.8)
EOSINOPHIL NFR BLD: 3 % (ref 0.5–7.8)
ERYTHROCYTE [DISTWIDTH] IN BLOOD BY AUTOMATED COUNT: 13.2 % (ref 11.9–14.6)
GLOBULIN SER CALC-MCNC: 3.6 G/DL (ref 2.8–4.5)
GLUCOSE SERPL-MCNC: 115 MG/DL (ref 65–100)
HCT VFR BLD AUTO: 36.6 % (ref 35.8–46.3)
HDLC SERPL-MCNC: 71 MG/DL (ref 40–60)
HDLC SERPL: 3 {RATIO}
HGB BLD-MCNC: 11.8 G/DL (ref 11.7–15.4)
IMM GRANULOCYTES # BLD AUTO: 0 K/UL (ref 0–0.5)
IMM GRANULOCYTES NFR BLD AUTO: 0 % (ref 0–5)
LDLC SERPL CALC-MCNC: 126.2 MG/DL
LYMPHOCYTES # BLD: 1.4 K/UL (ref 0.5–4.6)
LYMPHOCYTES NFR BLD: 31 % (ref 13–44)
MCH RBC QN AUTO: 29.9 PG (ref 26.1–32.9)
MCHC RBC AUTO-ENTMCNC: 32.2 G/DL (ref 31.4–35)
MCV RBC AUTO: 92.7 FL (ref 82–102)
MONOCYTES # BLD: 0.5 K/UL (ref 0.1–1.3)
MONOCYTES NFR BLD: 11 % (ref 4–12)
NEUTS SEG # BLD: 2.5 K/UL (ref 1.7–8.2)
NEUTS SEG NFR BLD: 55 % (ref 43–78)
NRBC # BLD: 0 K/UL (ref 0–0.2)
PLATELET # BLD AUTO: 280 K/UL (ref 150–450)
PMV BLD AUTO: 9.9 FL (ref 9.4–12.3)
POTASSIUM SERPL-SCNC: 4.1 MMOL/L (ref 3.5–5.1)
PROT SERPL-MCNC: 7.6 G/DL (ref 6.3–8.2)
RBC # BLD AUTO: 3.95 M/UL (ref 4.05–5.2)
SODIUM SERPL-SCNC: 141 MMOL/L (ref 133–143)
TRIGL SERPL-MCNC: 64 MG/DL (ref 35–150)
VLDLC SERPL CALC-MCNC: 12.8 MG/DL (ref 6–23)
WBC # BLD AUTO: 4.5 K/UL (ref 4.3–11.1)

## 2022-11-08 ENCOUNTER — OFFICE VISIT (OUTPATIENT)
Dept: FAMILY MEDICINE CLINIC | Facility: CLINIC | Age: 67
End: 2022-11-08
Payer: MEDICARE

## 2022-11-08 VITALS
DIASTOLIC BLOOD PRESSURE: 68 MMHG | OXYGEN SATURATION: 97 % | HEIGHT: 63 IN | BODY MASS INDEX: 33.88 KG/M2 | HEART RATE: 79 BPM | SYSTOLIC BLOOD PRESSURE: 116 MMHG | RESPIRATION RATE: 16 BRPM | WEIGHT: 191.2 LBS

## 2022-11-08 DIAGNOSIS — R07.89 CHEST HEAVINESS: Primary | ICD-10-CM

## 2022-11-08 DIAGNOSIS — R35.0 URINE FREQUENCY: ICD-10-CM

## 2022-11-08 DIAGNOSIS — E55.9 VITAMIN D DEFICIENCY: ICD-10-CM

## 2022-11-08 DIAGNOSIS — L60.3 BRITTLE NAILS: ICD-10-CM

## 2022-11-08 DIAGNOSIS — F41.9 ANXIETY: ICD-10-CM

## 2022-11-08 DIAGNOSIS — N64.4 BREAST PAIN: ICD-10-CM

## 2022-11-08 DIAGNOSIS — R53.83 OTHER FATIGUE: ICD-10-CM

## 2022-11-08 DIAGNOSIS — Z23 FLU VACCINE NEED: ICD-10-CM

## 2022-11-08 DIAGNOSIS — I10 ESSENTIAL HYPERTENSION: ICD-10-CM

## 2022-11-08 LAB
BILIRUBIN, URINE, POC: NEGATIVE
BLOOD URINE, POC: NEGATIVE
EXP DATE SOLUTION: NORMAL
EXP DATE SWAB: NORMAL
EXPIRATION DATE: NORMAL
GLUCOSE URINE, POC: NEGATIVE
KETONES, URINE, POC: NEGATIVE
LEUKOCYTE ESTERASE, URINE, POC: NEGATIVE
LOT NUMBER POC: NORMAL
LOT NUMBER SOLUTION: NORMAL
LOT NUMBER SWAB: NORMAL
NITRITE, URINE, POC: NEGATIVE
PH, URINE, POC: 7 (ref 4.6–8)
PROTEIN,URINE, POC: NEGATIVE
SARS-COV-2 RNA, POC: NEGATIVE
SPECIFIC GRAVITY, URINE, POC: 1.02 (ref 1–1.03)
URINALYSIS CLARITY, POC: CLEAR
URINALYSIS COLOR, POC: YELLOW
UROBILINOGEN, POC: NORMAL

## 2022-11-08 PROCEDURE — 1123F ACP DISCUSS/DSCN MKR DOCD: CPT | Performed by: FAMILY MEDICINE

## 2022-11-08 PROCEDURE — 1090F PRES/ABSN URINE INCON ASSESS: CPT | Performed by: FAMILY MEDICINE

## 2022-11-08 PROCEDURE — G0008 ADMIN INFLUENZA VIRUS VAC: HCPCS | Performed by: FAMILY MEDICINE

## 2022-11-08 PROCEDURE — G8399 PT W/DXA RESULTS DOCUMENT: HCPCS | Performed by: FAMILY MEDICINE

## 2022-11-08 PROCEDURE — 3074F SYST BP LT 130 MM HG: CPT | Performed by: FAMILY MEDICINE

## 2022-11-08 PROCEDURE — 93000 ELECTROCARDIOGRAM COMPLETE: CPT | Performed by: FAMILY MEDICINE

## 2022-11-08 PROCEDURE — 3078F DIAST BP <80 MM HG: CPT | Performed by: FAMILY MEDICINE

## 2022-11-08 PROCEDURE — G8427 DOCREV CUR MEDS BY ELIG CLIN: HCPCS | Performed by: FAMILY MEDICINE

## 2022-11-08 PROCEDURE — 90694 VACC AIIV4 NO PRSRV 0.5ML IM: CPT | Performed by: FAMILY MEDICINE

## 2022-11-08 PROCEDURE — 3017F COLORECTAL CA SCREEN DOC REV: CPT | Performed by: FAMILY MEDICINE

## 2022-11-08 PROCEDURE — G8484 FLU IMMUNIZE NO ADMIN: HCPCS | Performed by: FAMILY MEDICINE

## 2022-11-08 PROCEDURE — 87635 SARS-COV-2 COVID-19 AMP PRB: CPT | Performed by: FAMILY MEDICINE

## 2022-11-08 PROCEDURE — G8417 CALC BMI ABV UP PARAM F/U: HCPCS | Performed by: FAMILY MEDICINE

## 2022-11-08 PROCEDURE — 99214 OFFICE O/P EST MOD 30 MIN: CPT | Performed by: FAMILY MEDICINE

## 2022-11-08 PROCEDURE — 81003 URINALYSIS AUTO W/O SCOPE: CPT | Performed by: FAMILY MEDICINE

## 2022-11-08 PROCEDURE — 1036F TOBACCO NON-USER: CPT | Performed by: FAMILY MEDICINE

## 2022-11-08 RX ORDER — ESCITALOPRAM OXALATE 5 MG/1
5 TABLET ORAL DAILY
Qty: 30 TABLET | Refills: 1 | Status: SHIPPED | OUTPATIENT
Start: 2022-11-08

## 2022-11-08 ASSESSMENT — ENCOUNTER SYMPTOMS
COUGH: 0
VOMITING: 0
BLOOD IN STOOL: 0
CONSTIPATION: 1
ABDOMINAL PAIN: 0
NAUSEA: 0
SHORTNESS OF BREATH: 0

## 2022-11-08 NOTE — PROGRESS NOTES
[address] [facility], DO  [address], [address] [facility] [address]  Ph No:  [phone]  Fax:  [phone]        Assessment/Plan:   Norm Boykin was seen today for hypertension, fatigue, eye problem and anxiety. Diagnoses and all orders for this visit:    Chest heaviness  Lung exam clear. EKG without concerning findings. COVID 19 negative. I am concerned that it may be related to uncontrolled anxiety. Follow-up in 4 to 6 weeks to reevaluate. -     EKG 12 Lead; Future  -     AMB POC COVID-19 COV    Urine frequency  Urinalysis without evidence of infection. Advise decreasing caffeine intake. Provided her with information on bladder training. Discussed that there are medications available but she already has difficulty with dry eyes and constipation and does not want to worsen those symptoms.  -     AMB POC URINALYSIS DIP STICK AUTO W/O MICRO    Anxiety  Uncontrolled. Starting Lexapro today. Follow-up in 4 to 6 weeks for reevaluation. -     escitalopram (LEXAPRO) 5 MG tablet; Take 1 tablet by mouth daily    Breast pain  She is up-to-date on follow-up with oncology and mammogram.  Reevaluate in 4 to 6 weeks with her agreeable to eliminating or considerably cutting back on caffeine intake to see if this makes a difference in her breast discomfort    Brittle nails  Suspect this is due to low vitamin D. Advised increasing up to 2000 IU vitamin D3-3 tablets divided throughout the day    Vitamin D deficiency  Remains low. Continue vitamin D increasing dose up to 6000 IU. Essential hypertension  Blood pressure controlled. Continue triamterene-HCTZ, carvedilol. Reviewed non fasting labs with patient. She is going to consider getting a BP monitor    Other fatigue  Recent labs reviewed. Rapid COVID negative.   -     AMB POC COVID-19 COV  -     AMB POC URINALYSIS DIP STICK AUTO W/O MICRO    Flu vaccine need  -     Influenza, FLUAD, (age 72 y+), IM, Preservative Free, 0.5 mL        Labs:  Results for orders placed or performed in visit on 11/08/22   AMB POC COVID-19 COV   Result Value Ref Range    SARS-COV-2 RNA, POC Negative     Lot number swab      EXP date swab      Lot number solution      EXP date solution      LOT NUMBER POC      EXPIRATION DATE     AMB POC URINALYSIS DIP STICK AUTO W/O MICRO   Result Value Ref Range    Color (UA POC) Yellow     Clarity (UA POC) Clear     Glucose, Urine, POC Negative Negative    Bilirubin, Urine, POC Negative Negative    KETONES, Urine, POC Negative Negative    Specific Gravity, Urine, POC 1.020 1.001 - 1.035    Blood (UA POC) Negative Negative    pH, Urine, POC 7.0 4.6 - 8.0    Protein, Urine, POC Negative Negative    Urobilinogen, POC 0.2 mg/dL     Nitrite, Urine, POC Negative Negative    Leukocyte Esterase, Urine, POC Negative Negative       Nurse Only on 11/04/2022   Component Date Value Ref Range Status    Vit D, 25-Hydroxy 11/04/2022 19.0 (A)  30.0 - 100.0 ng/mL Final    Cholesterol, Total 11/04/2022 210 (A)  <200 MG/DL Final    Comment: Borderline High: 200-239 mg/dL  High: Greater than or equal to 240 mg/dL      Triglycerides 11/04/2022 64  35 - 150 MG/DL Final    Comment: Borderline High: 150-199 mg/dL, High: 200-499 mg/dL  Very High: Greater than or equal to 500 mg/dL      HDL 11/04/2022 71 (A)  40 - 60 MG/DL Final    LDL Calculated 11/04/2022 126.2 (A)  <100 MG/DL Final    Comment: Near Optimal: 100-129 mg/dL  Borderline High: 130-159, High: 160-189 mg/dL  Very High: Greater than or equal to 190 mg/dL      VLDL Cholesterol Calculated 11/04/2022 12.8  6.0 - 23.0 MG/DL Final    Chol/HDL Ratio 11/04/2022 3.0    Final    Sodium 11/04/2022 141  133 - 143 mmol/L Final    Potassium 11/04/2022 4.1  3.5 - 5.1 mmol/L Final    Chloride 11/04/2022 107  101 - 110 mmol/L Final    CO2 11/04/2022 29  21 - 32 mmol/L Final    Anion Gap 11/04/2022 5  2 - 11 mmol/L Final    Glucose 11/04/2022 115 (A)  65 - 100 mg/dL Final    BUN 11/04/2022 24 (A)  8 - 23 MG/DL Final    Creatinine 11/04/2022 1.00  0.6 - 1.0 MG/DL Final    Est, Glom Filt Rate 11/04/2022 >60  >60 ml/min/1.73m2 Final    Comment:   Pediatric calculator link: Shelly.at. org/professionals/kdoqi/gfr_calculatorped    Effective Oct 3, 2022    These results are not intended for use in patients <25years of age. eGFR results are calculated without a race factor using  the 2021 CKD-EPI equation. Careful clinical correlation is recommended, particularly when comparing to results calculated using previous equations. The CKD-EPI equation is less accurate in patients with extremes of muscle mass, extra-renal metabolism of creatinine, excessive creatine ingestion, or following therapy that affects renal tubular secretion.       Calcium 11/04/2022 9.7  8.3 - 10.4 MG/DL Final    Total Bilirubin 11/04/2022 0.4  0.2 - 1.1 MG/DL Final    ALT 11/04/2022 17  12 - 65 U/L Final    AST 11/04/2022 8 (A)  15 - 37 U/L Final    Alk Phosphatase 11/04/2022 76  50 - 136 U/L Final    Total Protein 11/04/2022 7.6  6.3 - 8.2 g/dL Final    Albumin 11/04/2022 4.0  3.2 - 4.6 g/dL Final    Globulin 11/04/2022 3.6  2.8 - 4.5 g/dL Final    Albumin/Globulin Ratio 11/04/2022 1.1  0.4 - 1.6   Final    WBC 11/04/2022 4.5  4.3 - 11.1 K/uL Final    RBC 11/04/2022 3.95 (A)  4.05 - 5.2 M/uL Final    Hemoglobin 11/04/2022 11.8  11.7 - 15.4 g/dL Final    Hematocrit 11/04/2022 36.6  35.8 - 46.3 % Final    MCV 11/04/2022 92.7  82 - 102 FL Final    MCH 11/04/2022 29.9  26.1 - 32.9 PG Final    MCHC 11/04/2022 32.2  31.4 - 35.0 g/dL Final    RDW 11/04/2022 13.2  11.9 - 14.6 % Final    Platelets 08/77/0858 280  150 - 450 K/uL Final    MPV 11/04/2022 9.9  9.4 - 12.3 FL Final    nRBC 11/04/2022 0.00  0.0 - 0.2 K/uL Final    **Note: Absolute NRBC parameter is now reported with Hemogram**    Differential Type 11/04/2022 AUTOMATED    Final    Seg Neutrophils 11/04/2022 55  43 - 78 % Final    Lymphocytes 11/04/2022 31  13 - 44 % Final    Monocytes 11/04/2022 11  4.0 - 12.0 % Final    Eosinophils % 11/04/2022 3  0.5 - 7.8 % Final    Basophils 11/04/2022 0  0.0 - 2.0 % Final    Immature Granulocytes 11/04/2022 0  0.0 - 5.0 % Final    Segs Absolute 11/04/2022 2.5  1.7 - 8.2 K/UL Final    Absolute Lymph # 11/04/2022 1.4  0.5 - 4.6 K/UL Final    Absolute Mono # 11/04/2022 0.5  0.1 - 1.3 K/UL Final    Absolute Eos # 11/04/2022 0.1  0.0 - 0.8 K/UL Final    Basophils Absolute 11/04/2022 0.0  0.0 - 0.2 K/UL Final    Absolute Immature Granulocyte 11/04/2022 0.0  0.0 - 0.5 K/UL Final           Danny Fitzpatrick is a 73 y.o. female who is seen for evaluation of   Chief Complaint   Patient presents with    Hypertension    Fatigue     Pt stated heaviness in chest and fatigue. Onset one week. Eye Problem     Bilateral eye burning and drainage. Onset 3 weeks. Anxiety       HPI:   For the past week because she is noted a feeling of chest heaviness and fatigue. She states around this time she was having family visiting which she admits did wear her out quite a bit. She has been taking carvedilol twice daily since her last appointment 4 months ago when she was worried that it may be related to the carvedilol, but she did not have it for the last several months. She is not having chest pain. She is not having cough, congestion, sore throat, body aches. She does not have a blood pressure cuff at home. She has misplaced it or she has given away she is not sure. Therefore she has not been checking her blood pressure. She been having urinary frequency for months. She states that she will avoid drinking coffee or soda before leaving to avoid having to urinate. She also thinks that her urinary frequency may be related to anxiety. She states she will have the urinate frequently when she is at [employer]. She has been drinking more soda in the last several months.   She is also had breast tenderness the last several month since her mammogram.  She is up-to-date on her follow-ups with oncology for history of breast cancer. She is also struggling with constipation. She is also struggling with anxiety. She is busy with her 3year-old grandson. She tries to be active when she is out with him. She does have good support in her family whom she can talk with. She has been taking vitamin D 2 tablets or capsules of vitamin D3 2000 IU daily. She also notes that her nails are quite brittle. Review of Systems:  Review of Systems   Constitutional:  Positive for fatigue. Negative for chills, fever and unexpected weight change. HENT:  Positive for tinnitus (chronic). Respiratory:  Negative for cough and shortness of breath. Cardiovascular:  Negative for chest pain and leg swelling. Gastrointestinal:  Positive for constipation. Negative for abdominal pain, blood in stool, nausea and vomiting. Psychiatric/Behavioral:  The patient is nervous/anxious. History:  Past Medical History:   Diagnosis Date    Anemia     no supplement currently     Anxiety     Arthritis     Breast cancer ([facility] [address] 2016    right     Ductal carcinoma in situ (DCIS) of right breast 7/2016    Enlarged uterus 5/31/2016    Hypertension     on med for control     Murmur, functional     diagnosed as teenager; \"functional murmur\" per pt.; last echo 09//26/18    Obesity 05/31/2016    BMI=33.1    Osteopenia     Paroxysmal atrial fibrillation ([facility] [address] 09/11/2001    followed by [facility]; on carvedilol     Psychiatric disorder     anxiety     Radiation therapy complication     Sleep apnea 09/11/2013    does not tolerate c-pap; instructed to bring dos.      Thickened endometrium     2019 -D&C    Vaginal atrophy 5/31/2016       Past Surgical History:   Procedure Laterality Date    BREAST BIOPSY Right 8/26/2016    RIGHT BREAST NEEDLE LOCALIZED BIOPSY performed by Kahlil Singh MD     BREAST LUMPECTOMY Right 8/26/2016    RIGHT BREAST LUMPECTOMY performed by Kahlil Singh MD     BREAST LUMPECTOMY Right 9/23/2016    RIGHT BREAST LUMPECTOMY/PARTIAL REVISION  performed by Saskia Acuña    COLONOSCOPY  08/28/2019    5 yr recall. TA    COLONOSCOPY  2007    diverticulosis    DILATION AND CURETTAGE OF UTERUS  05/2019    HARVEY BIOPSY BREAST STEREOTACTIC Right 7.19.16    ORTHOPEDIC SURGERY      left wrist ORIF    TUBAL LIGATION      VASCULAR SURGERY      port has been removed        Current Outpatient Medications   Medication Sig Dispense Refill    vitamin D (CHOLECALCIFEROL) 25 MCG (1000 UT) TABS tablet Take 1,000 Units by mouth in the morning and at bedtime      escitalopram (LEXAPRO) 5 MG tablet Take 1 tablet by mouth daily 30 tablet 1    carvedilol (COREG) 25 MG tablet Take 25 mg by mouth 2 times daily      acetaminophen (TYLENOL) 650 MG extended release tablet Take 1,300 mg by mouth every 8 hours as needed      diphenhydrAMINE (BENADRYL) 25 MG capsule Take 25 mg by mouth every 6 hours as needed      rosuvastatin (CRESTOR) 20 MG tablet Take 20 mg by mouth      triamterene-hydroCHLOROthiazide (MAXZIDE-25) 37.5-25 MG per tablet Take 1 tablet by mouth daily      LORazepam (ATIVAN) 0.5 MG tablet Take 1 tablet by mouth daily as needed for Anxiety for up to 30 days. 7 tablet 0     No current facility-administered medications for this visit.        Immunization History   Administered Date(s) Administered    COVID-19, MODERNA BLUE border, Primary or Immunocompromised, (age 12y+), IM, 100 mcg/0.5mL 03/16/2021, 04/13/2021, 12/21/2021    Influenza Virus Vaccine 11/15/2016, 10/01/2018, 10/03/2019    Influenza, FLUAD, (age 72 y+), Adjuvanted, 0.5mL 11/08/2022    Influenza, FLUARIX, FLULAVAL, FLUZONE (age 10 mo+) AND AFLURIA, (age 1 y+), PF, 0.5mL 11/15/2016, 10/01/2018, 10/03/2019    Pneumococcal Polysaccharide ([id number]) 10/03/2016, 05/19/2022    Tdap (Boostrix, Adacel) 07/19/2017    Zoster Recombinant (Shingrix) 10/01/2018             Vitals:    Vitals:    11/08/22 0946   BP: 116/68   Site: Left Upper Arm   Position: Sitting   Pulse: 79   Resp: 16   SpO2: 97%   Weight: 191 lb 3.2 oz (86.7 kg)   Height: 5' 3\" (1.6 m)          Physical Exam:  Physical Exam  Vitals reviewed. Constitutional:       Appearance: Normal appearance. HENT:      Head: Normocephalic and atraumatic. Cardiovascular:      Rate and Rhythm: Normal rate and regular rhythm. Heart sounds: No murmur heard. Pulmonary:      Effort: Pulmonary effort is normal. No respiratory distress. Breath sounds: Normal breath sounds. No wheezing or rhonchi. Abdominal:      General: There is no distension. Palpations: There is no mass. Tenderness: There is no abdominal tenderness. There is no right CVA tenderness, left CVA tenderness, guarding or rebound. Hernia: No hernia is present. Musculoskeletal:      Cervical back: Neck supple. Right lower leg: No edema. Left lower leg: No edema. Lymphadenopathy:      Cervical: No cervical adenopathy. Skin:     General: Skin is warm and dry. Neurological:      Mental Status: She is alert. Psychiatric:         Mood and Affect: Mood normal.         Behavior: Behavior normal.           Phan Nava DO    This note was generated using Dragon voice recognition software.   There may be medical errors due to computer generated translation

## 2022-11-30 ENCOUNTER — OFFICE VISIT (OUTPATIENT)
Dept: FAMILY MEDICINE CLINIC | Facility: CLINIC | Age: 67
End: 2022-11-30
Payer: MEDICARE

## 2022-11-30 ENCOUNTER — NURSE TRIAGE (OUTPATIENT)
Dept: OTHER | Facility: CLINIC | Age: 67
End: 2022-11-30

## 2022-11-30 VITALS
SYSTOLIC BLOOD PRESSURE: 132 MMHG | WEIGHT: 193 LBS | TEMPERATURE: 98.6 F | HEART RATE: 82 BPM | HEIGHT: 63 IN | DIASTOLIC BLOOD PRESSURE: 74 MMHG | OXYGEN SATURATION: 96 % | RESPIRATION RATE: 16 BRPM | BODY MASS INDEX: 34.2 KG/M2

## 2022-11-30 DIAGNOSIS — R05.1 ACUTE COUGH: Primary | ICD-10-CM

## 2022-11-30 DIAGNOSIS — J06.9 VIRAL URI: ICD-10-CM

## 2022-11-30 LAB
EXP DATE SOLUTION: NORMAL
EXP DATE SWAB: NORMAL
EXPIRATION DATE: NORMAL
LOT NUMBER POC: NORMAL
LOT NUMBER SOLUTION: NORMAL
LOT NUMBER SWAB: NORMAL
SARS-COV-2 RNA, POC: NEGATIVE

## 2022-11-30 PROCEDURE — 99213 OFFICE O/P EST LOW 20 MIN: CPT | Performed by: FAMILY MEDICINE

## 2022-11-30 PROCEDURE — 3074F SYST BP LT 130 MM HG: CPT | Performed by: FAMILY MEDICINE

## 2022-11-30 PROCEDURE — G8417 CALC BMI ABV UP PARAM F/U: HCPCS | Performed by: FAMILY MEDICINE

## 2022-11-30 PROCEDURE — 1090F PRES/ABSN URINE INCON ASSESS: CPT | Performed by: FAMILY MEDICINE

## 2022-11-30 PROCEDURE — G8427 DOCREV CUR MEDS BY ELIG CLIN: HCPCS | Performed by: FAMILY MEDICINE

## 2022-11-30 PROCEDURE — 1036F TOBACCO NON-USER: CPT | Performed by: FAMILY MEDICINE

## 2022-11-30 PROCEDURE — 3078F DIAST BP <80 MM HG: CPT | Performed by: FAMILY MEDICINE

## 2022-11-30 PROCEDURE — G8399 PT W/DXA RESULTS DOCUMENT: HCPCS | Performed by: FAMILY MEDICINE

## 2022-11-30 PROCEDURE — G8484 FLU IMMUNIZE NO ADMIN: HCPCS | Performed by: FAMILY MEDICINE

## 2022-11-30 PROCEDURE — 3017F COLORECTAL CA SCREEN DOC REV: CPT | Performed by: FAMILY MEDICINE

## 2022-11-30 PROCEDURE — 1123F ACP DISCUSS/DSCN MKR DOCD: CPT | Performed by: FAMILY MEDICINE

## 2022-11-30 PROCEDURE — 87635 SARS-COV-2 COVID-19 AMP PRB: CPT | Performed by: FAMILY MEDICINE

## 2022-11-30 ASSESSMENT — ENCOUNTER SYMPTOMS
RHINORRHEA: 1
EYE ITCHING: 1
EYE PAIN: 0
ABDOMINAL PAIN: 0
COUGH: 1
SHORTNESS OF BREATH: 1
EYE DISCHARGE: 0
EYE REDNESS: 1
DIARRHEA: 0
NAUSEA: 0
VOMITING: 0
WHEEZING: 0
SINUS PAIN: 0
SORE THROAT: 1

## 2022-11-30 NOTE — TELEPHONE ENCOUNTER
Location of patient: [address] [place] [address]    Received call from Jimmy Montaño at [employer] with [employer]. Subjective: Caller states \"I've had a few nose bleeds yesterday. I've been getting SOB after coughing. I've recently had my flu shot and my COVID booster. I've also got some very itchy skin beneath breasts and all across my back. I can't get anything up when I cough, it feels stuck in there. The cough is painful. I'm wheezing after I cough. \"     Onset:   3 days    Pain Severity: mild to moderate pain from all the coughing    Temperature: no fever     What has been tried: Sinex nose spray, gargling, cough drops and cold and flu liquid med with DM in it    Recommended disposition: See HCP within 4 Hours (or PCP triage) - Service Expert, Flor, has found an appt for pt at 1500. Triage RN asked to please get the pt in. Also, that time is able to be done by pt. Care advice provided, patient verbalizes understanding; denies any other questions or concerns; instructed to call back for any new or worsening symptoms. Flor, Service Expert, is working to get an appt for pt. Attention Provider: Thank you for allowing me to participate in the care of your patient. The patient was connected to triage in response to information provided to the ECC/PSC. Please do not respond through this encounter as the response is not directed to a shared pool.         Reason for Disposition   Wheezing is present    Protocols used: Cough - Acute Non-Productive-ADULT-AH

## 2022-11-30 NOTE — PROGRESS NOTES
[address] [facility]  [address], [address] [facility] [address]   No:  [phone]  Fax:  [phone]        Assessment/Plan:   Mukesh Soni was seen today for cough. Diagnoses and all orders for this visit:    Cough, unspecified type  -     AMB POC COVID-19 COV    Viral URI  COVID-19 Negative  -Reassured URI should be self-limited; continue supportive OTC cold medicines prn + rest  -informed patient to watch for signs of worsening infection (new fever, significant worsening of symptoms)          Lisbeth Acosta is a 73 y.o. female who is seen for evaluation of cough and SOB. Chief Complaint   Patient presents with    Cough     Cough, wheezing, itching chest and back, pt denies rash. Body aches and fatigue  Pt denies fever. Onset 11/26/2022  COVID vaccine 11/18/22         HPI:   Bronwen Klinefelter is a 71yo female who presents to [facility] with upper respiratory symptoms since Saturday. This morning, she was experiencing some SOB when she woke up and decided to contact her PCP for evaluation. Her primary symptoms consist of nonproductive cough, sneezing, and nasal congestion/drainage. She also says her eyes and ears feel \"itchy. \" Also has sore throat. No fever or chills. No headache. No chest pain or palpitations. Not currently experiencing SOB in office. Also says she had a nosebleed yesterday, but states that these are common for her in winter. Patient has been using OTC cough drops, cough syrup, and nasal saline rinse with some improvement of symptoms. Review of Systems:  Review of Systems   Constitutional:  Negative for chills and fever. HENT:  Positive for congestion, nosebleeds, rhinorrhea, sneezing and sore throat. Negative for ear discharge, ear pain and sinus pain. Eyes:  Positive for redness and itching. Negative for pain and discharge. Respiratory:  Positive for cough and shortness of breath. Negative for wheezing.     Cardiovascular:  Negative for chest pain and palpitations. Gastrointestinal:  Negative for abdominal pain, diarrhea, nausea and vomiting. History:  Past Medical History:   Diagnosis Date    Anemia     no supplement currently     Anxiety     Arthritis     Breast cancer ([facility] [address] 2016    right     Ductal carcinoma in situ (DCIS) of right breast 7/2016    Enlarged uterus 5/31/2016    Hypertension     on med for control     Murmur, functional     diagnosed as teenager; \"functional murmur\" per pt.; last echo 09//26/18    Obesity 05/31/2016    BMI=33.1    Osteopenia     Paroxysmal atrial fibrillation ([facility] [address] 09/11/2001    followed by [address] [facility] [address] Cardiology; on carvedilol     Psychiatric disorder     anxiety     Radiation therapy complication     Sleep apnea 09/11/2013    does not tolerate c-pap; instructed to bring dos. Thickened endometrium     2019 -D&C    Vaginal atrophy 5/31/2016       Past Surgical History:   Procedure Laterality Date    BREAST BIOPSY Right 8/26/2016    RIGHT BREAST NEEDLE LOCALIZED BIOPSY performed by Ananth Ascencio MD     BREAST LUMPECTOMY Right 8/26/2016    RIGHT BREAST LUMPECTOMY performed by Ananth Ascencio MD     BREAST LUMPECTOMY Right 9/23/2016    RIGHT BREAST LUMPECTOMY/PARTIAL REVISION  performed by Ananth Ascencio    COLONOSCOPY  08/28/2019    5 yr recall.   TA    COLONOSCOPY  2007    diverticulosis    DILATION AND CURETTAGE OF UTERUS  05/2019    HARVEY BIOPSY BREAST STEREOTACTIC Right 7.19.16    ORTHOPEDIC SURGERY      left wrist ORIF    TUBAL LIGATION      VASCULAR SURGERY      port has been removed        Current Outpatient Medications   Medication Sig Dispense Refill    vitamin D (CHOLECALCIFEROL) 25 MCG (1000 UT) TABS tablet Take 1,000 Units by mouth in the morning and at bedtime      escitalopram (LEXAPRO) 5 MG tablet Take 1 tablet by mouth daily 30 tablet 1    carvedilol (COREG) 25 MG tablet Take 25 mg by mouth 2 times daily      LORazepam (ATIVAN) 0.5 MG tablet Take 1 tablet by mouth daily as needed for Anxiety for up to 30 days. 7 tablet 0    acetaminophen (TYLENOL) 650 MG extended release tablet Take 1,300 mg by mouth every 8 hours as needed      diphenhydrAMINE (BENADRYL) 25 MG capsule Take 25 mg by mouth every 6 hours as needed      rosuvastatin (CRESTOR) 20 MG tablet Take 20 mg by mouth      triamterene-hydroCHLOROthiazide (MAXZIDE-25) 37.5-25 MG per tablet Take 1 tablet by mouth daily       No current facility-administered medications for this visit. Immunization History   Administered Date(s) Administered    COVID-19, MODERNA BLUE border, Primary or Immunocompromised, (age 12y+), IM, 100 mcg/0.5mL 03/16/2021, 04/13/2021, 12/21/2021    COVID-19, MODERNA Bivalent BOOSTER, (age 12y+), IM, 50 mcg/0.5 mL 11/19/2022    Influenza Virus Vaccine 11/15/2016, 10/01/2018, 10/03/2019    Influenza, FLUAD, (age 72 y+), Adjuvanted, 0.5mL 11/08/2022    Influenza, FLUARIX, FLULAVAL, FLUZONE (age 10 mo+) AND AFLURIA, (age 1 y+), PF, 0.5mL 11/15/2016, 10/01/2018, 10/03/2019    Pneumococcal Polysaccharide ([id number]) 10/03/2016, 05/19/2022    Tdap (Boostrix, Adacel) 07/19/2017    Zoster Recombinant (Shingrix) 10/01/2018             Vitals:    Vitals:    11/30/22 1450   BP: 132/74   Site: Left Upper Arm   Position: Sitting   Pulse: 82   Resp: 16   Temp: 98.6 °F (37 °C)   TempSrc: Oral   SpO2: 96%   Weight: 193 lb (87.5 kg)   Height: 5' 3\" (1.6 m)          Physical Exam:  Physical Exam  Constitutional:       General: She is not in acute distress. Appearance: Normal appearance. She is obese. HENT:      Head: Normocephalic and atraumatic. Right Ear: Tympanic membrane and external ear normal.      Left Ear: Tympanic membrane and external ear normal.      Nose: Nose normal.   Eyes:      Comments: +BL conjunctival erythema. No drainage. Cardiovascular:      Rate and Rhythm: Normal rate and regular rhythm. Heart sounds: Murmur heard.       Comments: Grade II systolic murmur best appreciated over right sternal border. Pulmonary:      Effort: Pulmonary effort is normal. No respiratory distress. Breath sounds: Normal breath sounds. No wheezing. Comments: +cough during examination. Musculoskeletal:      Cervical back: Neck supple. Neurological:      General: No focal deficit present. Mental Status: She is alert. Psychiatric:         Mood and Affect: Mood normal.         Behavior: Behavior normal.           Jenna Bhat    This note was generated using Dragon voice recognition software.   There may be medical errors due to computer generated translation

## 2022-12-01 NOTE — PROGRESS NOTES
[facility],[address], DO  [address], [address] [facility] [address]  Ph No:  [phone]  Fax:  [phone]        Assessment/Plan:   Mukesh Soni was seen today for cough. Diagnoses and all orders for this visit:    Cough, acute  -     AMB POC COVID-19 COV    Viral URI  Rapid COVID is negative. Reassured patient no evidence of bacterial infection. Advised supportive care. Ongoing over-the-counter cough medication as she has been using. Advise returning to clinic or going to the ER urgent care with concerning symptoms such as elevated fever, excessive purulent drainage or mucus or significant shortness of breath            Lisbeth Acosta is a 73 y.o. female who is seen for evaluation of cough and SOB. Chief Complaint   Patient presents with    Cough     Cough, wheezing, itching chest and back, pt denies rash. Body aches and fatigue  Pt denies fever. Onset 11/26/2022  COVID vaccine 11/18/22         HPI:   She is here today with concerns for upper respiratory symptoms since Saturday. This morning, she was experiencing some SOB when she woke up and decided to contact her PCP for evaluation. Her primary symptoms consist of nonproductive cough, sneezing, and nasal congestion/drainage. She also says her eyes and ears feel \"itchy. \" Also has sore throat. No fever or chills. No headache. No chest pain or palpitations. Not currently experiencing SOB in office. Also says she had a nosebleed yesterday, but states that these are common for her in winter. Patient has been using OTC cough drops, cough syrup, and nasal saline rinse with some improvement of symptoms. States he just does not feel well. Review of Systems:  Review of Systems   Constitutional:  Negative for chills and fever. HENT:  Positive for congestion, nosebleeds, rhinorrhea, sneezing and sore throat. Negative for ear discharge, ear pain and sinus pain. Eyes:  Positive for redness and itching.  Negative for pain and discharge. Respiratory:  Positive for cough and shortness of breath. Negative for wheezing. Cardiovascular:  Negative for chest pain and palpitations. Gastrointestinal:  Negative for abdominal pain, diarrhea, nausea and vomiting. History:  Past Medical History:   Diagnosis Date    Anemia     no supplement currently     Anxiety     Arthritis     Breast cancer ([facility] [address] 2016    right     Ductal carcinoma in situ (DCIS) of right breast 7/2016    Enlarged uterus 5/31/2016    Hypertension     on med for control     Murmur, functional     diagnosed as teenager; \"functional murmur\" per pt.; last echo 09//26/18    Obesity 05/31/2016    BMI=33.1    Osteopenia     Paroxysmal atrial fibrillation ([facility] [address] 09/11/2001    [place] by [place] [facility]; on carvedilol     Psychiatric disorder     anxiety     Radiation therapy complication     Sleep apnea 09/11/2013    does not tolerate c-pap; instructed to bring dos. Thickened endometrium     2019 -D&C    Vaginal atrophy 5/31/2016       Past Surgical History:   Procedure Laterality Date    BREAST BIOPSY Right 8/26/2016    RIGHT BREAST NEEDLE LOCALIZED BIOPSY performed by Mariusz Greer MD     BREAST LUMPECTOMY Right 8/26/2016    RIGHT BREAST LUMPECTOMY performed by Mariusz Greer MD     BREAST LUMPECTOMY Right 9/23/2016    RIGHT BREAST LUMPECTOMY/PARTIAL REVISION  performed by Mariusz Greer    COLONOSCOPY  08/28/2019    5 yr recall.   TA    COLONOSCOPY  2007    diverticulosis    DILATION AND CURETTAGE OF UTERUS  05/2019    HARVEY BIOPSY BREAST STEREOTACTIC Right 7.19.16    ORTHOPEDIC SURGERY      left wrist ORIF    TUBAL LIGATION      VASCULAR SURGERY      port has been removed        Current Outpatient Medications   Medication Sig Dispense Refill    vitamin D (CHOLECALCIFEROL) 25 MCG (1000 UT) TABS tablet Take 1,000 Units by mouth in the morning and at bedtime      escitalopram (LEXAPRO) 5 MG tablet Take 1 tablet by mouth daily 30 tablet 1    carvedilol (COREG) 25 MG tablet Take 25 mg by mouth 2 times daily      LORazepam (ATIVAN) 0.5 MG tablet Take 1 tablet by mouth daily as needed for Anxiety for up to 30 days. 7 tablet 0    acetaminophen (TYLENOL) 650 MG extended release tablet Take 1,300 mg by mouth every 8 hours as needed      diphenhydrAMINE (BENADRYL) 25 MG capsule Take 25 mg by mouth every 6 hours as needed      rosuvastatin (CRESTOR) 20 MG tablet Take 20 mg by mouth      triamterene-hydroCHLOROthiazide (MAXZIDE-25) 37.5-25 MG per tablet Take 1 tablet by mouth daily       No current facility-administered medications for this visit. Immunization History   Administered Date(s) Administered    COVID-19, MODERNA BLUE border, Primary or Immunocompromised, (age 12y+), IM, 100 mcg/0.5mL 03/16/2021, 04/13/2021, 12/21/2021    COVID-19, MODERNA Bivalent BOOSTER, (age 12y+), IM, 50 mcg/0.5 mL 11/19/2022    Influenza Virus Vaccine 11/15/2016, 10/01/2018, 10/03/2019    Influenza, FLUAD, (age 72 y+), Adjuvanted, 0.5mL 11/08/2022    Influenza, FLUARIX, FLULAVAL, FLUZONE (age 10 mo+) AND AFLURIA, (age 1 y+), PF, 0.5mL 11/15/2016, 10/01/2018, 10/03/2019    Pneumococcal Polysaccharide ([id number]) 10/03/2016, 05/19/2022    Tdap (Boostrix, Adacel) 07/19/2017    Zoster Recombinant (Shingrix) 10/01/2018             Vitals:    Vitals:    11/30/22 1450   BP: 132/74   Site: Left Upper Arm   Position: Sitting   Pulse: 82   Resp: 16   Temp: 98.6 °F (37 °C)   TempSrc: Oral   SpO2: 96%   Weight: 193 lb (87.5 kg)   Height: 5' 3\" (1.6 m)          Physical Exam:  Physical Exam  Constitutional:       General: She is not in acute distress. Appearance: Normal appearance. HENT:      Head: Normocephalic and atraumatic. Right Ear: Tympanic membrane and external ear normal.      Left Ear: Tympanic membrane and external ear normal.      Nose: Rhinorrhea present. Right Turbinates: Swollen. Left Turbinates: Swollen.    Cardiovascular:      Rate and Rhythm: Normal rate and regular rhythm. Heart sounds: No murmur heard. Pulmonary:      Effort: Pulmonary effort is normal. No respiratory distress. Breath sounds: Normal breath sounds. No wheezing. Musculoskeletal:      Cervical back: Neck supple. Neurological:      General: No focal deficit present. Mental Status: She is alert. Psychiatric:         Mood and Affect: Mood normal.         Behavior: Behavior normal.           Serjio Oscar DO    This note was generated using Dragon voice recognition software.   There may be medical errors due to computer generated translation

## 2022-12-20 ENCOUNTER — OFFICE VISIT (OUTPATIENT)
Dept: FAMILY MEDICINE CLINIC | Facility: CLINIC | Age: 67
End: 2022-12-20
Payer: MEDICARE

## 2022-12-20 VITALS
HEIGHT: 63 IN | DIASTOLIC BLOOD PRESSURE: 74 MMHG | RESPIRATION RATE: 16 BRPM | BODY MASS INDEX: 33.49 KG/M2 | SYSTOLIC BLOOD PRESSURE: 128 MMHG | WEIGHT: 189 LBS | OXYGEN SATURATION: 96 % | HEART RATE: 67 BPM

## 2022-12-20 DIAGNOSIS — R73.09 ELEVATED GLUCOSE: ICD-10-CM

## 2022-12-20 DIAGNOSIS — G47.33 OBSTRUCTIVE SLEEP APNEA SYNDROME: ICD-10-CM

## 2022-12-20 DIAGNOSIS — I10 ESSENTIAL HYPERTENSION: Primary | ICD-10-CM

## 2022-12-20 DIAGNOSIS — E66.09 CLASS 1 OBESITY DUE TO EXCESS CALORIES WITH SERIOUS COMORBIDITY AND BODY MASS INDEX (BMI) OF 33.0 TO 33.9 IN ADULT: ICD-10-CM

## 2022-12-20 DIAGNOSIS — E78.2 MIXED HYPERLIPIDEMIA: ICD-10-CM

## 2022-12-20 DIAGNOSIS — F41.9 ANXIETY: ICD-10-CM

## 2022-12-20 PROCEDURE — 99214 OFFICE O/P EST MOD 30 MIN: CPT | Performed by: FAMILY MEDICINE

## 2022-12-20 PROCEDURE — G8399 PT W/DXA RESULTS DOCUMENT: HCPCS | Performed by: FAMILY MEDICINE

## 2022-12-20 PROCEDURE — 3078F DIAST BP <80 MM HG: CPT | Performed by: FAMILY MEDICINE

## 2022-12-20 PROCEDURE — G8427 DOCREV CUR MEDS BY ELIG CLIN: HCPCS | Performed by: FAMILY MEDICINE

## 2022-12-20 PROCEDURE — 1036F TOBACCO NON-USER: CPT | Performed by: FAMILY MEDICINE

## 2022-12-20 PROCEDURE — G8484 FLU IMMUNIZE NO ADMIN: HCPCS | Performed by: FAMILY MEDICINE

## 2022-12-20 PROCEDURE — 1090F PRES/ABSN URINE INCON ASSESS: CPT | Performed by: FAMILY MEDICINE

## 2022-12-20 PROCEDURE — 1123F ACP DISCUSS/DSCN MKR DOCD: CPT | Performed by: FAMILY MEDICINE

## 2022-12-20 PROCEDURE — 3017F COLORECTAL CA SCREEN DOC REV: CPT | Performed by: FAMILY MEDICINE

## 2022-12-20 PROCEDURE — 3074F SYST BP LT 130 MM HG: CPT | Performed by: FAMILY MEDICINE

## 2022-12-20 PROCEDURE — G8417 CALC BMI ABV UP PARAM F/U: HCPCS | Performed by: FAMILY MEDICINE

## 2022-12-20 RX ORDER — ESCITALOPRAM OXALATE 5 MG/1
5 TABLET ORAL DAILY
Qty: 90 TABLET | Refills: 1 | Status: SHIPPED | OUTPATIENT
Start: 2022-12-20

## 2022-12-20 RX ORDER — TRIAMTERENE AND HYDROCHLOROTHIAZIDE 37.5; 25 MG/1; MG/1
1 TABLET ORAL DAILY
Qty: 90 TABLET | Refills: 1 | Status: SHIPPED | OUTPATIENT
Start: 2022-12-20

## 2022-12-20 RX ORDER — CARVEDILOL 25 MG/1
25 TABLET ORAL 2 TIMES DAILY
Qty: 180 TABLET | Refills: 1 | Status: SHIPPED | OUTPATIENT
Start: 2022-12-20

## 2022-12-20 RX ORDER — ROSUVASTATIN CALCIUM 20 MG/1
20 TABLET, COATED ORAL DAILY
Qty: 90 TABLET | Refills: 1 | Status: SHIPPED | OUTPATIENT
Start: 2022-12-20

## 2022-12-20 ASSESSMENT — ENCOUNTER SYMPTOMS
BLOOD IN STOOL: 0
ABDOMINAL PAIN: 0
SHORTNESS OF BREATH: 0
COUGH: 0
NAUSEA: 0
VOMITING: 0

## 2022-12-20 NOTE — PROGRESS NOTES
[facility],[address], DO  [address], [address] [facility] [address]   No:  [phone]  Fax:  [phone]        Assessment/Plan:   Jessee Perdomo was seen today for anxiety and referral - general.    Diagnoses and all orders for this visit:    Essential hypertension  Controlled. Continue carvedilol twice daily, triamterene-HCTZ. Lab work before next appointment. -     carvedilol (COREG) 25 MG tablet; Take 1 tablet by mouth 2 times daily For BP  -     triamterene-hydroCHLOROthiazide (MAXZIDE-25) 37.5-25 MG per tablet; Take 1 tablet by mouth daily For BP  -     CBC with Auto Differential; Future  -     Comprehensive Metabolic Panel; Future  -     Lipid Panel; Future    Anxiety  Improved. Controlled. Continue Lexapro. -     escitalopram (LEXAPRO) 5 MG tablet; Take 1 tablet by mouth daily For anxiety    Mixed hyperlipidemia  She had labs a month ago. We will repeat labs before next appointment. Continue rosuvastatin. -     rosuvastatin (CRESTOR) 20 MG tablet; Take 1 tablet by mouth daily For cholesterol  -     Lipid Panel; Future    Obstructive sleep apnea syndrome  History of RAFA. Last saw sleep medicine in 2017. She has been without CPAP for the past year. Referring patient back to sleep lab to assess need to continue/restart CPAP  -     [facility]    Class 1 obesity due to excess calories with serious comorbidity and body mass index (BMI) of 33.0 to 33.9 in adult  She would like to lose weight before next appointment. She has a plan in place. Also advised her with information on weight loss    Elevated glucose  Mild elevation in sugar in the past.  Await A1c.  -     Hemoglobin A1C; Future            Clem Harris is a 73 y.o. female who is seen for evaluation of   Chief Complaint   Patient presents with    Anxiety    Referral - General     Referral for Sleep Medicine for sleep study        HPI:   She has history of sleep apnea.   She has not used CPAP in over a year. She last saw sleep medicine in 2017. She states that the CPAP she has had been recalled. She states her  has the same on and he did receive notice from the [place] that has has been recalled. She is not sure she even has all parts. She is also here today to follow-up anxiety. She does feel that the Lexapro has helped her quite a bit cope with some recent stressful situations. She does feel that the dose is appropriate. She would like to continue this. She is not using lorazepam anymore before appointments. She previously was using this due to stress especially before her oncology appointments. She wants to lose weight before her next appointment. She states she is looking into walking at the gym and she is good about consuming vegetables and she has a garden so this is helpful for her. Review of Systems:  Review of Systems   Constitutional:  Negative for chills, fever and unexpected weight change. Respiratory:  Negative for cough and shortness of breath. Cardiovascular:  Negative for chest pain and leg swelling. Gastrointestinal:  Negative for abdominal pain, blood in stool, nausea and vomiting. Psychiatric/Behavioral:  The patient is not nervous/anxious. History:  Past Medical History:   Diagnosis Date    Anemia     no supplement currently     Anxiety     Arthritis     Breast cancer ([address] 2016    right     Ductal carcinoma in situ (DCIS) of right breast 7/2016    Enlarged uterus 5/31/2016    Hypertension     on med for control     Murmur, functional     diagnosed as teenager; \"functional murmur\" per pt.; last echo 09//26/18    Obesity 05/31/2016    BMI=33.1    Osteopenia     Paroxysmal atrial fibrillation ([address] 09/11/2001    followed by [facility]; on carvedilol     Psychiatric disorder     anxiety     Radiation therapy complication     Sleep apnea 09/11/2013    does not tolerate c-pap; instructed to bring dos.      Thickened endometrium     2019 -D&C    Vaginal atrophy 5/31/2016       Past Surgical History:   Procedure Laterality Date    BREAST BIOPSY Right 8/26/2016    RIGHT BREAST NEEDLE LOCALIZED BIOPSY performed by Maxime Lozano MD     BREAST LUMPECTOMY Right 8/26/2016    RIGHT BREAST LUMPECTOMY performed by Maxime Lozano MD     BREAST LUMPECTOMY Right 9/23/2016    RIGHT BREAST LUMPECTOMY/PARTIAL REVISION  performed by Maxime Lozano    COLONOSCOPY  08/28/2019    5 yr recall. TA    COLONOSCOPY  2007    diverticulosis    DILATION AND CURETTAGE OF UTERUS  05/2019    HARVEY BIOPSY BREAST STEREOTACTIC Right 7.19.16    ORTHOPEDIC SURGERY      left wrist ORIF    TUBAL LIGATION      VASCULAR SURGERY      port has been removed        Current Outpatient Medications   Medication Sig Dispense Refill    carvedilol (COREG) 25 MG tablet Take 1 tablet by mouth 2 times daily For  tablet 1    rosuvastatin (CRESTOR) 20 MG tablet Take 1 tablet by mouth daily For cholesterol 90 tablet 1    triamterene-hydroCHLOROthiazide (MAXZIDE-25) 37.5-25 MG per tablet Take 1 tablet by mouth daily For BP 90 tablet 1    escitalopram (LEXAPRO) 5 MG tablet Take 1 tablet by mouth daily For anxiety 90 tablet 1    vitamin D (CHOLECALCIFEROL) 25 MCG (1000 UT) TABS tablet Take 1,000 Units by mouth in the morning and at bedtime      acetaminophen (TYLENOL) 650 MG extended release tablet Take 1,300 mg by mouth every 8 hours as needed      diphenhydrAMINE (BENADRYL) 25 MG capsule Take 25 mg by mouth every 6 hours as needed       No current facility-administered medications for this visit.        Immunization History   Administered Date(s) Administered    COVID-19, MODERNA BLUE border, Primary or Immunocompromised, (age 12y+), IM, 100 mcg/0.5mL 03/16/2021, 04/13/2021, 12/21/2021    COVID-19, MODERNA Bivalent BOOSTER, (age 12y+), IM, 50 mcg/0.5 mL 11/19/2022    Influenza Virus Vaccine 11/15/2016, 10/01/2018, 10/03/2019    Influenza, FLUAD, (age 72 y+), Adjuvanted, 0.5mL 11/08/2022 Influenza, [address], Paddy Generous (age 10 mo+) AND AFLURIA, (age 1 y+), PF, 0.5mL 11/15/2016, 10/01/2018, 10/03/2019    Pneumococcal Polysaccharide ([id number]) 10/03/2016, 05/19/2022    Tdap (Boostrix, Adacel) 07/19/2017    Zoster Recombinant (Shingrix) 10/01/2018             Vitals:    Vitals:    12/20/22 0940   BP: 128/74   Site: Left Upper Arm   Position: Sitting   Pulse: 67   Resp: 16   SpO2: 96%   Weight: 189 lb (85.7 kg)   Height: 5' 3\" (1.6 m)          Physical Exam:  Physical Exam  Vitals reviewed. Constitutional:       Appearance: Normal appearance. HENT:      Head: Normocephalic and atraumatic. Cardiovascular:      Rate and Rhythm: Normal rate and regular rhythm. Heart sounds: Murmur heard. Pulmonary:      Effort: Pulmonary effort is normal. No respiratory distress. Breath sounds: No wheezing. Musculoskeletal:      Cervical back: Neck supple. Neurological:      Mental Status: She is alert. Psychiatric:         Mood and Affect: Mood normal.         Behavior: Behavior normal.           Phan Nava DO    This note was generated using Dragon voice recognition software.   There may be medical errors due to computer generated translation

## 2022-12-20 NOTE — PATIENT INSTRUCTIONS
Patient Education        Starting a Weight Loss Plan: Care Instructions  Overview     If you're thinking about losing weight, it can be hard to know where to start. Your doctor can help you set up a weight loss plan that best meets your needs. You may want to take a class on nutrition or exercise, or you could join a weight loss support group. If you have questions about how to make changes to your eating or exercise habits, ask your doctor about seeing a registered dietitian or an exercise specialist.  It can be a big challenge to lose weight. But you don't have to make huge changes at once. Make small changes, and stick with them. When those changes become habit, add a few more changes. If you don't think you're ready to make changes right now, try to pick a date in the future. Make an appointment to see your doctor to discuss whether the time is right for you to start a plan. Follow-up care is a key part of your treatment and safety. Be sure to make and go to all appointments, and call your doctor if you are having problems. It's also a good idea to know your test results and keep a list of the medicines you take. How can you care for yourself at home? Set realistic goals. Many people expect to lose much more weight than is likely. A weight loss of 5% to 10% of your body weight may be enough to improve your health. Get family and friends involved to provide support. Talk to them about why you are trying to lose weight, and ask them to help. They can help by participating in exercise and having meals with you, even if they may be eating something different. Find what works best for you. If you do not have time or do not like to cook, a program that offers meal replacement bars or shakes may be better for you. Or if you like to prepare meals, finding a plan that includes daily menus and recipes may be best.  Ask your doctor about other health professionals who can help you achieve your weight loss goals.   A dietitian can help you make healthy changes in your diet. An exercise specialist or  can help you develop a safe and effective exercise program.  A counselor or psychiatrist can help you cope with issues such as depression, anxiety, or family problems that can make it hard to focus on weight loss. Consider joining a support group for people who are trying to lose weight. Your doctor can suggest groups in your area. Where can you learn more? Go to http://www.woods.com/ and enter U357 to learn more about \"Starting a Weight Loss Plan: Care Instructions. \"  Current as of: August 25, 2022               Content Version: 13.5  © 8157-8329 [employer], [employer]. Care instructions adapted under license by [facility] ([facility]). If you have questions about a medical condition or this instruction, always ask your healthcare professional. [address] any warranty or liability for your use of this information.

## 2022-12-29 ENCOUNTER — OFFICE VISIT (OUTPATIENT)
Dept: FAMILY MEDICINE CLINIC | Facility: CLINIC | Age: 67
End: 2022-12-29
Payer: MEDICARE

## 2022-12-29 ENCOUNTER — NURSE TRIAGE (OUTPATIENT)
Dept: OTHER | Facility: CLINIC | Age: 67
End: 2022-12-29

## 2022-12-29 VITALS
DIASTOLIC BLOOD PRESSURE: 74 MMHG | WEIGHT: 190 LBS | BODY MASS INDEX: 33.66 KG/M2 | OXYGEN SATURATION: 97 % | SYSTOLIC BLOOD PRESSURE: 132 MMHG | TEMPERATURE: 99.2 F | HEART RATE: 87 BPM | RESPIRATION RATE: 16 BRPM | HEIGHT: 63 IN

## 2022-12-29 DIAGNOSIS — J20.5 ACUTE BRONCHITIS DUE TO RESPIRATORY SYNCYTIAL VIRUS (RSV): Primary | ICD-10-CM

## 2022-12-29 DIAGNOSIS — R52 BODY ACHES: ICD-10-CM

## 2022-12-29 LAB
EXP DATE SOLUTION: NORMAL
EXP DATE SWAB: NORMAL
EXPIRATION DATE: NORMAL
INFLUENZA A ANTIGEN, POC: NEGATIVE
INFLUENZA B ANTIGEN, POC: NEGATIVE
LOT NUMBER POC: NORMAL
LOT NUMBER SOLUTION: NORMAL
LOT NUMBER SWAB: NORMAL
RSV, POC: POSITIVE
SARS-COV-2 RNA, POC: NEGATIVE
VALID INTERNAL CONTROL, POC: YES
VALID INTERNAL CONTROL: YES

## 2022-12-29 PROCEDURE — G8417 CALC BMI ABV UP PARAM F/U: HCPCS | Performed by: FAMILY MEDICINE

## 2022-12-29 PROCEDURE — 1090F PRES/ABSN URINE INCON ASSESS: CPT | Performed by: FAMILY MEDICINE

## 2022-12-29 PROCEDURE — 3078F DIAST BP <80 MM HG: CPT | Performed by: FAMILY MEDICINE

## 2022-12-29 PROCEDURE — 87804 INFLUENZA ASSAY W/OPTIC: CPT | Performed by: FAMILY MEDICINE

## 2022-12-29 PROCEDURE — 99214 OFFICE O/P EST MOD 30 MIN: CPT | Performed by: FAMILY MEDICINE

## 2022-12-29 PROCEDURE — G8399 PT W/DXA RESULTS DOCUMENT: HCPCS | Performed by: FAMILY MEDICINE

## 2022-12-29 PROCEDURE — 87635 SARS-COV-2 COVID-19 AMP PRB: CPT | Performed by: FAMILY MEDICINE

## 2022-12-29 PROCEDURE — 1123F ACP DISCUSS/DSCN MKR DOCD: CPT | Performed by: FAMILY MEDICINE

## 2022-12-29 PROCEDURE — 1036F TOBACCO NON-USER: CPT | Performed by: FAMILY MEDICINE

## 2022-12-29 PROCEDURE — G8484 FLU IMMUNIZE NO ADMIN: HCPCS | Performed by: FAMILY MEDICINE

## 2022-12-29 PROCEDURE — G8427 DOCREV CUR MEDS BY ELIG CLIN: HCPCS | Performed by: FAMILY MEDICINE

## 2022-12-29 PROCEDURE — 3017F COLORECTAL CA SCREEN DOC REV: CPT | Performed by: FAMILY MEDICINE

## 2022-12-29 PROCEDURE — 3074F SYST BP LT 130 MM HG: CPT | Performed by: FAMILY MEDICINE

## 2022-12-29 PROCEDURE — 87420 RESP SYNCYTIAL VIRUS AG IA: CPT | Performed by: FAMILY MEDICINE

## 2022-12-29 RX ORDER — ALBUTEROL SULFATE 90 UG/1
2 AEROSOL, METERED RESPIRATORY (INHALATION) 4 TIMES DAILY PRN
Qty: 1 EACH | Refills: 0 | Status: SHIPPED | OUTPATIENT
Start: 2022-12-29 | End: 2023-01-12

## 2022-12-29 ASSESSMENT — ENCOUNTER SYMPTOMS
ABDOMINAL PAIN: 0
COUGH: 1
DIARRHEA: 0
SINUS PAIN: 0
SORE THROAT: 0
SINUS PRESSURE: 0
SHORTNESS OF BREATH: 0
NAUSEA: 0
VOMITING: 0
WHEEZING: 1
CHEST TIGHTNESS: 1
RHINORRHEA: 0

## 2022-12-29 NOTE — PROGRESS NOTES
[address] [facility],[address], DO  [address], [address] [facility] [address]   No:  [phone]  Fax:  [phone]        Assessment/Plan:   Bucky Valerio was seen today for cough. Diagnoses and all orders for this visit:    Acute bronchitis due to respiratory syncytial virus (RSV)  POC RSV positive. Patient symptoms are consistent with bronchitis. Advised supportive care, Coricidin HBP for cough, honey for cough. Also prescribing albuterol to help with symptom relief and wheeze noted on exam.  Advise going to the ER with purulent mucus, high fever or significant shortness of breath or wheeze. -     AMB POC COVID-19 COV  -     AMB POC RAPID INFLUENZA TEST  -     AMB POC RSV  -     albuterol sulfate HFA (VENTOLIN HFA) 108 (90 Base) MCG/ACT inhaler; Inhale 2 puffs into the lungs 4 times daily as needed for Wheezing    Body aches  POC COVID and influenza negative. -     AMB POC COVID-19 COV  -     AMB POC RAPID INFLUENZA TEST  -     AMB POC RSV        Labs:  Results for orders placed or performed in visit on 12/29/22   AMB POC COVID-19 COV   Result Value Ref Range    SARS-COV-2 RNA, POC Negative     Lot number swab      EXP date swab      Lot number solution      EXP date solution      LOT NUMBER POC      EXPIRATION DATE     AMB POC RAPID INFLUENZA TEST   Result Value Ref Range    Valid Internal Control, POC yes     Influenza A Antigen, POC Negative Negative    Influenza B Antigen, POC Negative Negative   AMB POC RSV   Result Value Ref Range    VALID INTERNAL CONTROL yes     RSV, POC Positive Negative         Morgan Garcia is a 73 y.o. female who is seen for evaluation of   Chief Complaint   Patient presents with    Cough     Cough, congestion, fatigue, headache, body aches. Onset 12/24/22         HPI:   She is here today with gradual onset of symptoms including deep cough, thick white/clear nasal discharge and sputum, headache, body ache, fever, fatigue.   Her daughter was sick 1.5 weeks ago with COVID. Her grandson, her daughter's son, is also sick. He has not been tested yet. Her  saw the doctor yesterday as he is also sick she states he was tested and it was negative for at least flu and COVID. He was diagnosed with bronchitis. Her biggest thing is that she is coughing at night so she is having difficulty laying down and resting. Review of Systems:  Review of Systems   Constitutional:  Positive for fatigue. Negative for chills. HENT:  Positive for congestion. Negative for rhinorrhea, sinus pressure, sinus pain and sore throat. Respiratory:  Positive for cough, chest tightness and wheezing. Negative for shortness of breath. Cardiovascular:  Negative for chest pain. Gastrointestinal:  Negative for abdominal pain, diarrhea, nausea and vomiting. Musculoskeletal:  Positive for myalgias. Neurological:  Positive for headaches. History:  Past Medical History:   Diagnosis Date    Anemia     no supplement currently     Anxiety     Arthritis     Breast cancer ([facility] [address] 2016    right     Ductal carcinoma in situ (DCIS) of right breast 7/2016    Enlarged uterus 5/31/2016    Hypertension     on med for control     Murmur, functional     diagnosed as teenager; \"functional murmur\" per pt.; last echo 09//26/18    Obesity 05/31/2016    BMI=33.1    Osteopenia     Paroxysmal atrial fibrillation ([facility] [address] 09/11/2001    followed by [facility]; on carvedilol     Psychiatric disorder     anxiety     Radiation therapy complication     Sleep apnea 09/11/2013    does not tolerate c-pap; instructed to bring dos.      Thickened endometrium     2019 -D&C    Vaginal atrophy 5/31/2016       Past Surgical History:   Procedure Laterality Date    BREAST BIOPSY Right 8/26/2016    RIGHT BREAST NEEDLE LOCALIZED BIOPSY performed by Tamanna Archer MD     BREAST LUMPECTOMY Right 8/26/2016    RIGHT BREAST LUMPECTOMY performed by Tamanna Archer MD     BREAST LUMPECTOMY Right 9/23/2016 RIGHT BREAST LUMPECTOMY/PARTIAL REVISION  performed by Anthony Stevenson    COLONOSCOPY  08/28/2019    5 yr recall. TA    COLONOSCOPY  2007    diverticulosis    DILATION AND CURETTAGE OF UTERUS  05/2019    HARVEY BIOPSY BREAST STEREOTACTIC Right 7.19.16    ORTHOPEDIC SURGERY      left wrist ORIF    TUBAL LIGATION      VASCULAR SURGERY      port has been removed        Current Outpatient Medications   Medication Sig Dispense Refill    albuterol sulfate HFA (VENTOLIN HFA) 108 (90 Base) MCG/ACT inhaler Inhale 2 puffs into the lungs 4 times daily as needed for Wheezing 1 each 0    carvedilol (COREG) 25 MG tablet Take 1 tablet by mouth 2 times daily For  tablet 1    rosuvastatin (CRESTOR) 20 MG tablet Take 1 tablet by mouth daily For cholesterol 90 tablet 1    triamterene-hydroCHLOROthiazide (MAXZIDE-25) 37.5-25 MG per tablet Take 1 tablet by mouth daily For BP 90 tablet 1    escitalopram (LEXAPRO) 5 MG tablet Take 1 tablet by mouth daily For anxiety 90 tablet 1    vitamin D (CHOLECALCIFEROL) 25 MCG (1000 UT) TABS tablet Take 1,000 Units by mouth in the morning and at bedtime      acetaminophen (TYLENOL) 650 MG extended release tablet Take 1,300 mg by mouth every 8 hours as needed      diphenhydrAMINE (BENADRYL) 25 MG capsule Take 25 mg by mouth every 6 hours as needed       No current facility-administered medications for this visit.        Immunization History   Administered Date(s) Administered    COVID-19, MODERNA BLUE border, Primary or Immunocompromised, (age 12y+), IM, 100 mcg/0.5mL 03/16/2021, 04/13/2021, 12/21/2021    COVID-19, MODERNA Bivalent BOOSTER, (age 12y+), IM, 50 mcg/0.5 mL 11/19/2022    Influenza Virus Vaccine 11/15/2016, 10/01/2018, 10/03/2019    Influenza, FLUAD, (age 72 y+), Adjuvanted, 0.5mL 11/08/2022    Influenza, FLUARIX, FLULAVAL, FLUZONE (age 10 mo+) AND AFLURIA, (age 1 y+), PF, 0.5mL 11/15/2016, 10/01/2018, 10/03/2019    Pneumococcal Polysaccharide (Alfvnqgtm90) 10/03/2016, 05/19/2022 Tdap (Boostrix, Adacel) 07/19/2017    Zoster Recombinant (Shingrix) 10/01/2018             Vitals:    Vitals:    12/29/22 1415   BP: 132/74   Site: Left Upper Arm   Position: Sitting   Pulse: 87   Resp: 16   Temp: 99.2 °F (37.3 °C)   TempSrc: Oral   SpO2: 97%   Weight: 190 lb (86.2 kg)   Height: 5' 3\" (1.6 m)          Physical Exam:  Physical Exam  Vitals reviewed. Constitutional:       Appearance: Normal appearance. She is ill-appearing. HENT:      Head: Normocephalic and atraumatic. Right Ear: Tympanic membrane, ear canal and external ear normal.      Left Ear: Tympanic membrane, ear canal and external ear normal.      Nose: No rhinorrhea. Right Sinus: No maxillary sinus tenderness or frontal sinus tenderness. Left Sinus: No maxillary sinus tenderness or frontal sinus tenderness. Mouth/Throat:      Mouth: Mucous membranes are moist.      Pharynx: No oropharyngeal exudate. Tonsils: No tonsillar exudate. Cardiovascular:      Rate and Rhythm: Normal rate and regular rhythm. Heart sounds: No murmur heard. Pulmonary:      Effort: Pulmonary effort is normal. No respiratory distress. Breath sounds: Examination of the left-upper field reveals wheezing. Examination of the left-lower field reveals wheezing. Wheezing present. Comments: Barky cough    Lymphadenopathy:      Cervical: No cervical adenopathy. Skin:     General: Skin is warm. Findings: No rash. Neurological:      Mental Status: She is alert. Psychiatric:         Mood and Affect: Mood normal.         Behavior: Behavior normal.           Slick Mcallister DO    This note was generated using Dragon voice recognition software.   There may be medical errors due to computer generated translation

## 2022-12-29 NOTE — TELEPHONE ENCOUNTER
Location of patient: [place]    Received call from Σ[place]ΡΑΝΤΙ at [facility] with Red Flag Complaint. Subjective: Caller states \"These symptoms have been going on since the end of November. I got better with medicine. I got sick again right around Thi I had a fever over Thi. I have some rattling in my chest. My SOB is worse when I lay down. \"     Current Symptoms: SOB, cough, sneezing,      Onset: 4 days ago;     Associated Symptoms: NA    Pain Severity: 8/10; dull, aching; intermittent when coughing     Temperature: None     What has been tried: over the counter daytime cold and flu medicine       Recommended disposition: Go to Office Now    Care advice provided, patient verbalizes understanding; denies any other questions or concerns; instructed to call back for any new or worsening symptoms. Patient/Caller agrees with recommended disposition; writer provided warm transfer to Drake Mayes at [facility] for appointment scheduling    Attention Provider: Thank you for allowing me to participate in the care of your patient. The patient was connected to triage in response to information provided to the ECC/PSC. Please do not respond through this encounter as the response is not directed to a shared pool.       Reason for Disposition   MILD difficulty breathing (e.g., minimal/no SOB at rest, SOB with walking, pulse < 100) of new-onset or worse than normal    Additional Information   Negative: Extra heart beats OR irregular heart beating (i.e., \"palpitations\")     States she has had these for a long time    Protocols used: Breathing Difficulty-ADULT-[place]

## 2023-01-30 ENCOUNTER — HOSPITAL ENCOUNTER (OUTPATIENT)
Dept: SLEEP MEDICINE | Age: 68
Discharge: HOME | End: 2023-02-02
Payer: MEDICARE

## 2023-01-30 PROCEDURE — 95811 POLYSOM 6/>YRS CPAP 4/> PARM: CPT

## 2023-02-24 ENCOUNTER — TELEPHONE (OUTPATIENT)
Dept: ONCOLOGY | Age: 68
End: 2023-02-24

## 2023-05-22 ENCOUNTER — OFFICE VISIT (OUTPATIENT)
Dept: FAMILY MEDICINE CLINIC | Facility: CLINIC | Age: 68
End: 2023-05-22
Payer: MEDICARE

## 2023-05-22 VITALS
BODY MASS INDEX: 34.73 KG/M2 | DIASTOLIC BLOOD PRESSURE: 66 MMHG | RESPIRATION RATE: 16 BRPM | SYSTOLIC BLOOD PRESSURE: 128 MMHG | HEART RATE: 71 BPM | OXYGEN SATURATION: 96 % | HEIGHT: 63 IN | WEIGHT: 196 LBS

## 2023-05-22 DIAGNOSIS — M79.672 ACUTE FOOT PAIN, LEFT: ICD-10-CM

## 2023-05-22 DIAGNOSIS — Z00.00 INITIAL MEDICARE ANNUAL WELLNESS VISIT: ICD-10-CM

## 2023-05-22 DIAGNOSIS — E78.2 MIXED HYPERLIPIDEMIA: ICD-10-CM

## 2023-05-22 DIAGNOSIS — I47.1 SVT (SUPRAVENTRICULAR TACHYCARDIA) (HCC): ICD-10-CM

## 2023-05-22 DIAGNOSIS — R73.09 ELEVATED GLUCOSE: ICD-10-CM

## 2023-05-22 DIAGNOSIS — F41.9 ANXIETY: ICD-10-CM

## 2023-05-22 DIAGNOSIS — I10 ESSENTIAL HYPERTENSION: Primary | ICD-10-CM

## 2023-05-22 DIAGNOSIS — Z23 IMMUNIZATION DUE: ICD-10-CM

## 2023-05-22 DIAGNOSIS — Z78.0 POST-MENOPAUSAL: ICD-10-CM

## 2023-05-22 DIAGNOSIS — E66.09 CLASS 1 OBESITY DUE TO EXCESS CALORIES WITH SERIOUS COMORBIDITY AND BODY MASS INDEX (BMI) OF 33.0 TO 33.9 IN ADULT: ICD-10-CM

## 2023-05-22 PROCEDURE — G8399 PT W/DXA RESULTS DOCUMENT: HCPCS | Performed by: FAMILY MEDICINE

## 2023-05-22 PROCEDURE — 1090F PRES/ABSN URINE INCON ASSESS: CPT | Performed by: FAMILY MEDICINE

## 2023-05-22 PROCEDURE — G8427 DOCREV CUR MEDS BY ELIG CLIN: HCPCS | Performed by: FAMILY MEDICINE

## 2023-05-22 PROCEDURE — 3074F SYST BP LT 130 MM HG: CPT | Performed by: FAMILY MEDICINE

## 2023-05-22 PROCEDURE — 1036F TOBACCO NON-USER: CPT | Performed by: FAMILY MEDICINE

## 2023-05-22 PROCEDURE — G8417 CALC BMI ABV UP PARAM F/U: HCPCS | Performed by: FAMILY MEDICINE

## 2023-05-22 PROCEDURE — 1123F ACP DISCUSS/DSCN MKR DOCD: CPT | Performed by: FAMILY MEDICINE

## 2023-05-22 PROCEDURE — 99214 OFFICE O/P EST MOD 30 MIN: CPT | Performed by: FAMILY MEDICINE

## 2023-05-22 PROCEDURE — 3017F COLORECTAL CA SCREEN DOC REV: CPT | Performed by: FAMILY MEDICINE

## 2023-05-22 PROCEDURE — 3078F DIAST BP <80 MM HG: CPT | Performed by: FAMILY MEDICINE

## 2023-05-22 PROCEDURE — 90677 PCV20 VACCINE IM: CPT | Performed by: FAMILY MEDICINE

## 2023-05-22 PROCEDURE — G0438 PPPS, INITIAL VISIT: HCPCS | Performed by: FAMILY MEDICINE

## 2023-05-22 PROCEDURE — G0009 ADMIN PNEUMOCOCCAL VACCINE: HCPCS | Performed by: FAMILY MEDICINE

## 2023-05-22 RX ORDER — METOPROLOL SUCCINATE 50 MG/1
50 TABLET, EXTENDED RELEASE ORAL
Qty: 90 TABLET | Refills: 1 | Status: SHIPPED | OUTPATIENT
Start: 2023-05-22

## 2023-05-22 SDOH — ECONOMIC STABILITY: FOOD INSECURITY: WITHIN THE PAST 12 MONTHS, THE FOOD YOU BOUGHT JUST DIDN'T LAST AND YOU DIDN'T HAVE MONEY TO GET MORE.: NEVER TRUE

## 2023-05-22 SDOH — ECONOMIC STABILITY: FOOD INSECURITY: WITHIN THE PAST 12 MONTHS, YOU WORRIED THAT YOUR FOOD WOULD RUN OUT BEFORE YOU GOT MONEY TO BUY MORE.: NEVER TRUE

## 2023-05-22 SDOH — ECONOMIC STABILITY: INCOME INSECURITY: HOW HARD IS IT FOR YOU TO PAY FOR THE VERY BASICS LIKE FOOD, HOUSING, MEDICAL CARE, AND HEATING?: NOT HARD AT ALL

## 2023-05-22 SDOH — ECONOMIC STABILITY: HOUSING INSECURITY
IN THE LAST 12 MONTHS, WAS THERE A TIME WHEN YOU DID NOT HAVE A STEADY PLACE TO SLEEP OR SLEPT IN A SHELTER (INCLUDING NOW)?: NO

## 2023-05-22 ASSESSMENT — PATIENT HEALTH QUESTIONNAIRE - PHQ9
SUM OF ALL RESPONSES TO PHQ9 QUESTIONS 1 & 2: 0
SUM OF ALL RESPONSES TO PHQ QUESTIONS 1-9: 0
SUM OF ALL RESPONSES TO PHQ QUESTIONS 1-9: 0
1. LITTLE INTEREST OR PLEASURE IN DOING THINGS: 0
SUM OF ALL RESPONSES TO PHQ QUESTIONS 1-9: 0
SUM OF ALL RESPONSES TO PHQ QUESTIONS 1-9: 0
2. FEELING DOWN, DEPRESSED OR HOPELESS: 0

## 2023-05-22 ASSESSMENT — LIFESTYLE VARIABLES
HOW OFTEN DO YOU HAVE A DRINK CONTAINING ALCOHOL: NEVER
HOW MANY STANDARD DRINKS CONTAINING ALCOHOL DO YOU HAVE ON A TYPICAL DAY: PATIENT DOES NOT DRINK

## 2023-05-22 ASSESSMENT — ENCOUNTER SYMPTOMS
NAUSEA: 0
ABDOMINAL PAIN: 0
BLOOD IN STOOL: 0
VOMITING: 0
SHORTNESS OF BREATH: 0
COUGH: 0

## 2023-05-22 NOTE — PROGRESS NOTES
[address] [facility],[address], DO  [address], [address] [facility] [address]   No:  [phone]  Fax:  [phone]        Assessment/Plan:   Tae Leggett was seen today for hypertension, medicare awv, fall and medication problem. Diagnoses and all orders for this visit:    Essential hypertension  Controlled however she is having side effects from carvedilol. Stop carvedilol twice daily, switching to metoprolol succinate. Continue to monitor blood pressure and heart rate at home and follow-up in 4 weeks to reevaluate blood pressure, heart rate and symptoms. -     metoprolol succinate (TOPROL XL) 50 MG extended release tablet; Take 1 tablet by mouth nightly    SVT (supraventricular tachycardia) (HCC)  Continue beta-blocker for rate control. Elevated glucose  A1c at lab visit prior to next appointment    Mixed hyperlipidemia  Continue statin. Labs prior to next appointment. Class 1 obesity due to excess calories with serious comorbidity and body mass index (BMI) of 33.0 to 33.9 in adult  She is active. She is exercising twice a week. She states she would actually like to start running. But she feels like her joints would not tolerate it. Discussed with her that she could start slowly on this to something she could do if she truly wants to, but a brisk walk is sufficient. Recommend 4 to 5 days/week of exercise    Anxiety  Stable. Continue Lexapro. Initial Medicare annual wellness visit    Post-menopausal  -     DEXA Bone Density Axial Skeleton; Future    Immunization due  Prevnar 20 vaccine administered today. Encouraged her to consider getting shingles vaccine at local pharmacy for coverage.  -     Pneumococcal, PCV20, PREVNAR 20, (age 25 yrs+), IM, PF    Acute left foot pain. New problem  Patient's symptoms are improving. For now recommend continuing Tylenol arthritis, supportive shoes. Provided her with information on foot arthritis exercises.   No evidence of infection

## 2023-05-22 NOTE — PATIENT INSTRUCTIONS

## 2023-05-22 NOTE — PROGRESS NOTES
Medicare Annual Wellness Visit    Angela Cook is here for Hypertension, Medicare AWV, Fall [place] last month at home. Has been seeing a chiropractor for back pain. /Still having left foot swelling and right knee pain), and Medication Problem (Has been taking carvedilol twice a day since April. Now having nausea and dizziness in the mornings. /Was before April was taking once a day. )    Assessment & Plan   Essential hypertension  -     metoprolol succinate (TOPROL XL) 50 MG extended release tablet; Take 1 tablet by mouth nightly, Disp-90 tablet, R-1Normal  SVT (supraventricular tachycardia) (HCC)  Elevated glucose  Mixed hyperlipidemia  Class 1 obesity due to excess calories with serious comorbidity and body mass index (BMI) of 33.0 to 33.9 in adult  Anxiety  Initial Medicare annual wellness visit  Post-menopausal  -     DEXA Bone Density Axial Skeleton; Future  Immunization due  -     Pneumococcal, PCV20, PREVNAR 21, (age 25 yrs+), IM, PF      Recommendations for Preventive Services Due: see orders and patient instructions/AVS.  Recommended screening schedule for the next 5-10 years is provided to the patient in written form: see Patient Instructions/AVS.     Return in about 4 weeks (around 6/19/2023) for HTN-labs prior . Subjective       Patient's complete Health Risk Assessment and screening values have been reviewed and are found in Flowsheets. The following problems were reviewed today and where indicated follow up appointments were made and/or referrals ordered. Positive Risk Factor Screenings with Interventions:                 Weight and Activity:  Physical Activity: Insufficiently Active    Days of Exercise per Week: 2 days    Minutes of Exercise per Session: 60 min     On average, how many days per week do you engage in moderate to strenuous exercise (like a brisk walk)?: 2 days  Have you lost any weight without trying in the past 3 months?: No  Body mass index is 34.72 kg/m².  (!)

## 2023-06-26 ENCOUNTER — OFFICE VISIT (OUTPATIENT)
Dept: FAMILY MEDICINE CLINIC | Facility: CLINIC | Age: 68
End: 2023-06-26
Payer: MEDICARE

## 2023-06-26 VITALS
RESPIRATION RATE: 16 BRPM | WEIGHT: 198.6 LBS | HEART RATE: 88 BPM | DIASTOLIC BLOOD PRESSURE: 72 MMHG | BODY MASS INDEX: 35.19 KG/M2 | OXYGEN SATURATION: 97 % | SYSTOLIC BLOOD PRESSURE: 130 MMHG | HEIGHT: 63 IN

## 2023-06-26 DIAGNOSIS — E78.2 MIXED HYPERLIPIDEMIA: ICD-10-CM

## 2023-06-26 DIAGNOSIS — F41.9 ANXIETY: ICD-10-CM

## 2023-06-26 DIAGNOSIS — E66.01 CLASS 2 SEVERE OBESITY DUE TO EXCESS CALORIES WITH SERIOUS COMORBIDITY AND BODY MASS INDEX (BMI) OF 35.0 TO 35.9 IN ADULT (HCC): ICD-10-CM

## 2023-06-26 DIAGNOSIS — M79.671 PAIN IN BOTH FEET: ICD-10-CM

## 2023-06-26 DIAGNOSIS — I10 ESSENTIAL HYPERTENSION: Primary | ICD-10-CM

## 2023-06-26 DIAGNOSIS — R73.03 PREDIABETES: ICD-10-CM

## 2023-06-26 DIAGNOSIS — M79.672 PAIN IN BOTH FEET: ICD-10-CM

## 2023-06-26 PROCEDURE — 1036F TOBACCO NON-USER: CPT | Performed by: FAMILY MEDICINE

## 2023-06-26 PROCEDURE — G8417 CALC BMI ABV UP PARAM F/U: HCPCS | Performed by: FAMILY MEDICINE

## 2023-06-26 PROCEDURE — 99214 OFFICE O/P EST MOD 30 MIN: CPT | Performed by: FAMILY MEDICINE

## 2023-06-26 PROCEDURE — 3075F SYST BP GE 130 - 139MM HG: CPT | Performed by: FAMILY MEDICINE

## 2023-06-26 PROCEDURE — 1123F ACP DISCUSS/DSCN MKR DOCD: CPT | Performed by: FAMILY MEDICINE

## 2023-06-26 PROCEDURE — 3078F DIAST BP <80 MM HG: CPT | Performed by: FAMILY MEDICINE

## 2023-06-26 PROCEDURE — 3017F COLORECTAL CA SCREEN DOC REV: CPT | Performed by: FAMILY MEDICINE

## 2023-06-26 PROCEDURE — 1090F PRES/ABSN URINE INCON ASSESS: CPT | Performed by: FAMILY MEDICINE

## 2023-06-26 PROCEDURE — G8427 DOCREV CUR MEDS BY ELIG CLIN: HCPCS | Performed by: FAMILY MEDICINE

## 2023-06-26 PROCEDURE — G8399 PT W/DXA RESULTS DOCUMENT: HCPCS | Performed by: FAMILY MEDICINE

## 2023-06-26 RX ORDER — ROSUVASTATIN CALCIUM 40 MG/1
40 TABLET, COATED ORAL DAILY
Qty: 90 TABLET | Refills: 1 | Status: SHIPPED | OUTPATIENT
Start: 2023-06-26

## 2023-06-26 RX ORDER — TRIAMTERENE AND HYDROCHLOROTHIAZIDE 37.5; 25 MG/1; MG/1
1 TABLET ORAL DAILY
Qty: 90 TABLET | Refills: 1 | Status: SHIPPED | OUTPATIENT
Start: 2023-06-26

## 2023-06-26 RX ORDER — ESCITALOPRAM OXALATE 5 MG/1
5 TABLET ORAL DAILY
Qty: 90 TABLET | Refills: 1 | Status: SHIPPED | OUTPATIENT
Start: 2023-06-26

## 2023-06-26 ASSESSMENT — PATIENT HEALTH QUESTIONNAIRE - PHQ9
SUM OF ALL RESPONSES TO PHQ QUESTIONS 1-9: 0
SUM OF ALL RESPONSES TO PHQ QUESTIONS 1-9: 0
SUM OF ALL RESPONSES TO PHQ9 QUESTIONS 1 & 2: 0
SUM OF ALL RESPONSES TO PHQ QUESTIONS 1-9: 0
SUM OF ALL RESPONSES TO PHQ QUESTIONS 1-9: 0
2. FEELING DOWN, DEPRESSED OR HOPELESS: 0
1. LITTLE INTEREST OR PLEASURE IN DOING THINGS: 0

## 2023-06-26 ASSESSMENT — ENCOUNTER SYMPTOMS
SHORTNESS OF BREATH: 0
VOMITING: 0
NAUSEA: 0
COUGH: 0
BLOOD IN STOOL: 0
ABDOMINAL PAIN: 0

## 2023-07-03 ENCOUNTER — HOSPITAL ENCOUNTER (OUTPATIENT)
Dept: MAMMOGRAPHY | Age: 68
Discharge: HOME | End: 2023-07-06
Payer: MEDICARE

## 2023-07-03 DIAGNOSIS — Z12.31 SCREENING MAMMOGRAM FOR HIGH-RISK PATIENT: ICD-10-CM

## 2023-07-03 PROCEDURE — 77063 BREAST TOMOSYNTHESIS BI: CPT

## 2023-07-05 ENCOUNTER — OFFICE VISIT (OUTPATIENT)
Dept: ONCOLOGY | Age: 68
End: 2023-07-05
Payer: MEDICARE

## 2023-07-05 VITALS
TEMPERATURE: 98.9 F | WEIGHT: 195.4 LBS | DIASTOLIC BLOOD PRESSURE: 106 MMHG | HEIGHT: 63 IN | OXYGEN SATURATION: 98 % | BODY MASS INDEX: 34.62 KG/M2 | HEART RATE: 71 BPM | RESPIRATION RATE: 12 BRPM | SYSTOLIC BLOOD PRESSURE: 180 MMHG

## 2023-07-05 DIAGNOSIS — Z12.31 ENCOUNTER FOR SCREENING MAMMOGRAM FOR MALIGNANT NEOPLASM OF BREAST: ICD-10-CM

## 2023-07-05 DIAGNOSIS — Z85.3 HISTORY OF BREAST CANCER: Primary | ICD-10-CM

## 2023-07-05 PROCEDURE — 1123F ACP DISCUSS/DSCN MKR DOCD: CPT | Performed by: INTERNAL MEDICINE

## 2023-07-05 PROCEDURE — 3080F DIAST BP >= 90 MM HG: CPT | Performed by: INTERNAL MEDICINE

## 2023-07-05 PROCEDURE — G8399 PT W/DXA RESULTS DOCUMENT: HCPCS | Performed by: INTERNAL MEDICINE

## 2023-07-05 PROCEDURE — 3017F COLORECTAL CA SCREEN DOC REV: CPT | Performed by: INTERNAL MEDICINE

## 2023-07-05 PROCEDURE — 99214 OFFICE O/P EST MOD 30 MIN: CPT | Performed by: INTERNAL MEDICINE

## 2023-07-05 PROCEDURE — 1036F TOBACCO NON-USER: CPT | Performed by: INTERNAL MEDICINE

## 2023-07-05 PROCEDURE — 1090F PRES/ABSN URINE INCON ASSESS: CPT | Performed by: INTERNAL MEDICINE

## 2023-07-05 PROCEDURE — 3077F SYST BP >= 140 MM HG: CPT | Performed by: INTERNAL MEDICINE

## 2023-07-05 PROCEDURE — G8417 CALC BMI ABV UP PARAM F/U: HCPCS | Performed by: INTERNAL MEDICINE

## 2023-07-05 PROCEDURE — G8428 CUR MEDS NOT DOCUMENT: HCPCS | Performed by: INTERNAL MEDICINE

## 2023-07-05 ASSESSMENT — PATIENT HEALTH QUESTIONNAIRE - PHQ9
2. FEELING DOWN, DEPRESSED OR HOPELESS: 0
SUM OF ALL RESPONSES TO PHQ QUESTIONS 1-9: 0
SUM OF ALL RESPONSES TO PHQ9 QUESTIONS 1 & 2: 0
SUM OF ALL RESPONSES TO PHQ QUESTIONS 1-9: 0
1. LITTLE INTEREST OR PLEASURE IN DOING THINGS: 0
SUM OF ALL RESPONSES TO PHQ QUESTIONS 1-9: 0
SUM OF ALL RESPONSES TO PHQ QUESTIONS 1-9: 0

## 2023-07-05 NOTE — PATIENT INSTRUCTIONS
Patient Instructions from Today's Visit    Reason for Visit:  Follow up Breast Cancer     Diagnosis Information:  [email or web address]    Plan:  Mammogram reviewed   Call [phone] to schedule Mammogram    Follow Up: Follow up in 1 year    Recent Lab Results:  N/A    Treatment Summary has been discussed and given to patient:   N/A  -------------------------------------------------------------------------------------------------------------------  Please call our office at [phone] if you have any  of the following symptoms:   Fever of 100.5 or greater  Chills  Shortness of breath  Swelling or pain in one leg    After office hours an answering service is available and will contact a provider for emergencies or if you are experiencing any of the above symptoms. Patient does express an interest in My Chart. My Chart log in information explained on the after visit summary printout at the [address] [facility] desk.     Bernice Hilario RN

## 2023-07-05 NOTE — PROGRESS NOTES
[facility]: Office Visit Established Patient    Chief Complaint:    Chief Complaint   Patient presents with    Follow-up         History of Present Illness:  Ms. Clint Alvarado is a 78 y.o. female who returns today for management of breast cancer. She had a screening  mammogram in June 2016 that showed a developing group of calcifications in the upper outer right breast.  These were confirmed on additional views and she went for biopsy which showed DCIS. She was referred for surgical management and underwent lumpectomy  on 8/26/16 which surprisingly showed two foci of invasive ductal carcinoma in a background of DCIS. The larger of the two foci was 0.7 cm, and receptors showed 92% ER and 7% W[place], but also showed HER2 3+ positive. She went back for sentinel node biopsy  and reexcision, both of which were negative for residual carcinoma. She is referred to medical oncology for adjuvant recommendations. Her tumor was small, but HER2 positive, which suggests an increased  risk of recurrence, unless she receives adjuvant chemoimmunotherapy. Given the T1N0 disease, she would be an ideal candidate for the APT regimen with weekly paclitaxel + trastuzumab for 12 weeks, followed by Herceptin monotherapy to complete a year. She will complete radiation after chemotherapy and then start endocrine therapy with AI. She did not tolerate either Arimidex or Femara due to arthralgias, so she was changed to tamoxifen, which was  better tolerated, but she developed endometrial thickening requiring D&C. Ultimately, she decided to retry Arimidex once again. We stopped therapy in spring 2022 after completing (close to) 5 years of therapy. Ms. Clint Alvarado returns today for follow-up. She is doing well overall. She continues to have some fatigue and joint pain which she was hoping would have improved after stopping AI, but has been somewhat persistent.   She is following with primary care and they believe this

## 2023-12-29 DIAGNOSIS — R73.03 PREDIABETES: ICD-10-CM

## 2023-12-29 DIAGNOSIS — E78.2 MIXED HYPERLIPIDEMIA: ICD-10-CM

## 2023-12-29 DIAGNOSIS — I10 ESSENTIAL HYPERTENSION: Primary | ICD-10-CM

## 2023-12-29 DIAGNOSIS — E55.9 VITAMIN D DEFICIENCY: ICD-10-CM

## 2024-01-17 ENCOUNTER — NURSE ONLY (OUTPATIENT)
Dept: FAMILY MEDICINE CLINIC | Facility: CLINIC | Age: 69
End: 2024-01-17

## 2024-01-17 DIAGNOSIS — R73.03 PREDIABETES: ICD-10-CM

## 2024-01-17 DIAGNOSIS — E78.2 MIXED HYPERLIPIDEMIA: ICD-10-CM

## 2024-01-17 DIAGNOSIS — E55.9 VITAMIN D DEFICIENCY: ICD-10-CM

## 2024-01-17 DIAGNOSIS — I10 ESSENTIAL HYPERTENSION: ICD-10-CM

## 2024-01-17 LAB
BASOPHILS # BLD: 0 K/UL (ref 0–0.2)
BASOPHILS NFR BLD: 1 % (ref 0–2)
DIFFERENTIAL METHOD BLD: ABNORMAL
EOSINOPHIL # BLD: 0.1 K/UL (ref 0–0.8)
EOSINOPHIL NFR BLD: 2 % (ref 0.5–7.8)
ERYTHROCYTE [DISTWIDTH] IN BLOOD BY AUTOMATED COUNT: 13.8 % (ref 11.9–14.6)
HCT VFR BLD AUTO: 38.2 % (ref 35.8–46.3)
HGB BLD-MCNC: 12.1 G/DL (ref 11.7–15.4)
IMM GRANULOCYTES # BLD AUTO: 0 K/UL (ref 0–0.5)
IMM GRANULOCYTES NFR BLD AUTO: 0 % (ref 0–5)
LYMPHOCYTES # BLD: 2.2 K/UL (ref 0.5–4.6)
LYMPHOCYTES NFR BLD: 47 % (ref 13–44)
MCH RBC QN AUTO: 30 PG (ref 26.1–32.9)
MCHC RBC AUTO-ENTMCNC: 31.7 G/DL (ref 31.4–35)
MCV RBC AUTO: 94.6 FL (ref 82–102)
MONOCYTES # BLD: 0.6 K/UL (ref 0.1–1.3)
MONOCYTES NFR BLD: 13 % (ref 4–12)
NEUTS SEG # BLD: 1.7 K/UL (ref 1.7–8.2)
NEUTS SEG NFR BLD: 37 % (ref 43–78)
NRBC # BLD: 0 K/UL (ref 0–0.2)
PLATELET # BLD AUTO: 170 K/UL (ref 150–450)
PMV BLD AUTO: 10.2 FL (ref 9.4–12.3)
RBC # BLD AUTO: 4.04 M/UL (ref 4.05–5.2)
WBC # BLD AUTO: 4.5 K/UL (ref 4.3–11.1)

## 2024-01-18 LAB
25(OH)D3 SERPL-MCNC: 23.7 NG/ML (ref 30–100)
ALBUMIN SERPL-MCNC: 4 G/DL (ref 3.2–4.6)
ALBUMIN/GLOB SERPL: 1.1 (ref 0.4–1.6)
ALP SERPL-CCNC: 71 U/L (ref 50–136)
ALT SERPL-CCNC: 19 U/L (ref 12–65)
ANION GAP SERPL CALC-SCNC: 5 MMOL/L (ref 2–11)
AST SERPL-CCNC: 9 U/L (ref 15–37)
BILIRUB SERPL-MCNC: 0.4 MG/DL (ref 0.2–1.1)
BUN SERPL-MCNC: 20 MG/DL (ref 8–23)
CALCIUM SERPL-MCNC: 9.4 MG/DL (ref 8.3–10.4)
CHLORIDE SERPL-SCNC: 105 MMOL/L (ref 103–113)
CHOLEST SERPL-MCNC: 187 MG/DL
CO2 SERPL-SCNC: 27 MMOL/L (ref 21–32)
CREAT SERPL-MCNC: 1.1 MG/DL (ref 0.6–1)
EST. AVERAGE GLUCOSE BLD GHB EST-MCNC: 120 MG/DL
GLOBULIN SER CALC-MCNC: 3.8 G/DL (ref 2.8–4.5)
GLUCOSE SERPL-MCNC: 100 MG/DL (ref 65–100)
HBA1C MFR BLD: 5.8 % (ref 4.8–5.6)
HDLC SERPL-MCNC: 78 MG/DL (ref 40–60)
HDLC SERPL: 2.4
LDLC SERPL CALC-MCNC: 94.4 MG/DL
POTASSIUM SERPL-SCNC: 3.8 MMOL/L (ref 3.5–5.1)
PROT SERPL-MCNC: 7.8 G/DL (ref 6.3–8.2)
SODIUM SERPL-SCNC: 137 MMOL/L (ref 136–146)
TRIGL SERPL-MCNC: 73 MG/DL (ref 35–150)
VLDLC SERPL CALC-MCNC: 14.6 MG/DL (ref 6–23)

## 2024-01-24 ENCOUNTER — OFFICE VISIT (OUTPATIENT)
Dept: FAMILY MEDICINE CLINIC | Facility: CLINIC | Age: 69
End: 2024-01-24
Payer: MEDICARE

## 2024-01-24 VITALS
OXYGEN SATURATION: 97 % | HEART RATE: 84 BPM | HEIGHT: 63 IN | SYSTOLIC BLOOD PRESSURE: 130 MMHG | RESPIRATION RATE: 16 BRPM | DIASTOLIC BLOOD PRESSURE: 70 MMHG | BODY MASS INDEX: 34.05 KG/M2 | WEIGHT: 192.2 LBS

## 2024-01-24 DIAGNOSIS — H93.8X2 EAR FULLNESS, LEFT: ICD-10-CM

## 2024-01-24 DIAGNOSIS — F41.9 ANXIETY: ICD-10-CM

## 2024-01-24 DIAGNOSIS — G25.0 ESSENTIAL TREMOR: Primary | ICD-10-CM

## 2024-01-24 DIAGNOSIS — N17.9 AKI (ACUTE KIDNEY INJURY) (HCC): ICD-10-CM

## 2024-01-24 DIAGNOSIS — R73.03 PREDIABETES: ICD-10-CM

## 2024-01-24 DIAGNOSIS — E78.2 MIXED HYPERLIPIDEMIA: ICD-10-CM

## 2024-01-24 DIAGNOSIS — I10 ESSENTIAL HYPERTENSION: ICD-10-CM

## 2024-01-24 PROCEDURE — 3075F SYST BP GE 130 - 139MM HG: CPT | Performed by: FAMILY MEDICINE

## 2024-01-24 PROCEDURE — 1090F PRES/ABSN URINE INCON ASSESS: CPT | Performed by: FAMILY MEDICINE

## 2024-01-24 PROCEDURE — G8484 FLU IMMUNIZE NO ADMIN: HCPCS | Performed by: FAMILY MEDICINE

## 2024-01-24 PROCEDURE — 99214 OFFICE O/P EST MOD 30 MIN: CPT | Performed by: FAMILY MEDICINE

## 2024-01-24 PROCEDURE — 1123F ACP DISCUSS/DSCN MKR DOCD: CPT | Performed by: FAMILY MEDICINE

## 2024-01-24 PROCEDURE — 1036F TOBACCO NON-USER: CPT | Performed by: FAMILY MEDICINE

## 2024-01-24 PROCEDURE — G8399 PT W/DXA RESULTS DOCUMENT: HCPCS | Performed by: FAMILY MEDICINE

## 2024-01-24 PROCEDURE — 3078F DIAST BP <80 MM HG: CPT | Performed by: FAMILY MEDICINE

## 2024-01-24 PROCEDURE — 3017F COLORECTAL CA SCREEN DOC REV: CPT | Performed by: FAMILY MEDICINE

## 2024-01-24 PROCEDURE — G8427 DOCREV CUR MEDS BY ELIG CLIN: HCPCS | Performed by: FAMILY MEDICINE

## 2024-01-24 PROCEDURE — G8417 CALC BMI ABV UP PARAM F/U: HCPCS | Performed by: FAMILY MEDICINE

## 2024-01-24 RX ORDER — ESCITALOPRAM OXALATE 5 MG/1
5 TABLET ORAL DAILY
Qty: 90 TABLET | Refills: 1 | Status: SHIPPED | OUTPATIENT
Start: 2024-01-24

## 2024-01-24 RX ORDER — PRIMIDONE 50 MG/1
50 TABLET ORAL NIGHTLY
Qty: 90 TABLET | Refills: 0 | Status: SHIPPED | OUTPATIENT
Start: 2024-01-24

## 2024-01-24 RX ORDER — FLUTICASONE PROPIONATE 50 MCG
2 SPRAY, SUSPENSION (ML) NASAL DAILY
Qty: 1 EACH | Refills: 1 | Status: SHIPPED | OUTPATIENT
Start: 2024-01-24

## 2024-01-24 RX ORDER — TRIAMTERENE AND HYDROCHLOROTHIAZIDE 37.5; 25 MG/1; MG/1
1 TABLET ORAL DAILY
Qty: 90 TABLET | Refills: 1 | Status: SHIPPED | OUTPATIENT
Start: 2024-01-24

## 2024-01-24 RX ORDER — METOPROLOL SUCCINATE 50 MG/1
50 TABLET, EXTENDED RELEASE ORAL
Qty: 90 TABLET | Refills: 1 | Status: SHIPPED | OUTPATIENT
Start: 2024-01-24

## 2024-01-24 RX ORDER — ROSUVASTATIN CALCIUM 40 MG/1
40 TABLET, COATED ORAL DAILY
Qty: 90 TABLET | Refills: 1 | Status: SHIPPED | OUTPATIENT
Start: 2024-01-24

## 2024-01-24 ASSESSMENT — ENCOUNTER SYMPTOMS
BLOOD IN STOOL: 0
SHORTNESS OF BREATH: 0
ABDOMINAL PAIN: 0
NAUSEA: 0
VOMITING: 0
COUGH: 0

## 2024-01-24 ASSESSMENT — PATIENT HEALTH QUESTIONNAIRE - PHQ9
SUM OF ALL RESPONSES TO PHQ QUESTIONS 1-9: 0
1. LITTLE INTEREST OR PLEASURE IN DOING THINGS: 0
SUM OF ALL RESPONSES TO PHQ9 QUESTIONS 1 & 2: 0
SUM OF ALL RESPONSES TO PHQ QUESTIONS 1-9: 0
2. FEELING DOWN, DEPRESSED OR HOPELESS: 0

## 2024-01-24 NOTE — PROGRESS NOTES
[facility]  Emily Melendez Nick, DO  [address] [facility] [place] [address]  Ph No:  [phone]  Fax:  [phone]        Assessment/Plan:   Damaris was seen today for follow-up and ear fullness.    Diagnoses and all orders for this visit:    Essential tremor  Not controlled with beta-blocker alone.  Adding primidone.  Follow-up in 2 months to reevaluate.  -     primidone (MYSOLINE) 50 MG tablet; Take 1 tablet by mouth nightly For tremor    Ear fullness, left  No concerning findings on exam.  Trial of Flonase until she sees ENT.  Advised patient to direct the Flonase away from the nasal septum  -     fluticasone (FLONASE) 50 MCG/ACT nasal spray; 2 sprays by Each Nostril route daily  -     [facility]    Essential hypertension  Controlled.  Reviewed below labs with patient.  Continue triamterene/HCTZ and metoprolol.  -     triamterene-hydroCHLOROthiazide (MAXZIDE-25) 37.5-25 MG per tablet; Take 1 tablet by mouth daily For BP  -     metoprolol succinate (TOPROL XL) 50 MG extended release tablet; Take 1 tablet by mouth nightly    Prediabetes  Improved between appointments.  Congratulated patient.    Mixed hyperlipidemia  Stable.  Continue statin.  -     rosuvastatin (CRESTOR) 40 MG tablet; Take 1 tablet by mouth daily    Anxiety  Controlled.  Continue Lexapro.  -     escitalopram (LEXAPRO) 5 MG tablet; Take 1 tablet by mouth daily For anxiety    Acute kidney injury  Patient with normal GFR typically.  Slight decrease in renal function today.  She will return to clinic in 2-4 weeks to reevaluate.  With nonfasting BMP      Labs:  No results found for this visit on 01/24/24.    Nurse Only on 01/17/2024   Component Date Value Ref Range Status    Hemoglobin A1C 01/17/2024 5.8 (H)  4.8 - 5.6 % Final    Estimated Avg Glucose 01/17/2024 120  mg/dL Final    Comment: Reference Range  Normal: 4.8-5.6  Diabetic >=6.5%  Normal       <5.7%      Vit D, 25-Hydroxy 01/17/2024 23.7 (L)  30.0

## 2024-02-05 ENCOUNTER — NURSE ONLY (OUTPATIENT)
Dept: FAMILY MEDICINE CLINIC | Facility: CLINIC | Age: 69
End: 2024-02-05

## 2024-02-05 DIAGNOSIS — N17.9 AKI (ACUTE KIDNEY INJURY) (HCC): ICD-10-CM

## 2024-02-05 LAB
ANION GAP SERPL CALC-SCNC: 3 MMOL/L (ref 2–11)
BUN SERPL-MCNC: 18 MG/DL (ref 8–23)
CALCIUM SERPL-MCNC: 9.4 MG/DL (ref 8.3–10.4)
CHLORIDE SERPL-SCNC: 108 MMOL/L (ref 103–113)
CO2 SERPL-SCNC: 30 MMOL/L (ref 21–32)
CREAT SERPL-MCNC: 0.8 MG/DL (ref 0.6–1)
CREAT UR-MCNC: 127 MG/DL
GLUCOSE SERPL-MCNC: 100 MG/DL (ref 65–100)
MICROALBUMIN UR-MCNC: 19.9 MG/DL
MICROALBUMIN/CREAT UR-RTO: 157 MG/G (ref 0–30)
POTASSIUM SERPL-SCNC: 3.7 MMOL/L (ref 3.5–5.1)
SODIUM SERPL-SCNC: 141 MMOL/L (ref 136–146)

## 2024-02-23 ENCOUNTER — OFFICE VISIT (OUTPATIENT)
Dept: FAMILY MEDICINE CLINIC | Facility: CLINIC | Age: 69
End: 2024-02-23

## 2024-02-23 VITALS
HEIGHT: 63 IN | RESPIRATION RATE: 16 BRPM | DIASTOLIC BLOOD PRESSURE: 74 MMHG | BODY MASS INDEX: 33.98 KG/M2 | WEIGHT: 191.8 LBS | HEART RATE: 55 BPM | OXYGEN SATURATION: 98 % | SYSTOLIC BLOOD PRESSURE: 126 MMHG

## 2024-02-23 DIAGNOSIS — M54.12 CERVICAL RADICULOPATHY: ICD-10-CM

## 2024-02-23 DIAGNOSIS — M75.02 ADHESIVE CAPSULITIS OF LEFT SHOULDER: Primary | ICD-10-CM

## 2024-02-23 DIAGNOSIS — G25.0 ESSENTIAL TREMOR: ICD-10-CM

## 2024-02-23 RX ORDER — CELECOXIB 200 MG/1
200 CAPSULE ORAL 2 TIMES DAILY PRN
Qty: 60 CAPSULE | Refills: 0 | Status: SHIPPED | OUTPATIENT
Start: 2024-02-23 | End: 2024-03-24

## 2024-02-23 RX ORDER — TIZANIDINE 4 MG/1
4 TABLET ORAL NIGHTLY PRN
Qty: 30 TABLET | Refills: 0 | Status: SHIPPED | OUTPATIENT
Start: 2024-02-23

## 2024-02-23 ASSESSMENT — ENCOUNTER SYMPTOMS: BACK PAIN: 1

## 2024-02-23 NOTE — PROGRESS NOTES
[facility]  Emily Melendez Nick, DO  [address] [facility] [place] [address]   No:  [phone]  Fax:  [phone]        Assessment/Plan:   Damaris was seen today for shoulder pain.    Diagnoses and all orders for this visit:    Adhesive capsulitis of left shoulder  New problem.  Referring her to physical therapy.  Prescribing Celebrex and tizanidine.  Advised her to avoid over-the-counter NSAIDs taking Celebrex.  Advised patient that the muscle laxer may make her drowsy.  Keep follow-up scheduled for early April.  If no improvement may need MRI of the shoulder and the neck.  -     celecoxib (CELEBREX) 200 MG capsule; Take 1 capsule by mouth 2 times daily as needed for Pain (Take with food)  -     tiZANidine (ZANAFLEX) 4 MG tablet; Take 1 tablet by mouth nightly as needed (muscle pain)  -     External Referral to Physical Therapy    Cervical radiculopathy  She is having some radicular symptoms involving the second, third and fourth digits.  Await response to physical therapy, NSAIDs and muscle relaxant.  If no improvement by April 1 would recommend imaging such as MRI of the neck.  Patient is agreeable to trial physical therapy.  -     celecoxib (CELEBREX) 200 MG capsule; Take 1 capsule by mouth 2 times daily as needed for Pain (Take with food)  -     tiZANidine (ZANAFLEX) 4 MG tablet; Take 1 tablet by mouth nightly as needed (muscle pain)  -     External Referral to Physical Therapy    Essential tremor  She has been taking the mesalamine for about 2 weeks with no improvement.  Encourage patient to continue for now and assess response at her scheduled follow-up in early April.                Damaris Chen is a 68 y.o. female who is seen for evaluation of   Chief Complaint   Patient presents with    Shoulder Pain     Left shoulder, arm, neck, back pain  Onset one week.  Pt denies injury.         HPI:   She is right-handed.  For the last week she has had left-sided neck, left upper

## 2024-04-01 ENCOUNTER — OFFICE VISIT (OUTPATIENT)
Dept: FAMILY MEDICINE CLINIC | Facility: CLINIC | Age: 69
End: 2024-04-01
Payer: MEDICARE

## 2024-04-01 VITALS
DIASTOLIC BLOOD PRESSURE: 74 MMHG | HEART RATE: 81 BPM | WEIGHT: 190.6 LBS | RESPIRATION RATE: 16 BRPM | HEIGHT: 63 IN | BODY MASS INDEX: 33.77 KG/M2 | SYSTOLIC BLOOD PRESSURE: 126 MMHG | OXYGEN SATURATION: 95 %

## 2024-04-01 DIAGNOSIS — I47.10 SVT (SUPRAVENTRICULAR TACHYCARDIA) (HCC): ICD-10-CM

## 2024-04-01 DIAGNOSIS — R53.83 OTHER FATIGUE: ICD-10-CM

## 2024-04-01 DIAGNOSIS — F51.5 NIGHTMARE: ICD-10-CM

## 2024-04-01 DIAGNOSIS — G25.0 ESSENTIAL TREMOR: Primary | ICD-10-CM

## 2024-04-01 DIAGNOSIS — I49.9 IRREGULAR HEART RHYTHM: ICD-10-CM

## 2024-04-01 PROCEDURE — G8399 PT W/DXA RESULTS DOCUMENT: HCPCS | Performed by: FAMILY MEDICINE

## 2024-04-01 PROCEDURE — 1090F PRES/ABSN URINE INCON ASSESS: CPT | Performed by: FAMILY MEDICINE

## 2024-04-01 PROCEDURE — 3074F SYST BP LT 130 MM HG: CPT | Performed by: FAMILY MEDICINE

## 2024-04-01 PROCEDURE — 3017F COLORECTAL CA SCREEN DOC REV: CPT | Performed by: FAMILY MEDICINE

## 2024-04-01 PROCEDURE — 1123F ACP DISCUSS/DSCN MKR DOCD: CPT | Performed by: FAMILY MEDICINE

## 2024-04-01 PROCEDURE — 1036F TOBACCO NON-USER: CPT | Performed by: FAMILY MEDICINE

## 2024-04-01 PROCEDURE — G8417 CALC BMI ABV UP PARAM F/U: HCPCS | Performed by: FAMILY MEDICINE

## 2024-04-01 PROCEDURE — G8427 DOCREV CUR MEDS BY ELIG CLIN: HCPCS | Performed by: FAMILY MEDICINE

## 2024-04-01 PROCEDURE — 3078F DIAST BP <80 MM HG: CPT | Performed by: FAMILY MEDICINE

## 2024-04-01 ASSESSMENT — ENCOUNTER SYMPTOMS
COUGH: 0
ABDOMINAL PAIN: 0
SHORTNESS OF BREATH: 1
BLOOD IN STOOL: 0
VOMITING: 0

## 2024-04-01 NOTE — PROGRESS NOTES
DILATION AND CURETTAGE OF UTERUS  05/2019    HARVEY BIOPSY BREAST STEREOTACTIC Right 7.19.16    ORTHOPEDIC SURGERY      left wrist ORIF    TUBAL LIGATION      VASCULAR SURGERY      port has been removed        Current Outpatient Medications   Medication Sig Dispense Refill    celecoxib (CELEBREX) 200 MG capsule Take 1 capsule by mouth 2 times daily as needed for Pain (Take with food) 60 capsule 0    tiZANidine (ZANAFLEX) 4 MG tablet Take 1 tablet by mouth nightly as needed (muscle pain) 30 tablet 0    fluticasone (FLONASE) 50 MCG/ACT nasal spray 2 sprays by Each Nostril route daily 1 each 1    triamterene-hydroCHLOROthiazide (MAXZIDE-25) 37.5-25 MG per tablet Take 1 tablet by mouth daily For BP 90 tablet 1    rosuvastatin (CRESTOR) 40 MG tablet Take 1 tablet by mouth daily 90 tablet 1    metoprolol succinate (TOPROL XL) 50 MG extended release tablet Take 1 tablet by mouth nightly 90 tablet 1    escitalopram (LEXAPRO) 5 MG tablet Take 1 tablet by mouth daily For anxiety 90 tablet 1    vitamin D (CHOLECALCIFEROL) 25 MCG (1000 UT) TABS tablet Take 1 tablet by mouth in the morning and at bedtime      acetaminophen (TYLENOL) 650 MG extended release tablet Take 2 tablets by mouth every 8 hours as needed      diphenhydrAMINE (BENADRYL) 25 MG capsule Take 1 capsule by mouth every 6 hours as needed       No current facility-administered medications for this visit.       Immunization History   Administered Date(s) Administered    COVID-19, MODERNA BLUE border, Primary or Immunocompromised, (age 12y+), IM, 100 mcg/0.5mL 03/16/2021, 04/13/2021, 12/21/2021    COVID-19, MODERNA Bivalent, (age 12y+), IM, 50 mcg/0.5 mL 11/19/2022    Influenza Virus Vaccine 11/15/2016, 10/01/2018, 10/03/2019    Influenza, FLUAD, (age 65 y+), Adjuvanted, 0.5mL 11/08/2022    Influenza, FLUARIX, FLULAVAL, FLUZONE (age 6 mo+) AND AFLURIA, (age 3 y+), PF, 0.5mL 11/15/2016, 10/01/2018, 10/03/2019    Pneumococcal, PCV20, PREVNAR 20, (age 6w+), IM, 0.5mL

## 2024-04-22 NOTE — PROGRESS NOTES
[facility]  [address], [address]  [place] [address]  Phone: [phone] Fax [phone]  Brett Romero MD      Patient: Damaris Chen  Provider: Brett Romero MD    CC: No chief complaint on file.    Referring Provider:    History of Present Illness:     Damaris Chen is a 68 y.o. RH female who presents for follow up of tremor.     She is unaccompanied for today's visit.    Patient presents for further evaluation of a tremor of the hands.  She notices tremor mostly in the dominant right hand.  It is best described as a postural/action tremor that can interfere with tasks requiring dexterity.  She notes that her handwriting has become less legible. She may have difficulty with other task such as keyboarding or holding a drink steady.  She has very little tremor in her nondominant left hand. She denies any tremor of the head or voice.      She has no known family history of tremor in her parents and she has no siblings.  But she has noticed that her children may have some mild tremors, though it does not appear that any formal diagnosis has been made.      She had a trial of primidone for benign essential tremor which did not provide much benefit and ultimately was poorly tolerated due to an increase in nightmares/vivid dreams that was disrupting her sleep.  This medication has been discontinued.  She has not had any other medication trials.    She does have a history of SVT and hypertension for which she is on metoprolol.  She has a history of generalized anxiety on Lexapro. She has had a previous issue with capsulitis of the left shoulder and has a known history of cervical radiculopathy.     She denies any significant gait or balance impairment.  No speech or swallowing changes.  No bowel or bladder disturbances.      Review of Systems:   Review of Systems   Constitutional:  Negative for fever.   HENT:  Negative for voice change.    Eyes:  Negative for visual disturbance.

## 2024-04-23 ENCOUNTER — OFFICE VISIT (OUTPATIENT)
Dept: NEUROLOGY | Age: 69
End: 2024-04-23
Payer: MEDICARE

## 2024-04-23 VITALS
WEIGHT: 192 LBS | BODY MASS INDEX: 34.02 KG/M2 | HEART RATE: 88 BPM | DIASTOLIC BLOOD PRESSURE: 94 MMHG | HEIGHT: 63 IN | SYSTOLIC BLOOD PRESSURE: 184 MMHG

## 2024-04-23 DIAGNOSIS — G25.0 ESSENTIAL TREMOR: Primary | ICD-10-CM

## 2024-04-23 PROCEDURE — 99203 OFFICE O/P NEW LOW 30 MIN: CPT | Performed by: PSYCHIATRY & NEUROLOGY

## 2024-04-23 PROCEDURE — 1090F PRES/ABSN URINE INCON ASSESS: CPT | Performed by: PSYCHIATRY & NEUROLOGY

## 2024-04-23 PROCEDURE — G8427 DOCREV CUR MEDS BY ELIG CLIN: HCPCS | Performed by: PSYCHIATRY & NEUROLOGY

## 2024-04-23 PROCEDURE — 3080F DIAST BP >= 90 MM HG: CPT | Performed by: PSYCHIATRY & NEUROLOGY

## 2024-04-23 PROCEDURE — G8399 PT W/DXA RESULTS DOCUMENT: HCPCS | Performed by: PSYCHIATRY & NEUROLOGY

## 2024-04-23 PROCEDURE — 3077F SYST BP >= 140 MM HG: CPT | Performed by: PSYCHIATRY & NEUROLOGY

## 2024-04-23 PROCEDURE — 3017F COLORECTAL CA SCREEN DOC REV: CPT | Performed by: PSYCHIATRY & NEUROLOGY

## 2024-04-23 PROCEDURE — 1036F TOBACCO NON-USER: CPT | Performed by: PSYCHIATRY & NEUROLOGY

## 2024-04-23 PROCEDURE — 1123F ACP DISCUSS/DSCN MKR DOCD: CPT | Performed by: PSYCHIATRY & NEUROLOGY

## 2024-04-23 PROCEDURE — G8417 CALC BMI ABV UP PARAM F/U: HCPCS | Performed by: PSYCHIATRY & NEUROLOGY

## 2024-04-23 RX ORDER — TOPIRAMATE 25 MG/1
25 TABLET ORAL 2 TIMES DAILY
Qty: 60 TABLET | Refills: 5 | Status: SHIPPED | OUTPATIENT
Start: 2024-04-23

## 2024-04-23 ASSESSMENT — ENCOUNTER SYMPTOMS
COUGH: 0
ABDOMINAL PAIN: 0
VOICE CHANGE: 0

## 2024-04-23 NOTE — PATIENT INSTRUCTIONS
Start topiramate 25 mg tablet.   Week 1: Start with 1 tablet at night.   Week 2: Take 1 tablet in the morning and 1 tablet at night.     Other options to consider would be switching to metoprolol to a different beta blocker call ed propranolol. I would like to make sure that your cardiologist would be ok with making that change before we do it.

## 2024-05-07 NOTE — PROGRESS NOTES
[facility] CARDIOLOG[facility]  [address] [place] [address], [address]  [place] [address]  PHONE: [phone]      24    NAME:  Damaris Chen  : 1955  MRN: [id number]         SUBJECTIVE:   Damaris Chen is a 68 y.o. female seen for a follow up visit regarding the following:     Chief Complaint   Patient presents with    Consultation    Irregular Heart rhythm            HPI:  Follow up  Consultation and Irregular Heart rhythm   .    Patient lacks just a couple of weeks to make 3 years since last visit. Follow up prior chemo (biologic, no anthracyclines) and radiation therapy of breast cancer. History of PSVT. Presented recently to her PCP with progressive fatigue, palpitations.     Patient states her PCP had noticed an \"extra heart beat\" and just advised a check up.  She says she has felt pretty well.  Admits that since her childhood she used to worry about losing her hair, which happened with chemo, and losing her teeth.  She is about to get extensive extraction and possible implants.  She is concerned about reviewing her meds to make sure she's okay to have it done.      Chronic fatigue and she does endorse palpitations and dyspnea.  She attributes to her weight gain, estimating 10+ in the last year.  She denies chest discomfort or pressure.  Feet swell, goes down overnight and is on her feet most of the day running errands for her family.  Stressful times at home, 's been ill.  She's not been particularly attentive to her diet, has recently tried to reduce the sodium which she admits is higher than it should be.  Palpitations feel like a flip followed by a racing, onset is sudden, offset is gradual.  She has difficulty saying how often it might occur, she estimates maybe twice a week.      She consumes less caffeine than she did, now just one cup of coffee in the morning, drinking more water.     BP on home diary has been 120's/70's.  She has known sleep apnea, AHI 8.9, low SaO2 82%,

## 2024-05-08 ENCOUNTER — INITIAL CONSULT (OUTPATIENT)
Age: 69
End: 2024-05-08
Payer: MEDICARE

## 2024-05-08 ENCOUNTER — TELEPHONE (OUTPATIENT)
Dept: SLEEP MEDICINE | Age: 69
End: 2024-05-08

## 2024-05-08 VITALS
SYSTOLIC BLOOD PRESSURE: 148 MMHG | BODY MASS INDEX: 34.02 KG/M2 | DIASTOLIC BLOOD PRESSURE: 100 MMHG | HEIGHT: 63 IN | HEART RATE: 64 BPM | WEIGHT: 192 LBS

## 2024-05-08 DIAGNOSIS — I10 ESSENTIAL HYPERTENSION: Primary | ICD-10-CM

## 2024-05-08 DIAGNOSIS — R06.02 SHORTNESS OF BREATH: ICD-10-CM

## 2024-05-08 DIAGNOSIS — R00.2 PALPITATIONS: ICD-10-CM

## 2024-05-08 DIAGNOSIS — I47.10 SVT (SUPRAVENTRICULAR TACHYCARDIA) (HCC): ICD-10-CM

## 2024-05-08 PROCEDURE — 1036F TOBACCO NON-USER: CPT | Performed by: INTERNAL MEDICINE

## 2024-05-08 PROCEDURE — 1123F ACP DISCUSS/DSCN MKR DOCD: CPT | Performed by: INTERNAL MEDICINE

## 2024-05-08 PROCEDURE — 1090F PRES/ABSN URINE INCON ASSESS: CPT | Performed by: INTERNAL MEDICINE

## 2024-05-08 PROCEDURE — 3080F DIAST BP >= 90 MM HG: CPT | Performed by: INTERNAL MEDICINE

## 2024-05-08 PROCEDURE — 99214 OFFICE O/P EST MOD 30 MIN: CPT | Performed by: INTERNAL MEDICINE

## 2024-05-08 PROCEDURE — G8399 PT W/DXA RESULTS DOCUMENT: HCPCS | Performed by: INTERNAL MEDICINE

## 2024-05-08 PROCEDURE — 3077F SYST BP >= 140 MM HG: CPT | Performed by: INTERNAL MEDICINE

## 2024-05-08 PROCEDURE — G8427 DOCREV CUR MEDS BY ELIG CLIN: HCPCS | Performed by: INTERNAL MEDICINE

## 2024-05-08 PROCEDURE — G8417 CALC BMI ABV UP PARAM F/U: HCPCS | Performed by: INTERNAL MEDICINE

## 2024-05-08 PROCEDURE — 3017F COLORECTAL CA SCREEN DOC REV: CPT | Performed by: INTERNAL MEDICINE

## 2024-05-08 ASSESSMENT — ENCOUNTER SYMPTOMS
SHORTNESS OF BREATH: 1
BACK PAIN: 1

## 2024-05-08 NOTE — TELEPHONE ENCOUNTER
Cardiologist called Dr. Garnica and it was then brought to my attention that patient did not follow up after sleep study last year. Message was routed on 2/13/23 to scheduling pool to schedule New PT PSG appointment. AHI 8.9, SaO2 82%.     I called to schedule and left a voice mail. I then checked lab retriever and called the other number , which is her spouse. He said he will tell her to call to schedule.

## 2024-05-09 ENCOUNTER — OFFICE VISIT (OUTPATIENT)
Dept: SLEEP MEDICINE | Age: 69
End: 2024-05-09
Payer: MEDICARE

## 2024-05-09 VITALS
RESPIRATION RATE: 14 BRPM | DIASTOLIC BLOOD PRESSURE: 78 MMHG | HEART RATE: 76 BPM | TEMPERATURE: 97 F | WEIGHT: 191 LBS | OXYGEN SATURATION: 99 % | BODY MASS INDEX: 33.84 KG/M2 | SYSTOLIC BLOOD PRESSURE: 128 MMHG | HEIGHT: 63 IN

## 2024-05-09 DIAGNOSIS — G47.33 OBSTRUCTIVE SLEEP APNEA SYNDROME: Primary | ICD-10-CM

## 2024-05-09 DIAGNOSIS — R40.0 DAYTIME SLEEPINESS: ICD-10-CM

## 2024-05-09 DIAGNOSIS — E66.01 SEVERE OBESITY (HCC): ICD-10-CM

## 2024-05-09 DIAGNOSIS — G47.34 NOCTURNAL HYPOXEMIA: ICD-10-CM

## 2024-05-09 PROCEDURE — G8427 DOCREV CUR MEDS BY ELIG CLIN: HCPCS | Performed by: INTERNAL MEDICINE

## 2024-05-09 PROCEDURE — 3078F DIAST BP <80 MM HG: CPT | Performed by: INTERNAL MEDICINE

## 2024-05-09 PROCEDURE — 1090F PRES/ABSN URINE INCON ASSESS: CPT | Performed by: INTERNAL MEDICINE

## 2024-05-09 PROCEDURE — 3074F SYST BP LT 130 MM HG: CPT | Performed by: INTERNAL MEDICINE

## 2024-05-09 PROCEDURE — 3017F COLORECTAL CA SCREEN DOC REV: CPT | Performed by: INTERNAL MEDICINE

## 2024-05-09 PROCEDURE — G8417 CALC BMI ABV UP PARAM F/U: HCPCS | Performed by: INTERNAL MEDICINE

## 2024-05-09 PROCEDURE — 1036F TOBACCO NON-USER: CPT | Performed by: INTERNAL MEDICINE

## 2024-05-09 PROCEDURE — 99204 OFFICE O/P NEW MOD 45 MIN: CPT | Performed by: INTERNAL MEDICINE

## 2024-05-09 PROCEDURE — G8399 PT W/DXA RESULTS DOCUMENT: HCPCS | Performed by: INTERNAL MEDICINE

## 2024-05-09 PROCEDURE — 1123F ACP DISCUSS/DSCN MKR DOCD: CPT | Performed by: INTERNAL MEDICINE

## 2024-05-09 NOTE — PROGRESS NOTES
cyanosis, jaundice, or ecchymosis present.   EXTREMITIES:   The extremities are unremarkable without clubbing, cyanosis, joint inflammation, degenerative, or ischemic change.   MUSCULOSKELETAL:   There is no abnormal tone, muscle atrophy, or abnormal movement present.   NEURO:   The patient is alert and oriented to person, place, and time.  Memory appears intact and mood is normal.  No gross sensorimotor deficits are present.      DIAGNOSTIC TESTS:  Split Study 1/30/23           ASSESSMENT:  (Medical Decision Making)         ICD-10-CM    1. Obstructive sleep apnea syndrome, mild and need further treatment    The pathophysiology of obstructive sleep apnea was reviewed with the patient.  It's potential to promote severe neurologic, cardiac, pulmonary, and gastrointestinal problems was discussed. Specifically, the increased incidence of hypertension, coronary artery disease, congestive heart failure, pulmonary hypertension, gastroesophageal reflux, pathologic hypersomnolence, memory loss, and glucose intolerance was related to the consequences of hypoxemia, hypercapnia, airway obstruction, and sympathetic overdrive.  We also discussed the ability of nasal CPAP to correct these abnormalities through maintenance of a patent airway.  Therapeutic options including surgery, oral appliances, and weight loss were also reviewed.   G47.33 DME - DURABLE MEDICAL EQUIPMENT      2. Nocturnal hypoxemia , related to sleep apnea improved with CPAP therapy G47.34 DME - DURABLE MEDICAL EQUIPMENT      3. Daytime sleepiness , related to untreated sleep apnea R40.0 DME - DURABLE MEDICAL EQUIPMENT      4. Severe obesity (HCC) , contributing to complexity of care E66.01              PLAN:    Patient placed on auto CPAP at 8-10 cm with EPR 3    Ramp time will be 30 minutes starting at 5 cm pressure    Proper sleep hygiene and positional therapy are recommended    Appropriate handout regarding sleep hygiene and sleep education and treatment

## 2024-05-09 NOTE — PATIENT INSTRUCTIONS
failure.  How does it help?  CPAP can help you have more normal sleep, so you feel less sleepy and more alert during the daytime.  CPAP may help keep heart failure or other heart problems from getting worse.  CPAP may help lower your blood pressure.  If you use CPAP, your bed partner may also sleep better because you are not snoring or restless.  What are the side effects?  Some people who use CPAP have:  A dry or stuffy nose and a sore throat.  Irritated skin on the face.  Sore eyes.  Bloating.  If you have any of these problems, work with your doctor to fix them. Here are some things you can try:  Be sure the mask or nasal prongs fit well.  See if your doctor can adjust the pressure of your CPAP.  If your nose is dry, try a humidifier.  If your nose is runny or stuffy, try decongestant medicine or a steroid nasal spray. Be safe with medicines. Read and follow all instructions on the label. Do not use the medicine longer than the label says.  If these things do not help, you might try a different type of machine. Some machines have air pressure that adjusts on its own. Others have air pressures that are different when you breathe in than when you breathe out. This may reduce discomfort caused by too much pressure in your nose.   Where can you learn more?   Go to http://www.[employer].net/[facility]  Enter X266 in the search box to learn more about \"Learning About CPAP for Sleep Apnea.\"   © 1473-3398 [employer]. Care instructions adapted under license by [employer] (which disclaims liability or warranty for this information). This care instruction is for use with your licensed healthcare professional. If you have questions about a medical condition or this instruction, always ask your healthcare professional. [employer] disclaims any warranty or liability for your use of this information.  Content Version: 10.1.729567; Current as of: January 24, 2014

## 2024-05-29 ENCOUNTER — TELEPHONE (OUTPATIENT)
Dept: SLEEP MEDICINE | Age: 69
End: 2024-05-29

## 2024-05-29 NOTE — TELEPHONE ENCOUNTER
We put in an order for a new set up for this pt. Her most recent sleep study was in January 2023, since she is Medicare she will need a new sleep study.

## 2024-06-24 ENCOUNTER — TELEPHONE (OUTPATIENT)
Dept: ONCOLOGY | Age: 69
End: 2024-06-24

## 2024-06-24 NOTE — TELEPHONE ENCOUNTER
Pt called stating that she wants to r/s her appt to be after her leann. She is awaiting a call back.

## 2024-06-24 NOTE — TELEPHONE ENCOUNTER
Reached out to patient regarding her request to move appt until after her mammogram.. Unable to leave vm, not set up yet.. Will try again later.

## 2024-06-25 NOTE — TELEPHONE ENCOUNTER
Reached out again to patient regarding request to get appt r/s.. unable to leave voicemail.. box is full.. will try again.

## 2024-06-26 NOTE — PROGRESS NOTES
Cardiovascular:  Negative for chest pain.          PHYSICAL EXAM:   BP (!) 140/90   Pulse 67   Ht 1.6 m (5' 3\")   Wt 87.5 kg (192 lb 12.8 oz)   BMI 34.15 kg/m²      Wt Readings from Last 3 Encounters:   06/27/24 87.5 kg (192 lb 12.8 oz)   06/18/24 86.6 kg (191 lb)   05/21/24 86.6 kg (191 lb)     BP Readings from Last 3 Encounters:   06/27/24 (!) 140/90   06/18/24 124/80   05/21/24 120/80     Pulse Readings from Last 3 Encounters:   06/27/24 67   06/18/24 69   05/21/24 60           Physical Exam  Constitutional:       Appearance: Normal appearance.   HENT:      Head: Normocephalic and atraumatic.   Eyes:      Conjunctiva/sclera: Conjunctivae normal.   Neck:      Vascular: No carotid bruit.   Cardiovascular:      Rate and Rhythm: Normal rate and regular rhythm.      Heart sounds: No murmur heard.     No friction rub. No gallop.   Pulmonary:      Effort: No respiratory distress.      Breath sounds: No wheezing or rales.   Musculoskeletal:         General: No swelling.      Cervical back: Neck supple.   Skin:     General: Skin is warm and dry.   Neurological:      General: No focal deficit present.      Mental Status: She is alert.   Psychiatric:         Mood and Affect: Mood normal.         Behavior: Behavior normal.         Medical problems and test results were reviewed with the patient today.     DATA REVIEW    LIPID PANEL     Lab Results   Component Value Date    CHOL 187 01/17/2024    CHOL 213 (H) 06/15/2023    CHOL 210 (H) 11/04/2022     Lab Results   Component Value Date    TRIG 73 01/17/2024    TRIG 60 06/15/2023    TRIG 64 11/04/2022     Lab Results   Component Value Date    HDL 78 (H) 01/17/2024    HDL 75 (H) 06/15/2023    HDL 71 (H) 11/04/2022     No components found for: \"LDLCHOLESTEROL\", \"LDLCALC\"  Lab Results   Component Value Date    VLDL 14.6 01/17/2024    VLDL 12 06/15/2023    VLDL 12.8 11/04/2022     Lab Results   Component Value Date    CHOLHDLRATIO 2.4 01/17/2024    CHOLHDLRATIO 2.8 06/15/2023

## 2024-06-27 ENCOUNTER — OFFICE VISIT (OUTPATIENT)
Age: 69
End: 2024-06-27
Payer: MEDICARE

## 2024-06-27 VITALS
BODY MASS INDEX: 34.16 KG/M2 | SYSTOLIC BLOOD PRESSURE: 140 MMHG | WEIGHT: 192.8 LBS | DIASTOLIC BLOOD PRESSURE: 90 MMHG | HEART RATE: 67 BPM | HEIGHT: 63 IN

## 2024-06-27 DIAGNOSIS — I10 ESSENTIAL HYPERTENSION: Primary | ICD-10-CM

## 2024-06-27 DIAGNOSIS — G47.33 OBSTRUCTIVE SLEEP APNEA SYNDROME: ICD-10-CM

## 2024-06-27 DIAGNOSIS — I47.10 SVT (SUPRAVENTRICULAR TACHYCARDIA) (HCC): ICD-10-CM

## 2024-06-27 PROCEDURE — 1036F TOBACCO NON-USER: CPT | Performed by: INTERNAL MEDICINE

## 2024-06-27 PROCEDURE — G8417 CALC BMI ABV UP PARAM F/U: HCPCS | Performed by: INTERNAL MEDICINE

## 2024-06-27 PROCEDURE — 3080F DIAST BP >= 90 MM HG: CPT | Performed by: INTERNAL MEDICINE

## 2024-06-27 PROCEDURE — G8399 PT W/DXA RESULTS DOCUMENT: HCPCS | Performed by: INTERNAL MEDICINE

## 2024-06-27 PROCEDURE — 1123F ACP DISCUSS/DSCN MKR DOCD: CPT | Performed by: INTERNAL MEDICINE

## 2024-06-27 PROCEDURE — G8427 DOCREV CUR MEDS BY ELIG CLIN: HCPCS | Performed by: INTERNAL MEDICINE

## 2024-06-27 PROCEDURE — 99214 OFFICE O/P EST MOD 30 MIN: CPT | Performed by: INTERNAL MEDICINE

## 2024-06-27 PROCEDURE — 1090F PRES/ABSN URINE INCON ASSESS: CPT | Performed by: INTERNAL MEDICINE

## 2024-06-27 PROCEDURE — 3017F COLORECTAL CA SCREEN DOC REV: CPT | Performed by: INTERNAL MEDICINE

## 2024-06-27 PROCEDURE — 3077F SYST BP >= 140 MM HG: CPT | Performed by: INTERNAL MEDICINE

## 2024-06-27 NOTE — PATIENT INSTRUCTIONS
CHANGE AT ANY AGE CAN POTENTIALLY INCREASE YOUR LIFE SPAN:    A sustained change from a typical Western to a predominantly plant based diet     At age 20 --Women may added on average 10.7 years, Men 13 years                         At age 60 --Women >= 8 years, Men >= 8.8 years                         At age 80--Women and Men > 3.4 years    The largest gains are seen from eating:    More legumes, whole grains, and nuts    Less red meat and processed meats    Google \"Food As Medicine Jumpstart\" for a free online pamphlet from the American College of Lifestyle Medicine:  https://lifestylemedicine.org/wp-content/uploads/2024/01/ACLM-Food-As-Medicine-Jumpstart-8.5x11.pdf

## 2024-07-22 ENCOUNTER — LAB (OUTPATIENT)
Dept: FAMILY MEDICINE CLINIC | Facility: CLINIC | Age: 69
End: 2024-07-22

## 2024-07-22 DIAGNOSIS — R73.03 PREDIABETES: ICD-10-CM

## 2024-07-22 DIAGNOSIS — I10 ESSENTIAL HYPERTENSION: ICD-10-CM

## 2024-07-22 DIAGNOSIS — E78.2 MIXED HYPERLIPIDEMIA: ICD-10-CM

## 2024-07-22 LAB
ALBUMIN SERPL-MCNC: 4.1 G/DL (ref 3.2–4.6)
ALBUMIN/GLOB SERPL: 1.1 (ref 1–1.9)
ALP SERPL-CCNC: 70 U/L (ref 35–104)
ALT SERPL-CCNC: 12 U/L (ref 12–65)
ANION GAP SERPL CALC-SCNC: 11 MMOL/L (ref 9–18)
AST SERPL-CCNC: 18 U/L (ref 15–37)
BASOPHILS # BLD: 0 K/UL (ref 0–0.2)
BASOPHILS NFR BLD: 1 % (ref 0–2)
BILIRUB SERPL-MCNC: 0.4 MG/DL (ref 0–1.2)
BUN SERPL-MCNC: 19 MG/DL (ref 8–23)
CALCIUM SERPL-MCNC: 9.6 MG/DL (ref 8.8–10.2)
CHLORIDE SERPL-SCNC: 103 MMOL/L (ref 98–107)
CHOLEST SERPL-MCNC: 201 MG/DL (ref 0–200)
CO2 SERPL-SCNC: 26 MMOL/L (ref 20–28)
CREAT SERPL-MCNC: 1 MG/DL (ref 0.6–1.1)
DIFFERENTIAL METHOD BLD: ABNORMAL
EOSINOPHIL # BLD: 0.1 K/UL (ref 0–0.8)
EOSINOPHIL NFR BLD: 3 % (ref 0.5–7.8)
ERYTHROCYTE [DISTWIDTH] IN BLOOD BY AUTOMATED COUNT: 13.9 % (ref 11.9–14.6)
EST. AVERAGE GLUCOSE BLD GHB EST-MCNC: 131 MG/DL
GLOBULIN SER CALC-MCNC: 3.6 G/DL (ref 2.3–3.5)
GLUCOSE SERPL-MCNC: 119 MG/DL (ref 70–99)
HBA1C MFR BLD: 6.2 % (ref 0–5.6)
HCT VFR BLD AUTO: 36.7 % (ref 35.8–46.3)
HDLC SERPL-MCNC: 65 MG/DL (ref 40–60)
HDLC SERPL: 3.1 (ref 0–5)
HGB BLD-MCNC: 12.2 G/DL (ref 11.7–15.4)
IMM GRANULOCYTES # BLD AUTO: 0 K/UL (ref 0–0.5)
IMM GRANULOCYTES NFR BLD AUTO: 0 % (ref 0–5)
LDLC SERPL CALC-MCNC: 120 MG/DL (ref 0–100)
LYMPHOCYTES # BLD: 1.8 K/UL (ref 0.5–4.6)
LYMPHOCYTES NFR BLD: 41 % (ref 13–44)
MCH RBC QN AUTO: 31.9 PG (ref 26.1–32.9)
MCHC RBC AUTO-ENTMCNC: 33.2 G/DL (ref 31.4–35)
MCV RBC AUTO: 96.1 FL (ref 82–102)
MONOCYTES # BLD: 0.6 K/UL (ref 0.1–1.3)
MONOCYTES NFR BLD: 14 % (ref 4–12)
NEUTS SEG # BLD: 1.9 K/UL (ref 1.7–8.2)
NEUTS SEG NFR BLD: 41 % (ref 43–78)
NRBC # BLD: 0 K/UL (ref 0–0.2)
PLATELET # BLD AUTO: 240 K/UL (ref 150–450)
PMV BLD AUTO: 10.3 FL (ref 9.4–12.3)
POTASSIUM SERPL-SCNC: 4.1 MMOL/L (ref 3.5–5.1)
PROT SERPL-MCNC: 7.8 G/DL (ref 6.3–8.2)
RBC # BLD AUTO: 3.82 M/UL (ref 4.05–5.2)
SODIUM SERPL-SCNC: 140 MMOL/L (ref 136–145)
TRIGL SERPL-MCNC: 80 MG/DL (ref 0–150)
VLDLC SERPL CALC-MCNC: 16 MG/DL (ref 6–23)
WBC # BLD AUTO: 4.3 K/UL (ref 4.3–11.1)

## 2024-07-24 ENCOUNTER — OFFICE VISIT (OUTPATIENT)
Dept: NEUROLOGY | Age: 69
End: 2024-07-24

## 2024-07-24 VITALS
WEIGHT: 193 LBS | HEART RATE: 73 BPM | BODY MASS INDEX: 34.2 KG/M2 | HEIGHT: 63 IN | SYSTOLIC BLOOD PRESSURE: 156 MMHG | OXYGEN SATURATION: 95 % | DIASTOLIC BLOOD PRESSURE: 89 MMHG

## 2024-07-24 DIAGNOSIS — G25.0 ESSENTIAL TREMOR: ICD-10-CM

## 2024-07-24 RX ORDER — TOPIRAMATE 25 MG/1
25 TABLET ORAL 2 TIMES DAILY
Qty: 60 TABLET | Refills: 5 | Status: SHIPPED | OUTPATIENT
Start: 2024-07-24

## 2024-07-24 NOTE — PROGRESS NOTES
1    [DISCONTINUED] metoprolol succinate (TOPROL XL) 50 MG extended release tablet Take 1 tablet by mouth nightly 90 tablet 1    [DISCONTINUED] escitalopram (LEXAPRO) 5 MG tablet Take 1 tablet by mouth daily For anxiety 90 tablet 1    vitamin D (CHOLECALCIFEROL) 25 MCG (1000 UT) TABS tablet Take 1 tablet by mouth in the morning and at bedtime      acetaminophen (TYLENOL) 650 MG extended release tablet Take 2 tablets by mouth every 8 hours as needed      [DISCONTINUED] topiramate (TOPAMAX) 25 MG tablet Take 1 tablet by mouth 2 times daily 60 tablet 5    celecoxib (CELEBREX) 200 MG capsule Take 1 capsule by mouth 2 times daily as needed for Pain (Take with food) 60 capsule 0    [DISCONTINUED] diphenhydrAMINE (BENADRYL) 25 MG capsule Take 1 capsule by mouth every 6 hours as needed (Patient not taking: Reported on 6/27/2024)       No facility-administered encounter medications on file as of 7/24/2024.       ALLERGIES:  Allergies   Allergen Reactions    Penicillins Hives         Physical Exam:     BP (!) 156/89 (Site: Right Upper Arm, Position: Sitting, Cuff Size: Medium Adult)   Pulse 73   Ht 1.6 m (5' 3\")   Wt 87.5 kg (193 lb)   SpO2 95%   BMI 34.19 kg/m²     General Exam:  General: No apparent distress.  HEENT: Normocephalic, Sclera anicteric, Oropharynx clear.   Neck: No visible JVD  Lungs: Non-labored breathing  Abdomen: Non-distended  Extremities: No visible rashes    Neurological Exam:   MS/Language/Speech:  Alert. Oriented to person, place, time, and situation. Language fluent. Speech normal; no hypophonia or vocal tremor.    Cranial Nerves: PERRL. Eye movements intact. No nystagmus. Facial expressions symmetric. Normal sensation. Tongue and palate midline. Shoulder shrug bilaterally symmetric.    Motor : Strength was intact and appropriate in all proximal and distal muscle groups. Tone was normal.     Abnormal Movements: Very mild postural tremor of the hands; R>L. Minimal action tremor with

## 2024-07-26 ENCOUNTER — HOSPITAL ENCOUNTER (OUTPATIENT)
Dept: MAMMOGRAPHY | Age: 69
End: 2024-07-26
Attending: INTERNAL MEDICINE
Payer: MEDICARE

## 2024-07-26 DIAGNOSIS — Z12.31 ENCOUNTER FOR SCREENING MAMMOGRAM FOR MALIGNANT NEOPLASM OF BREAST: ICD-10-CM

## 2024-07-26 PROCEDURE — 77063 BREAST TOMOSYNTHESIS BI: CPT

## 2024-08-01 ENCOUNTER — OFFICE VISIT (OUTPATIENT)
Dept: FAMILY MEDICINE CLINIC | Facility: CLINIC | Age: 69
End: 2024-08-01

## 2024-08-01 VITALS
OXYGEN SATURATION: 98 % | WEIGHT: 193.4 LBS | BODY MASS INDEX: 34.27 KG/M2 | HEIGHT: 63 IN | HEART RATE: 67 BPM | DIASTOLIC BLOOD PRESSURE: 82 MMHG | SYSTOLIC BLOOD PRESSURE: 134 MMHG | RESPIRATION RATE: 16 BRPM

## 2024-08-01 DIAGNOSIS — Z78.0 POST-MENOPAUSAL: ICD-10-CM

## 2024-08-01 DIAGNOSIS — F41.9 ANXIETY: ICD-10-CM

## 2024-08-01 DIAGNOSIS — Z12.11 COLON CANCER SCREENING: ICD-10-CM

## 2024-08-01 DIAGNOSIS — R73.03 PREDIABETES: ICD-10-CM

## 2024-08-01 DIAGNOSIS — I10 ESSENTIAL HYPERTENSION: Primary | ICD-10-CM

## 2024-08-01 DIAGNOSIS — E78.2 MIXED HYPERLIPIDEMIA: ICD-10-CM

## 2024-08-01 RX ORDER — ESCITALOPRAM OXALATE 10 MG/1
10 TABLET ORAL DAILY
Qty: 90 TABLET | Refills: 1 | Status: SHIPPED | OUTPATIENT
Start: 2024-08-01

## 2024-08-01 RX ORDER — METOPROLOL SUCCINATE 50 MG/1
50 TABLET, EXTENDED RELEASE ORAL
Qty: 90 TABLET | Refills: 1 | Status: SHIPPED | OUTPATIENT
Start: 2024-08-01

## 2024-08-01 RX ORDER — TRIAMTERENE AND HYDROCHLOROTHIAZIDE 37.5; 25 MG/1; MG/1
1 TABLET ORAL DAILY
Qty: 90 TABLET | Refills: 1 | Status: SHIPPED | OUTPATIENT
Start: 2024-08-01

## 2024-08-01 RX ORDER — EZETIMIBE 10 MG/1
10 TABLET ORAL DAILY
Qty: 90 TABLET | Refills: 1 | Status: SHIPPED | OUTPATIENT
Start: 2024-08-01

## 2024-08-01 RX ORDER — ROSUVASTATIN CALCIUM 40 MG/1
40 TABLET, COATED ORAL DAILY
Qty: 90 TABLET | Refills: 1 | Status: SHIPPED | OUTPATIENT
Start: 2024-08-01

## 2024-08-01 SDOH — ECONOMIC STABILITY: FOOD INSECURITY: WITHIN THE PAST 12 MONTHS, YOU WORRIED THAT YOUR FOOD WOULD RUN OUT BEFORE YOU GOT MONEY TO BUY MORE.: NEVER TRUE

## 2024-08-01 SDOH — ECONOMIC STABILITY: FOOD INSECURITY: WITHIN THE PAST 12 MONTHS, THE FOOD YOU BOUGHT JUST DIDN'T LAST AND YOU DIDN'T HAVE MONEY TO GET MORE.: NEVER TRUE

## 2024-08-01 SDOH — ECONOMIC STABILITY: INCOME INSECURITY: HOW HARD IS IT FOR YOU TO PAY FOR THE VERY BASICS LIKE FOOD, HOUSING, MEDICAL CARE, AND HEATING?: NOT HARD AT ALL

## 2024-08-01 ASSESSMENT — PATIENT HEALTH QUESTIONNAIRE - PHQ9
SUM OF ALL RESPONSES TO PHQ QUESTIONS 1-9: 0
SUM OF ALL RESPONSES TO PHQ QUESTIONS 1-9: 0
SUM OF ALL RESPONSES TO PHQ9 QUESTIONS 1 & 2: 0
SUM OF ALL RESPONSES TO PHQ QUESTIONS 1-9: 0
1. LITTLE INTEREST OR PLEASURE IN DOING THINGS: NOT AT ALL
SUM OF ALL RESPONSES TO PHQ QUESTIONS 1-9: 0
2. FEELING DOWN, DEPRESSED OR HOPELESS: NOT AT ALL

## 2024-08-01 ASSESSMENT — ENCOUNTER SYMPTOMS
VOMITING: 0
SHORTNESS OF BREATH: 0
NAUSEA: 0
ABDOMINAL PAIN: 0
BLOOD IN STOOL: 0
COUGH: 0

## 2024-08-01 NOTE — PROGRESS NOTES
Bilirubin 07/22/2024 0.4  0.0 - 1.2 MG/DL Final    ALT 07/22/2024 12  12 - 65 U/L Final    AST 07/22/2024 18  15 - 37 U/L Final    Alk Phosphatase 07/22/2024 70  35 - 104 U/L Final    Total Protein 07/22/2024 7.8  6.3 - 8.2 g/dL Final    Albumin 07/22/2024 4.1  3.2 - 4.6 g/dL Final    Globulin 07/22/2024 3.6 (H)  2.3 - 3.5 g/dL Final    Albumin/Globulin Ratio 07/22/2024 1.1  1.0 - 1.9   Final    WBC 07/22/2024 4.3  4.3 - 11.1 K/uL Final    RBC 07/22/2024 3.82 (L)  4.05 - 5.2 M/uL Final    Hemoglobin 07/22/2024 12.2  11.7 - 15.4 g/dL Final    Hematocrit 07/22/2024 36.7  35.8 - 46.3 % Final    MCV 07/22/2024 96.1  82 - 102 FL Final    MCH 07/22/2024 31.9  26.1 - 32.9 PG Final    MCHC 07/22/2024 33.2  31.4 - 35.0 g/dL Final    RDW 07/22/2024 13.9  11.9 - 14.6 % Final    Platelets 07/22/2024 240  150 - 450 K/uL Final    MPV 07/22/2024 10.3  9.4 - 12.3 FL Final    nRBC 07/22/2024 0.00  0.0 - 0.2 K/uL Final    **Note: Absolute NRBC parameter is now reported with Hemogram**    Differential Type 07/22/2024 AUTOMATED    Final    Neutrophils % 07/22/2024 41 (L)  43 - 78 % Final    Lymphocytes % 07/22/2024 41  13 - 44 % Final    Monocytes % 07/22/2024 14 (H)  4.0 - 12.0 % Final    Eosinophils % 07/22/2024 3  0.5 - 7.8 % Final    Basophils % 07/22/2024 1  0.0 - 2.0 % Final    Immature Granulocytes % 07/22/2024 0  0.0 - 5.0 % Final    Neutrophils Absolute 07/22/2024 1.9  1.7 - 8.2 K/UL Final    Lymphocytes Absolute 07/22/2024 1.8  0.5 - 4.6 K/UL Final    Monocytes Absolute 07/22/2024 0.6  0.1 - 1.3 K/UL Final    Eosinophils Absolute 07/22/2024 0.1  0.0 - 0.8 K/UL Final    Basophils Absolute 07/22/2024 0.0  0.0 - 0.2 K/UL Final    Immature Granulocytes Absolute 07/22/2024 0.0  0.0 - 0.5 K/UL Final           Damaris Chen is a 68 y.o. female who is seen for evaluation of   Chief Complaint   Patient presents with    Follow-up Chronic Condition    Hypertension       HPI:   Here today to follow-up chronic issues.  She had labs

## 2024-08-01 NOTE — PATIENT INSTRUCTIONS
Patient Education        Learning About Eating More Fruits and Vegetables  What are some quick tips for eating more fruits and vegetables?     We're all encouraged to eat more fruits and vegetables. Yet it can seem like one more chore on the daily to-do list. But you can add color and crunch to your meals--and lots of nutrition--with these quick tips.  Brighten up your breakfast.  Add sliced fruit or frozen berries to your yogurt, pancakes, or cereal.  Blend fresh or frozen fruit, veggies, and yogurt with a little fruit juice, and you've got a tasty smoothie.  Make your scrambled eggs a gourmet treat by adding onions, celery, and bell peppers.  Bake up some bran muffins with grated carrots added into the mix.  Make a livelier lunch.  Jazz up tuna or chicken salad with apple chunks, celery, or grapes--or all of them!  Add cucumbers, avocado slices, tomatoes, and lettuce to your sandwiches.  Kick up the flavor of grilled cheese sandwiches with spinach and tomatoes.  Puree some cooked carrots or squash to add to tomato soup.  Add delicious veggies to dinner.  Give more color and taste to salads. Stir in red cabbage, carrots, and bell peppers. Top salads with dried cranberries or raisins. \"Frost\" your salad with orange sections or strawberries.  Keep a bag or two of frozen vegetables ready to pull out of the freezer for a side dish.  Spice up spaghetti and meatballs with mushrooms and bell peppers.  Roast vegetables like cauliflower or squash in the oven with olive oil to bring out their flavor.  Season your veggie dish with herbs like basil and alee and a splash of lemon juice and olive oil.  Grab some healthy snacks on the go.  Scoop up an apple, banana, or plum for a quick snack.  Cut up raw fruits and veggies to keep in your fridge. Grapes, oranges, carrots, and celery are great choices. They'll be ready for a quick snack or an after-school treat.  Dip raw vegetables in hummus or peanut butter.  Keep dried fruit on

## 2024-08-02 ENCOUNTER — OFFICE VISIT (OUTPATIENT)
Dept: ONCOLOGY | Age: 69
End: 2024-08-02

## 2024-08-02 VITALS
SYSTOLIC BLOOD PRESSURE: 137 MMHG | TEMPERATURE: 97.9 F | HEART RATE: 65 BPM | BODY MASS INDEX: 34.32 KG/M2 | HEIGHT: 63 IN | WEIGHT: 193.7 LBS | OXYGEN SATURATION: 99 % | RESPIRATION RATE: 15 BRPM | DIASTOLIC BLOOD PRESSURE: 93 MMHG

## 2024-08-02 DIAGNOSIS — Z85.3 HISTORY OF BREAST CANCER: Primary | ICD-10-CM

## 2024-08-02 DIAGNOSIS — Z12.31 ENCOUNTER FOR SCREENING MAMMOGRAM FOR MALIGNANT NEOPLASM OF BREAST: ICD-10-CM

## 2024-08-02 ASSESSMENT — PATIENT HEALTH QUESTIONNAIRE - PHQ9
SUM OF ALL RESPONSES TO PHQ QUESTIONS 1-9: 0
SUM OF ALL RESPONSES TO PHQ9 QUESTIONS 1 & 2: 0
1. LITTLE INTEREST OR PLEASURE IN DOING THINGS: NOT AT ALL
SUM OF ALL RESPONSES TO PHQ QUESTIONS 1-9: 0
SUM OF ALL RESPONSES TO PHQ QUESTIONS 1-9: 0
2. FEELING DOWN, DEPRESSED OR HOPELESS: NOT AT ALL
SUM OF ALL RESPONSES TO PHQ QUESTIONS 1-9: 0

## 2024-08-02 NOTE — PROGRESS NOTES
[facility]: Office Visit Established Patient    Chief Complaint:    Chief Complaint   Patient presents with    Follow-up         History of Present Illness:  Ms. Chen is a 68 y.o. female who returns today for management of breast cancer.  She had a screening  mammogram in June 2016 that showed a developing group of calcifications in the upper outer right breast.  These were confirmed on additional views and she went for biopsy which showed DCIS.  She was referred for surgical management and underwent lumpectomy  on 8/26/16 which surprisingly showed two foci of invasive ductal carcinoma in a background of DCIS.  The larger of the two foci was 0.7 cm, and receptors showed 92% ER and 7% NM, but also showed HER2 3+ positive.  She went back for sentinel node biopsy  and reexcision, both of which were negative for residual carcinoma.  She is referred to medical oncology for adjuvant recommendations. Her tumor was small, but HER2 positive, which suggests an increased  risk of recurrence, unless she receives adjuvant chemoimmunotherapy.  Given the T1N0 disease, she would be an ideal candidate for the APT regimen with weekly paclitaxel + trastuzumab for 12 weeks, followed by Herceptin monotherapy to complete a year.   She will complete radiation after chemotherapy and then start endocrine therapy with AI.  She did not tolerate either Arimidex or Femara due to arthralgias, so she was changed to tamoxifen, which was  better tolerated, but she developed endometrial thickening requiring D&C.  Ultimately, she decided to retry Arimidex once again.  We stopped therapy in spring 2022 after completing (close to) 5 years of therapy.       Ms. Chen returns today for follow-up regarding her history of breast cancer.  She continues ot do well overall. She has no new issues or complaints to voice. She has no new breast concerns or changes. Appetite is good, weight is stable. She has no new pain. Fatigue is

## 2024-08-16 ENCOUNTER — HOSPITAL ENCOUNTER (OUTPATIENT)
Dept: MAMMOGRAPHY | Age: 69
End: 2024-08-16
Attending: FAMILY MEDICINE
Payer: MEDICARE

## 2024-08-16 DIAGNOSIS — Z78.0 POST-MENOPAUSAL: ICD-10-CM

## 2024-08-16 PROCEDURE — 77080 DXA BONE DENSITY AXIAL: CPT

## 2024-10-30 ENCOUNTER — TELEPHONE (OUTPATIENT)
Dept: SLEEP MEDICINE | Age: 69
End: 2024-10-30

## 2025-05-28 ENCOUNTER — RESULTS FOLLOW-UP (OUTPATIENT)
Dept: FAMILY MEDICINE CLINIC | Facility: CLINIC | Age: 70
End: 2025-05-28

## 2025-05-28 ENCOUNTER — OFFICE VISIT (OUTPATIENT)
Dept: FAMILY MEDICINE CLINIC | Facility: CLINIC | Age: 70
End: 2025-05-28
Payer: MEDICARE

## 2025-05-28 VITALS
HEART RATE: 98 BPM | BODY MASS INDEX: 35.58 KG/M2 | SYSTOLIC BLOOD PRESSURE: 184 MMHG | OXYGEN SATURATION: 98 % | HEIGHT: 63 IN | DIASTOLIC BLOOD PRESSURE: 110 MMHG | WEIGHT: 200.8 LBS

## 2025-05-28 DIAGNOSIS — J20.9 ACUTE BRONCHITIS, UNSPECIFIED ORGANISM: Primary | ICD-10-CM

## 2025-05-28 DIAGNOSIS — F41.9 ANXIETY: ICD-10-CM

## 2025-05-28 DIAGNOSIS — E78.2 MIXED HYPERLIPIDEMIA: ICD-10-CM

## 2025-05-28 DIAGNOSIS — I49.3 FREQUENT PVCS: ICD-10-CM

## 2025-05-28 DIAGNOSIS — I10 ESSENTIAL HYPERTENSION: ICD-10-CM

## 2025-05-28 LAB
EXP DATE SOLUTION: NORMAL
EXP DATE SWAB: NORMAL
EXPIRATION DATE: NORMAL
LOT NUMBER POC: NORMAL
LOT NUMBER SOLUTION: NORMAL
LOT NUMBER SWAB: NORMAL
RSV, POC: NEGATIVE
SARS-COV-2 RNA, POC: NEGATIVE
VALID INTERNAL CONTROL: NORMAL

## 2025-05-28 PROCEDURE — 1159F MED LIST DOCD IN RCRD: CPT | Performed by: FAMILY MEDICINE

## 2025-05-28 PROCEDURE — 87420 RESP SYNCYTIAL VIRUS AG IA: CPT | Performed by: FAMILY MEDICINE

## 2025-05-28 PROCEDURE — 3077F SYST BP >= 140 MM HG: CPT | Performed by: FAMILY MEDICINE

## 2025-05-28 PROCEDURE — 1036F TOBACCO NON-USER: CPT | Performed by: FAMILY MEDICINE

## 2025-05-28 PROCEDURE — G8427 DOCREV CUR MEDS BY ELIG CLIN: HCPCS | Performed by: FAMILY MEDICINE

## 2025-05-28 PROCEDURE — 1123F ACP DISCUSS/DSCN MKR DOCD: CPT | Performed by: FAMILY MEDICINE

## 2025-05-28 PROCEDURE — 3080F DIAST BP >= 90 MM HG: CPT | Performed by: FAMILY MEDICINE

## 2025-05-28 PROCEDURE — G2211 COMPLEX E/M VISIT ADD ON: HCPCS | Performed by: FAMILY MEDICINE

## 2025-05-28 PROCEDURE — G8417 CALC BMI ABV UP PARAM F/U: HCPCS | Performed by: FAMILY MEDICINE

## 2025-05-28 PROCEDURE — 93000 ELECTROCARDIOGRAM COMPLETE: CPT | Performed by: FAMILY MEDICINE

## 2025-05-28 PROCEDURE — 99214 OFFICE O/P EST MOD 30 MIN: CPT | Performed by: FAMILY MEDICINE

## 2025-05-28 PROCEDURE — 1160F RVW MEDS BY RX/DR IN RCRD: CPT | Performed by: FAMILY MEDICINE

## 2025-05-28 PROCEDURE — 87635 SARS-COV-2 COVID-19 AMP PRB: CPT | Performed by: FAMILY MEDICINE

## 2025-05-28 PROCEDURE — 1090F PRES/ABSN URINE INCON ASSESS: CPT | Performed by: FAMILY MEDICINE

## 2025-05-28 PROCEDURE — 3017F COLORECTAL CA SCREEN DOC REV: CPT | Performed by: FAMILY MEDICINE

## 2025-05-28 PROCEDURE — G8399 PT W/DXA RESULTS DOCUMENT: HCPCS | Performed by: FAMILY MEDICINE

## 2025-05-28 RX ORDER — EZETIMIBE 10 MG/1
10 TABLET ORAL DAILY
Qty: 30 TABLET | Refills: 0 | Status: SHIPPED | OUTPATIENT
Start: 2025-05-28

## 2025-05-28 RX ORDER — METOPROLOL SUCCINATE 50 MG/1
50 TABLET, EXTENDED RELEASE ORAL
Qty: 30 TABLET | Refills: 0 | Status: SHIPPED | OUTPATIENT
Start: 2025-05-28

## 2025-05-28 RX ORDER — TRIAMTERENE AND HYDROCHLOROTHIAZIDE 37.5; 25 MG/1; MG/1
1 TABLET ORAL DAILY
Qty: 30 TABLET | Refills: 0 | Status: SHIPPED | OUTPATIENT
Start: 2025-05-28

## 2025-05-28 RX ORDER — ROSUVASTATIN CALCIUM 40 MG/1
40 TABLET, COATED ORAL DAILY
Qty: 30 TABLET | Refills: 0 | Status: SHIPPED | OUTPATIENT
Start: 2025-05-28

## 2025-05-28 RX ORDER — BENZONATATE 200 MG/1
200 CAPSULE ORAL 3 TIMES DAILY PRN
Qty: 21 CAPSULE | Refills: 0 | Status: SHIPPED | OUTPATIENT
Start: 2025-05-28 | End: 2025-06-04

## 2025-05-28 RX ORDER — ALBUTEROL SULFATE 90 UG/1
2 INHALANT RESPIRATORY (INHALATION) 4 TIMES DAILY PRN
Qty: 4 EACH | Refills: 0 | Status: SHIPPED | OUTPATIENT
Start: 2025-05-28 | End: 2025-06-27

## 2025-05-28 RX ORDER — ESCITALOPRAM OXALATE 10 MG/1
10 TABLET ORAL DAILY
Qty: 30 TABLET | Refills: 0 | Status: SHIPPED | OUTPATIENT
Start: 2025-05-28

## 2025-05-28 SDOH — ECONOMIC STABILITY: FOOD INSECURITY: WITHIN THE PAST 12 MONTHS, YOU WORRIED THAT YOUR FOOD WOULD RUN OUT BEFORE YOU GOT MONEY TO BUY MORE.: NEVER TRUE

## 2025-05-28 SDOH — ECONOMIC STABILITY: FOOD INSECURITY: WITHIN THE PAST 12 MONTHS, THE FOOD YOU BOUGHT JUST DIDN'T LAST AND YOU DIDN'T HAVE MONEY TO GET MORE.: NEVER TRUE

## 2025-05-28 ASSESSMENT — PATIENT HEALTH QUESTIONNAIRE - PHQ9
SUM OF ALL RESPONSES TO PHQ QUESTIONS 1-9: 0
SUM OF ALL RESPONSES TO PHQ QUESTIONS 1-9: 0
2. FEELING DOWN, DEPRESSED OR HOPELESS: NOT AT ALL
SUM OF ALL RESPONSES TO PHQ QUESTIONS 1-9: 0
SUM OF ALL RESPONSES TO PHQ QUESTIONS 1-9: 0
1. LITTLE INTEREST OR PLEASURE IN DOING THINGS: NOT AT ALL

## 2025-05-28 ASSESSMENT — ENCOUNTER SYMPTOMS
COUGH: 1
SORE THROAT: 0
NAUSEA: 0
CHEST TIGHTNESS: 1
VOMITING: 0
ABDOMINAL PAIN: 0
WHEEZING: 1
SINUS PRESSURE: 0
RHINORRHEA: 1
SHORTNESS OF BREATH: 1
DIARRHEA: 0
SINUS PAIN: 0

## 2025-05-28 NOTE — PROGRESS NOTES
[facility]  Emily Melendez Nick, DO  [address] [facility] [place] [address]   No:  [phone]  Fax:  [phone]            Assessment & Plan  1. Acute bronchitis.  Both COVID-19 and RSV tests returned negative results. However, given that her daughter tested positive for RSV yesterday, it is suspected that her condition may be due to RSV. She has been informed that Tessalon Perles should be kept out of reach of children as it can be fatal if ingested by them. Albuterol and Tessalon Perles have been prescribed.    2. Essential hypertension.  Her blood pressure was significantly elevated during today's visit and upon recheck. She admitted to frequently missing her antihypertensive medication. Her last visit was in 08/2024, and she has since been lost to follow-up. A 6-month supply of medication was prescribed during her last visit, which should have lasted until early 02/2025. It is suspected that she may be missing more doses than she realizes. She has agreed to improve her medication compliance, including metoprolol succinate 50 mg and triamterene-hydrochlorothiazide. She will follow up in a month for reevaluation with routine blood work prior as we routinely did prior.    3. Anxiety.  During her last appointment, her Lexapro dose was increased from 5 mg to 10 mg. She did not attend her scheduled 6-week follow-up. She reports feeling okay but mentions some stress at home, which she did not elaborate on, and does not feel the need to increase her dose further.    4. Mixed hyperlipidemia.  She is currently on ezetimibe and rosuvastatin. She is due for follow-up lab work, which should be completed before her next appointment.    5. Frequent PVCs.  This was an incidental finding on physical exam with ventricular trigeminy noted on EKG. She is scheduled to see cardiology at the end of 06/2025 for her yearly follow-up. She has been reminded of this appointment.    Follow-up  The patient will

## 2025-06-01 ENCOUNTER — PATIENT MESSAGE (OUTPATIENT)
Age: 70
End: 2025-06-01

## 2025-06-02 ENCOUNTER — TELEPHONE (OUTPATIENT)
Dept: FAMILY MEDICINE CLINIC | Facility: CLINIC | Age: 70
End: 2025-06-02

## 2025-06-02 NOTE — TELEPHONE ENCOUNTER
Patient called requesting a callback from the nurse in regards to heart rate states she has been getting readings from yesterday and today and they are all from 39-48.

## 2025-06-02 NOTE — TELEPHONE ENCOUNTER
Returned call to pt. Advised to contact her cardiologist. Advised per review of her chart, the cardiology office has tried to contact her.

## 2025-06-19 ENCOUNTER — LAB (OUTPATIENT)
Dept: FAMILY MEDICINE CLINIC | Facility: CLINIC | Age: 70
End: 2025-06-19

## 2025-06-19 DIAGNOSIS — E78.2 MIXED HYPERLIPIDEMIA: ICD-10-CM

## 2025-06-19 DIAGNOSIS — R73.03 PREDIABETES: ICD-10-CM

## 2025-06-19 DIAGNOSIS — I49.3 FREQUENT PVCS: ICD-10-CM

## 2025-06-19 DIAGNOSIS — I10 ESSENTIAL HYPERTENSION: ICD-10-CM

## 2025-06-19 DIAGNOSIS — R80.9 MICROALBUMINURIA: ICD-10-CM

## 2025-06-19 LAB
ALBUMIN SERPL-MCNC: 3.9 G/DL (ref 3.2–4.6)
ALBUMIN/GLOB SERPL: 1.1 (ref 1–1.9)
ALP SERPL-CCNC: 77 U/L (ref 35–104)
ALT SERPL-CCNC: 13 U/L (ref 8–45)
ANION GAP SERPL CALC-SCNC: 12 MMOL/L (ref 7–16)
AST SERPL-CCNC: 17 U/L (ref 15–37)
BASOPHILS # BLD: 0.02 K/UL (ref 0–0.2)
BASOPHILS NFR BLD: 0.4 % (ref 0–2)
BILIRUB SERPL-MCNC: 0.4 MG/DL (ref 0–1.2)
BUN SERPL-MCNC: 24 MG/DL (ref 8–23)
CALCIUM SERPL-MCNC: 9.6 MG/DL (ref 8.8–10.2)
CHLORIDE SERPL-SCNC: 105 MMOL/L (ref 98–107)
CHOLEST SERPL-MCNC: 168 MG/DL (ref 0–200)
CO2 SERPL-SCNC: 25 MMOL/L (ref 20–29)
CREAT SERPL-MCNC: 0.98 MG/DL (ref 0.6–1.1)
CREAT UR-MCNC: 122 MG/DL (ref 28–217)
DIFFERENTIAL METHOD BLD: ABNORMAL
EOSINOPHIL # BLD: 0.12 K/UL (ref 0–0.8)
EOSINOPHIL NFR BLD: 2.6 % (ref 0.5–7.8)
ERYTHROCYTE [DISTWIDTH] IN BLOOD BY AUTOMATED COUNT: 13.3 % (ref 11.9–14.6)
EST. AVERAGE GLUCOSE BLD GHB EST-MCNC: 132 MG/DL
GLOBULIN SER CALC-MCNC: 3.6 G/DL (ref 2.3–3.5)
GLUCOSE SERPL-MCNC: 116 MG/DL (ref 70–99)
HBA1C MFR BLD: 6.2 % (ref 0–5.6)
HCT VFR BLD AUTO: 34.9 % (ref 35.8–46.3)
HDLC SERPL-MCNC: 51 MG/DL (ref 40–60)
HDLC SERPL: 3.3 (ref 0–5)
HGB BLD-MCNC: 11.9 G/DL (ref 11.7–15.4)
IMM GRANULOCYTES # BLD AUTO: 0.01 K/UL (ref 0–0.5)
IMM GRANULOCYTES NFR BLD AUTO: 0.2 % (ref 0–5)
LDLC SERPL CALC-MCNC: 103 MG/DL (ref 0–100)
LYMPHOCYTES # BLD: 2.05 K/UL (ref 0.5–4.6)
LYMPHOCYTES NFR BLD: 45.1 % (ref 13–44)
MAGNESIUM SERPL-MCNC: 2.1 MG/DL (ref 1.8–2.4)
MCH RBC QN AUTO: 30.7 PG (ref 26.1–32.9)
MCHC RBC AUTO-ENTMCNC: 34.1 G/DL (ref 31.4–35)
MCV RBC AUTO: 90.2 FL (ref 82–102)
MICROALBUMIN UR-MCNC: 6.52 MG/DL (ref 0–20)
MICROALBUMIN/CREAT UR-RTO: 53 MG/G (ref 0–30)
MONOCYTES # BLD: 0.48 K/UL (ref 0.1–1.3)
MONOCYTES NFR BLD: 10.5 % (ref 4–12)
NEUTS SEG # BLD: 1.87 K/UL (ref 1.7–8.2)
NEUTS SEG NFR BLD: 41.2 % (ref 43–78)
NRBC # BLD: 0 K/UL (ref 0–0.2)
PLATELET # BLD AUTO: 216 K/UL (ref 150–450)
PMV BLD AUTO: 10.4 FL (ref 9.4–12.3)
POTASSIUM SERPL-SCNC: 4.3 MMOL/L (ref 3.5–5.1)
PROT SERPL-MCNC: 7.5 G/DL (ref 6.3–8.2)
RBC # BLD AUTO: 3.87 M/UL (ref 4.05–5.2)
SODIUM SERPL-SCNC: 141 MMOL/L (ref 136–145)
TRIGL SERPL-MCNC: 68 MG/DL (ref 0–150)
TSH, 3RD GENERATION: 1.04 UIU/ML (ref 0.27–4.2)
VLDLC SERPL CALC-MCNC: 14 MG/DL (ref 6–23)
WBC # BLD AUTO: 4.6 K/UL (ref 4.3–11.1)

## 2025-06-23 ENCOUNTER — TELEPHONE (OUTPATIENT)
Dept: CARDIOLOGY | Age: 70
End: 2025-06-23

## 2025-06-23 DIAGNOSIS — I10 ESSENTIAL HYPERTENSION: ICD-10-CM

## 2025-06-23 DIAGNOSIS — I47.10 SVT (SUPRAVENTRICULAR TACHYCARDIA): Primary | ICD-10-CM

## 2025-06-23 DIAGNOSIS — I49.1 PREMATURE ATRIAL CONTRACTIONS: ICD-10-CM

## 2025-06-23 NOTE — TELEPHONE ENCOUNTER
Reviewed monitor ordered by patient's PCP.  Having high burden PVC's.  Please arrange an echocardiogram and office follow up afterwards.  She is on 50 mg metoprolol.  If she's willing to take that to 100 until follow up to see if it helps let's try that. Of course, no caffeine or other stimulants, hydrate, etc.  ty

## 2025-06-24 RX ORDER — METOPROLOL SUCCINATE 100 MG/1
100 TABLET, EXTENDED RELEASE ORAL
Qty: 90 TABLET | Refills: 3 | Status: SHIPPED | OUTPATIENT
Start: 2025-06-24

## 2025-06-24 NOTE — TELEPHONE ENCOUNTER
I called and went over MD response w/pt.and she v/u.I placed order for ECHO and escribed med as below.    Note sent to scheduling to schedule ECHO and fu after ECHO w/.  Requested Prescriptions     Signed Prescriptions Disp Refills    metoprolol succinate (TOPROL XL) 100 MG extended release tablet 90 tablet 3     Sig: Take 1 tablet by mouth nightly     Authorizing Provider: DIGNA AVENDAÑO     Ordering User: ROBIN ROSALES

## 2025-06-25 ENCOUNTER — OFFICE VISIT (OUTPATIENT)
Dept: FAMILY MEDICINE CLINIC | Facility: CLINIC | Age: 70
End: 2025-06-25
Payer: MEDICARE

## 2025-06-25 VITALS
DIASTOLIC BLOOD PRESSURE: 84 MMHG | SYSTOLIC BLOOD PRESSURE: 130 MMHG | HEIGHT: 63 IN | OXYGEN SATURATION: 98 % | BODY MASS INDEX: 35.51 KG/M2 | WEIGHT: 200.4 LBS | HEART RATE: 37 BPM

## 2025-06-25 DIAGNOSIS — N18.2 CKD STAGE G2/A2, GFR 60-89 AND ALBUMIN CREATININE RATIO 30-299 MG/G: ICD-10-CM

## 2025-06-25 DIAGNOSIS — I49.3 FREQUENT PVCS: ICD-10-CM

## 2025-06-25 DIAGNOSIS — F41.9 ANXIETY: ICD-10-CM

## 2025-06-25 DIAGNOSIS — Z00.00 MEDICARE ANNUAL WELLNESS VISIT, SUBSEQUENT: Primary | ICD-10-CM

## 2025-06-25 DIAGNOSIS — R73.03 PREDIABETES: ICD-10-CM

## 2025-06-25 DIAGNOSIS — I10 ESSENTIAL HYPERTENSION: ICD-10-CM

## 2025-06-25 DIAGNOSIS — I49.9 IRREGULAR HEART RATE: ICD-10-CM

## 2025-06-25 DIAGNOSIS — J30.2 SEASONAL ALLERGIES: ICD-10-CM

## 2025-06-25 DIAGNOSIS — E78.2 MIXED HYPERLIPIDEMIA: ICD-10-CM

## 2025-06-25 PROCEDURE — 1036F TOBACCO NON-USER: CPT | Performed by: FAMILY MEDICINE

## 2025-06-25 PROCEDURE — 99214 OFFICE O/P EST MOD 30 MIN: CPT | Performed by: FAMILY MEDICINE

## 2025-06-25 PROCEDURE — 3079F DIAST BP 80-89 MM HG: CPT | Performed by: FAMILY MEDICINE

## 2025-06-25 PROCEDURE — 3075F SYST BP GE 130 - 139MM HG: CPT | Performed by: FAMILY MEDICINE

## 2025-06-25 PROCEDURE — 93000 ELECTROCARDIOGRAM COMPLETE: CPT | Performed by: FAMILY MEDICINE

## 2025-06-25 PROCEDURE — 1090F PRES/ABSN URINE INCON ASSESS: CPT | Performed by: FAMILY MEDICINE

## 2025-06-25 PROCEDURE — 1123F ACP DISCUSS/DSCN MKR DOCD: CPT | Performed by: FAMILY MEDICINE

## 2025-06-25 PROCEDURE — G0439 PPPS, SUBSEQ VISIT: HCPCS | Performed by: FAMILY MEDICINE

## 2025-06-25 PROCEDURE — 1159F MED LIST DOCD IN RCRD: CPT | Performed by: FAMILY MEDICINE

## 2025-06-25 PROCEDURE — G2211 COMPLEX E/M VISIT ADD ON: HCPCS | Performed by: FAMILY MEDICINE

## 2025-06-25 PROCEDURE — G8399 PT W/DXA RESULTS DOCUMENT: HCPCS | Performed by: FAMILY MEDICINE

## 2025-06-25 PROCEDURE — 1160F RVW MEDS BY RX/DR IN RCRD: CPT | Performed by: FAMILY MEDICINE

## 2025-06-25 PROCEDURE — G8417 CALC BMI ABV UP PARAM F/U: HCPCS | Performed by: FAMILY MEDICINE

## 2025-06-25 PROCEDURE — 3017F COLORECTAL CA SCREEN DOC REV: CPT | Performed by: FAMILY MEDICINE

## 2025-06-25 PROCEDURE — G8427 DOCREV CUR MEDS BY ELIG CLIN: HCPCS | Performed by: FAMILY MEDICINE

## 2025-06-25 RX ORDER — ROSUVASTATIN CALCIUM 40 MG/1
40 TABLET, COATED ORAL DAILY
Qty: 90 TABLET | Refills: 1 | Status: SHIPPED | OUTPATIENT
Start: 2025-06-25

## 2025-06-25 RX ORDER — FLUTICASONE PROPIONATE 50 MCG
2 SPRAY, SUSPENSION (ML) NASAL DAILY
Qty: 16 G | Refills: 5 | Status: SHIPPED | OUTPATIENT
Start: 2025-06-25

## 2025-06-25 RX ORDER — EZETIMIBE 10 MG/1
10 TABLET ORAL DAILY
Qty: 90 TABLET | Refills: 1 | Status: SHIPPED | OUTPATIENT
Start: 2025-06-25

## 2025-06-25 RX ORDER — LORATADINE 10 MG/1
10 TABLET ORAL DAILY PRN
Qty: 30 TABLET | Refills: 5 | Status: SHIPPED | OUTPATIENT
Start: 2025-06-25

## 2025-06-25 RX ORDER — ESCITALOPRAM OXALATE 20 MG/1
20 TABLET ORAL DAILY
Qty: 30 TABLET | Refills: 1 | Status: SHIPPED | OUTPATIENT
Start: 2025-06-25

## 2025-06-25 ASSESSMENT — PATIENT HEALTH QUESTIONNAIRE - PHQ9
SUM OF ALL RESPONSES TO PHQ QUESTIONS 1-9: 0
1. LITTLE INTEREST OR PLEASURE IN DOING THINGS: NOT AT ALL
SUM OF ALL RESPONSES TO PHQ QUESTIONS 1-9: 0
SUM OF ALL RESPONSES TO PHQ QUESTIONS 1-9: 0
2. FEELING DOWN, DEPRESSED OR HOPELESS: NOT AT ALL
SUM OF ALL RESPONSES TO PHQ QUESTIONS 1-9: 0

## 2025-06-25 ASSESSMENT — ENCOUNTER SYMPTOMS
BLOOD IN STOOL: 0
NAUSEA: 0
SHORTNESS OF BREATH: 0
VOMITING: 0
ABDOMINAL PAIN: 0
COUGH: 0
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
## 2025-07-03 ENCOUNTER — PATIENT MESSAGE (OUTPATIENT)
Dept: FAMILY MEDICINE CLINIC | Facility: CLINIC | Age: 70
End: 2025-07-03

## 2025-07-03 DIAGNOSIS — I10 ESSENTIAL HYPERTENSION: Primary | ICD-10-CM

## 2025-07-07 ENCOUNTER — RESULTS FOLLOW-UP (OUTPATIENT)
Dept: CARDIOLOGY | Age: 70
End: 2025-07-07

## 2025-07-07 RX ORDER — HYDROCHLOROTHIAZIDE 12.5 MG/1
12.5 CAPSULE ORAL EVERY MORNING
Qty: 30 CAPSULE | Refills: 0 | Status: SHIPPED | OUTPATIENT
Start: 2025-07-07

## 2025-07-16 NOTE — PROGRESS NOTES
PLAN    Damaris was seen today for follow-up and hypertension.    Diagnoses and all orders for this visit:    Palpitations    PVC's (premature ventricular contractions)    SVT (supraventricular tachycardia)    Mixed hyperlipidemia    Obstructive sleep apnea syndrome    Shortness of breath  -     Nuclear stress test with myocardial perfusion; Future  -     XR CHEST (2 VW); Future  -     Brain Natriuretic Peptide; Future    Other orders  -     flecainide (TAMBOCOR) 50 MG tablet; Take 1 tablet by mouth 2 times daily          IMPRESSION:    Dyspnea, fatigue, and highly symptomatic PVC's.  Preserved EF.  On high dose metoprolol without much improvement and possibly exacerbating fatigue which is also being driven by CPAP intolerance/noncompliance.  In addition to following up with her sleep provider I will:    -check NT-pBNP (chemistries and hgb normal on recent labs)  -2 view CXR  - stress perfusion study. Moderate to high pre test probability of disease is class IIa indication for imaging protocol  - trial flecainide for PVC suppression, consider EP evaluation pending course.  EF preserved but highly symptomatic.              Return for LABS TODAY, 2 WEEKS EKG ONLY, AFTER PROCEDURE TO REVIEW RESULTS.          Thank you for allowing me to participate in this patient's care.  Please call or contact me if there are any questions or concerns regarding the above.      DIGNA AVENDAÑO MD  07/17/25  8:21 AM

## 2025-07-17 ENCOUNTER — OFFICE VISIT (OUTPATIENT)
Age: 70
End: 2025-07-17
Payer: MEDICARE

## 2025-07-17 VITALS
SYSTOLIC BLOOD PRESSURE: 180 MMHG | WEIGHT: 198 LBS | HEART RATE: 52 BPM | DIASTOLIC BLOOD PRESSURE: 100 MMHG | HEIGHT: 63 IN | BODY MASS INDEX: 35.08 KG/M2

## 2025-07-17 DIAGNOSIS — I47.10 SVT (SUPRAVENTRICULAR TACHYCARDIA): ICD-10-CM

## 2025-07-17 DIAGNOSIS — R00.2 PALPITATIONS: Primary | ICD-10-CM

## 2025-07-17 DIAGNOSIS — G47.33 OBSTRUCTIVE SLEEP APNEA SYNDROME: ICD-10-CM

## 2025-07-17 DIAGNOSIS — I49.3 PVC'S (PREMATURE VENTRICULAR CONTRACTIONS): ICD-10-CM

## 2025-07-17 DIAGNOSIS — R06.02 SHORTNESS OF BREATH: ICD-10-CM

## 2025-07-17 DIAGNOSIS — E78.2 MIXED HYPERLIPIDEMIA: ICD-10-CM

## 2025-07-17 PROCEDURE — 1159F MED LIST DOCD IN RCRD: CPT | Performed by: INTERNAL MEDICINE

## 2025-07-17 PROCEDURE — 99214 OFFICE O/P EST MOD 30 MIN: CPT | Performed by: INTERNAL MEDICINE

## 2025-07-17 PROCEDURE — G8399 PT W/DXA RESULTS DOCUMENT: HCPCS | Performed by: INTERNAL MEDICINE

## 2025-07-17 PROCEDURE — 1126F AMNT PAIN NOTED NONE PRSNT: CPT | Performed by: INTERNAL MEDICINE

## 2025-07-17 PROCEDURE — 1036F TOBACCO NON-USER: CPT | Performed by: INTERNAL MEDICINE

## 2025-07-17 PROCEDURE — G8417 CALC BMI ABV UP PARAM F/U: HCPCS | Performed by: INTERNAL MEDICINE

## 2025-07-17 PROCEDURE — 3077F SYST BP >= 140 MM HG: CPT | Performed by: INTERNAL MEDICINE

## 2025-07-17 PROCEDURE — 3080F DIAST BP >= 90 MM HG: CPT | Performed by: INTERNAL MEDICINE

## 2025-07-17 PROCEDURE — 1123F ACP DISCUSS/DSCN MKR DOCD: CPT | Performed by: INTERNAL MEDICINE

## 2025-07-17 PROCEDURE — G8427 DOCREV CUR MEDS BY ELIG CLIN: HCPCS | Performed by: INTERNAL MEDICINE

## 2025-07-17 PROCEDURE — 1090F PRES/ABSN URINE INCON ASSESS: CPT | Performed by: INTERNAL MEDICINE

## 2025-07-17 PROCEDURE — 1160F RVW MEDS BY RX/DR IN RCRD: CPT | Performed by: INTERNAL MEDICINE

## 2025-07-17 PROCEDURE — 3017F COLORECTAL CA SCREEN DOC REV: CPT | Performed by: INTERNAL MEDICINE

## 2025-07-17 RX ORDER — FLECAINIDE ACETATE 50 MG/1
50 TABLET ORAL 2 TIMES DAILY
Qty: 180 TABLET | Refills: 3 | Status: SHIPPED | OUTPATIENT
Start: 2025-07-17

## 2025-07-17 ASSESSMENT — ENCOUNTER SYMPTOMS
SHORTNESS OF BREATH: 1
SNORING: 1
COUGH: 1
WHEEZING: 0
SPUTUM PRODUCTION: 0

## 2025-07-21 ENCOUNTER — TELEPHONE (OUTPATIENT)
Age: 70
End: 2025-07-21

## 2025-07-21 NOTE — TELEPHONE ENCOUNTER
Patient called stating she has the following issues :    Lab orders are in for Brain Natriuretic Peptide  Should this lab be performed before her diagnostics or after?     Please call and advise.

## 2025-07-21 NOTE — TELEPHONE ENCOUNTER
Called and informed patient she could do labs before or after it would not change the EKG. Voiced understanding.

## 2025-07-22 DIAGNOSIS — R06.02 SHORTNESS OF BREATH: ICD-10-CM

## 2025-07-23 LAB — NT PRO BNP: 213 PG/ML (ref 0–125)

## 2025-07-28 ENCOUNTER — HOSPITAL ENCOUNTER (OUTPATIENT)
Dept: MAMMOGRAPHY | Age: 70
Discharge: HOME | End: 2025-07-31
Payer: MEDICARE

## 2025-07-28 VITALS — BODY MASS INDEX: 35.08 KG/M2 | WEIGHT: 198 LBS | HEIGHT: 63 IN

## 2025-07-28 DIAGNOSIS — Z12.31 ENCOUNTER FOR SCREENING MAMMOGRAM FOR MALIGNANT NEOPLASM OF BREAST: ICD-10-CM

## 2025-07-28 DIAGNOSIS — Z85.3 HISTORY OF BREAST CANCER: ICD-10-CM

## 2025-07-28 PROCEDURE — 77063 BREAST TOMOSYNTHESIS BI: CPT

## 2025-07-30 ENCOUNTER — OFFICE VISIT (OUTPATIENT)
Dept: FAMILY MEDICINE CLINIC | Facility: CLINIC | Age: 70
End: 2025-07-30

## 2025-07-30 VITALS
DIASTOLIC BLOOD PRESSURE: 74 MMHG | WEIGHT: 198.6 LBS | SYSTOLIC BLOOD PRESSURE: 140 MMHG | HEIGHT: 63 IN | BODY MASS INDEX: 35.19 KG/M2 | RESPIRATION RATE: 16 BRPM | HEART RATE: 59 BPM | OXYGEN SATURATION: 97 %

## 2025-07-30 DIAGNOSIS — F41.9 ANXIETY: ICD-10-CM

## 2025-07-30 DIAGNOSIS — R20.2 NUMBNESS AND TINGLING OF LEFT UPPER EXTREMITY: ICD-10-CM

## 2025-07-30 DIAGNOSIS — I10 ESSENTIAL HYPERTENSION: Primary | ICD-10-CM

## 2025-07-30 DIAGNOSIS — I47.10 SVT (SUPRAVENTRICULAR TACHYCARDIA): ICD-10-CM

## 2025-07-30 DIAGNOSIS — G47.33 OSA (OBSTRUCTIVE SLEEP APNEA): ICD-10-CM

## 2025-07-30 DIAGNOSIS — I49.3 FREQUENT PVCS: ICD-10-CM

## 2025-07-30 DIAGNOSIS — Z12.11 COLON CANCER SCREENING: ICD-10-CM

## 2025-07-30 DIAGNOSIS — R20.0 NUMBNESS AND TINGLING OF LEFT UPPER EXTREMITY: ICD-10-CM

## 2025-07-30 RX ORDER — ESCITALOPRAM OXALATE 20 MG/1
20 TABLET ORAL DAILY
Qty: 90 TABLET | Refills: 0 | Status: SHIPPED | OUTPATIENT
Start: 2025-07-30

## 2025-07-30 RX ORDER — HYDROCHLOROTHIAZIDE 12.5 MG/1
12.5 CAPSULE ORAL EVERY MORNING
Qty: 90 CAPSULE | Refills: 0 | Status: SHIPPED | OUTPATIENT
Start: 2025-07-30

## 2025-07-30 ASSESSMENT — ENCOUNTER SYMPTOMS
SHORTNESS OF BREATH: 0
ABDOMINAL PAIN: 0
COUGH: 0
VOMITING: 0
NAUSEA: 0
BLOOD IN STOOL: 0

## 2025-07-30 ASSESSMENT — PATIENT HEALTH QUESTIONNAIRE - PHQ9
SUM OF ALL RESPONSES TO PHQ QUESTIONS 1-9: 0
1. LITTLE INTEREST OR PLEASURE IN DOING THINGS: NOT AT ALL
SUM OF ALL RESPONSES TO PHQ QUESTIONS 1-9: 0
2. FEELING DOWN, DEPRESSED OR HOPELESS: NOT AT ALL

## 2025-07-30 NOTE — PROGRESS NOTES
mo+) and AFLURIA, (age 3 y+), Quadv PF, 0.5mL 11/15/2016, 10/01/2018, 10/03/2019    Pneumococcal, PCV20, PREVNAR 20, (age 6w+), IM, 0.5mL 05/22/2023    Pneumococcal, PPSV23, PNEUMOVAX 23, (age 2y+), SC/IM, 0.5mL 10/03/2016, 05/19/2022    TDaP, ADACEL (age 10y-64y), BOOSTRIX (age 10y+), IM, 0.5mL 07/19/2017    Zoster Recombinant (Shingrix) 10/01/2018       PHQ-9: Over the past 2 weeks, how often have you been bothered by any of the following problems?  Little interest or pleasure in doing things: Not at all  Feeling down, depressed, or hopeless: Not at all  PHQ-9 Total Score: 0     Vitals:    Vitals:    07/30/25 1203   BP: (!) 140/74   BP Site: Left Upper Arm   Patient Position: Sitting   Pulse: 59   Resp: 16   SpO2: 97%   Weight: 90.1 kg (198 lb 9.6 oz)   Height: 1.6 m (5' 3\")          Physical Exam:    Physical Exam  Vitals reviewed.   HENT:      Head: Normocephalic and atraumatic.   Cardiovascular:      Rate and Rhythm: Normal rate and regular rhythm.      Heart sounds: No murmur heard.  Pulmonary:      Effort: Pulmonary effort is normal. No respiratory distress.      Breath sounds: Normal breath sounds. No wheezing or rhonchi.   Abdominal:      General: There is no distension.      Palpations: There is no mass.      Tenderness: There is no abdominal tenderness. There is no guarding.      Hernia: No hernia is present.   Musculoskeletal:      Cervical back: Neck supple.      Right lower leg: Edema present.      Left lower leg: Edema present.      Comments: Trace B/L   Lymphadenopathy:      Cervical: No cervical adenopathy.   Skin:     General: Skin is warm and dry.   Neurological:      Mental Status: She is alert.   Psychiatric:         Mood and Affect: Mood normal.         Behavior: Behavior normal.               Emily Romero,     The patient (or guardian, if applicable) and other individuals in attendance with the patient were advised that Artificial Intelligence will be utilized during this visit to

## 2025-07-31 ENCOUNTER — CLINICAL SUPPORT (OUTPATIENT)
Age: 70
End: 2025-07-31
Payer: MEDICARE

## 2025-07-31 DIAGNOSIS — I49.3 PVC'S (PREMATURE VENTRICULAR CONTRACTIONS): Primary | ICD-10-CM

## 2025-07-31 PROCEDURE — 93000 ELECTROCARDIOGRAM COMPLETE: CPT | Performed by: INTERNAL MEDICINE

## 2025-07-31 NOTE — PROGRESS NOTES
Pt came in for an EKG per Dr. Thakur, Dr. Thakur reviewed states looks good. Advised pt was given a verbal understanding.

## 2025-08-01 ENCOUNTER — OFFICE VISIT (OUTPATIENT)
Dept: ONCOLOGY | Age: 70
End: 2025-08-01

## 2025-08-01 VITALS
RESPIRATION RATE: 20 BRPM | TEMPERATURE: 97.9 F | BODY MASS INDEX: 35.61 KG/M2 | OXYGEN SATURATION: 99 % | WEIGHT: 201 LBS | HEART RATE: 57 BPM | DIASTOLIC BLOOD PRESSURE: 85 MMHG | SYSTOLIC BLOOD PRESSURE: 161 MMHG | HEIGHT: 63 IN

## 2025-08-01 DIAGNOSIS — Z85.3 HISTORY OF BREAST CANCER: ICD-10-CM

## 2025-08-01 DIAGNOSIS — Z12.31 ENCOUNTER FOR SCREENING MAMMOGRAM FOR MALIGNANT NEOPLASM OF BREAST: ICD-10-CM

## 2025-08-01 ASSESSMENT — PATIENT HEALTH QUESTIONNAIRE - PHQ9
SUM OF ALL RESPONSES TO PHQ QUESTIONS 1-9: 0
SUM OF ALL RESPONSES TO PHQ QUESTIONS 1-9: 0
1. LITTLE INTEREST OR PLEASURE IN DOING THINGS: NOT AT ALL
2. FEELING DOWN, DEPRESSED OR HOPELESS: NOT AT ALL
SUM OF ALL RESPONSES TO PHQ QUESTIONS 1-9: 0
SUM OF ALL RESPONSES TO PHQ QUESTIONS 1-9: 0

## 2025-08-01 NOTE — PROGRESS NOTES
[facility]: Office Visit Established Patient    Chief Complaint:    Chief Complaint   Patient presents with    Follow-up         History of Present Illness:  Ms. Chen is a 69 y.o. female who returns today for management of breast cancer.  She had a screening  mammogram in June 2016 that showed a developing group of calcifications in the upper outer right breast.  These were confirmed on additional views and she went for biopsy which showed DCIS.  She was referred for surgical management and underwent lumpectomy  on 8/26/16 which surprisingly showed two foci of invasive ductal carcinoma in a background of DCIS.  The larger of the two foci was 0.7 cm, and receptors showed 92% ER and 7% DE, but also showed HER2 3+ positive.  She went back for sentinel node biopsy and reexcision, both of which were negative for residual carcinoma.  She is referred to medical oncology for adjuvant recommendations. Her tumor was small, but HER2 positive, which suggests an increased  risk of recurrence, unless she receives adjuvant chemoimmunotherapy.  Given the T1N0 disease, she would be an ideal candidate for the APT regimen with weekly paclitaxel + trastuzumab for 12 weeks, followed by Herceptin monotherapy to complete a year.   She will complete radiation after chemotherapy and then start endocrine therapy with AI.  She did not tolerate either Arimidex or Femara due to arthralgias, so she was changed to tamoxifen, which was  better tolerated, but she developed endometrial thickening requiring D&C.  Ultimately, she decided to retry Arimidex once again.  We stopped therapy in spring 2022 after completing (close to) 5 years of therapy.       History of Present Illness  The patient, a 69-year-old female, presents for follow-up regarding her history of breast carcinoma. She is currently undergoing annual surveillance following the completion of a 5-year course of endocrine therapy in 2022.    The patient reports

## 2025-08-01 NOTE — PATIENT INSTRUCTIONS
Patient Information from Today's Visit    The members of your [facility] are listed below:    Physician Provider: Dr. Abdi Montes  Advanced Practice Clinician: Racheal Kamara  Registered Nurse: Yazan AVELAR   Nurse Navigator: Ying DALEY RN and Sue BATRES RN  Medical Assistant: Ying \"My\" SHARI   : Antionette \"Bess\" C.   Supportive Care Services: DAREN Martin    Diagnosis (Information Sheet Provided on Day of Diagnosis): Breast Cancer    Follow Up Instructions:   1 year    Has Treatment Plan Been Finalized? N/A     Current Labs: No labs drawn today.        Please refer to After Visit Summary or MyChart for upcoming appointment information. Please call our office for rescheduling needs at least 24 hours before your scheduled appointment time.If you have any questions regarding your upcoming schedule please reach out to your care team through [facility] or call [phone].     Please notify your assigned Nurse Navigator of any unplanned hospital admissions or Emergency Room visits within 24 hours of discharge.    -------------------------------------------------------------------------------------------------------------------  Please call our office at [phone] if you have any  of the following symptoms:   Fever of 100.5 or greater  Chills  Shortness of breath  Swelling or pain in one leg    After office hours an answering service is available and will contact a provider for emergencies or if you are experiencing any of the above symptoms.        YAZAN BAIRD RN

## 2025-08-18 ENCOUNTER — OFFICE VISIT (OUTPATIENT)
Age: 70
End: 2025-08-18
Payer: MEDICARE

## 2025-08-18 VITALS
DIASTOLIC BLOOD PRESSURE: 90 MMHG | BODY MASS INDEX: 35.26 KG/M2 | SYSTOLIC BLOOD PRESSURE: 168 MMHG | WEIGHT: 199 LBS | HEART RATE: 60 BPM | HEIGHT: 63 IN

## 2025-08-18 DIAGNOSIS — I10 ESSENTIAL HYPERTENSION: ICD-10-CM

## 2025-08-18 DIAGNOSIS — I49.3 PVC (PREMATURE VENTRICULAR CONTRACTION): ICD-10-CM

## 2025-08-18 DIAGNOSIS — I20.0 ACCELERATING ANGINA (HCC): Primary | ICD-10-CM

## 2025-08-18 PROCEDURE — 1090F PRES/ABSN URINE INCON ASSESS: CPT | Performed by: INTERNAL MEDICINE

## 2025-08-18 PROCEDURE — 1159F MED LIST DOCD IN RCRD: CPT | Performed by: INTERNAL MEDICINE

## 2025-08-18 PROCEDURE — 1123F ACP DISCUSS/DSCN MKR DOCD: CPT | Performed by: INTERNAL MEDICINE

## 2025-08-18 PROCEDURE — G8399 PT W/DXA RESULTS DOCUMENT: HCPCS | Performed by: INTERNAL MEDICINE

## 2025-08-18 PROCEDURE — 3077F SYST BP >= 140 MM HG: CPT | Performed by: INTERNAL MEDICINE

## 2025-08-18 PROCEDURE — 99214 OFFICE O/P EST MOD 30 MIN: CPT | Performed by: INTERNAL MEDICINE

## 2025-08-18 PROCEDURE — 1126F AMNT PAIN NOTED NONE PRSNT: CPT | Performed by: INTERNAL MEDICINE

## 2025-08-18 PROCEDURE — 1160F RVW MEDS BY RX/DR IN RCRD: CPT | Performed by: INTERNAL MEDICINE

## 2025-08-18 PROCEDURE — 3080F DIAST BP >= 90 MM HG: CPT | Performed by: INTERNAL MEDICINE

## 2025-08-18 PROCEDURE — 1036F TOBACCO NON-USER: CPT | Performed by: INTERNAL MEDICINE

## 2025-08-18 PROCEDURE — G8427 DOCREV CUR MEDS BY ELIG CLIN: HCPCS | Performed by: INTERNAL MEDICINE

## 2025-08-18 PROCEDURE — 3017F COLORECTAL CA SCREEN DOC REV: CPT | Performed by: INTERNAL MEDICINE

## 2025-08-18 PROCEDURE — G8417 CALC BMI ABV UP PARAM F/U: HCPCS | Performed by: INTERNAL MEDICINE

## 2025-08-18 RX ORDER — ONDANSETRON 2 MG/ML
4 INJECTION INTRAMUSCULAR; INTRAVENOUS EVERY 6 HOURS PRN
OUTPATIENT
Start: 2025-08-18

## 2025-08-18 RX ORDER — SODIUM CHLORIDE 0.9 % (FLUSH) 0.9 %
5-40 SYRINGE (ML) INJECTION PRN
OUTPATIENT
Start: 2025-08-18

## 2025-08-18 RX ORDER — NITROGLYCERIN 0.4 MG/1
0.4 TABLET SUBLINGUAL EVERY 5 MIN PRN
OUTPATIENT
Start: 2025-08-18

## 2025-08-18 RX ORDER — LORAZEPAM 0.5 MG/1
0.5 TABLET ORAL
OUTPATIENT
Start: 2025-08-18

## 2025-08-18 RX ORDER — SODIUM CHLORIDE 9 MG/ML
INJECTION, SOLUTION INTRAVENOUS PRN
OUTPATIENT
Start: 2025-08-18

## 2025-08-18 RX ORDER — DIPHENHYDRAMINE HYDROCHLORIDE 50 MG/ML
50 INJECTION, SOLUTION INTRAMUSCULAR; INTRAVENOUS ONCE
OUTPATIENT
Start: 2025-08-18 | End: 2025-08-18

## 2025-08-18 RX ORDER — HYDROCORTISONE SODIUM SUCCINATE 100 MG/2ML
200 INJECTION INTRAMUSCULAR; INTRAVENOUS ONCE
OUTPATIENT
Start: 2025-08-18 | End: 2025-08-18

## 2025-08-18 RX ORDER — SODIUM CHLORIDE 0.9 % (FLUSH) 0.9 %
5-40 SYRINGE (ML) INJECTION EVERY 12 HOURS SCHEDULED
OUTPATIENT
Start: 2025-08-18

## 2025-08-18 RX ORDER — AMLODIPINE BESYLATE 2.5 MG/1
2.5 TABLET ORAL DAILY
Qty: 90 TABLET | Refills: 3 | Status: SHIPPED | OUTPATIENT
Start: 2025-08-18

## 2025-08-18 ASSESSMENT — ENCOUNTER SYMPTOMS: SHORTNESS OF BREATH: 1

## 2025-08-19 PROBLEM — I20.0 ACCELERATING ANGINA (HCC): Status: ACTIVE | Noted: 2025-08-18

## 2025-08-25 ENCOUNTER — HOSPITAL ENCOUNTER (OUTPATIENT)
Age: 70
Setting detail: OUTPATIENT SURGERY
Discharge: HOME | End: 2025-08-25
Attending: INTERNAL MEDICINE | Admitting: INTERNAL MEDICINE
Payer: MEDICARE

## 2025-08-25 VITALS
DIASTOLIC BLOOD PRESSURE: 63 MMHG | BODY MASS INDEX: 35.26 KG/M2 | RESPIRATION RATE: 17 BRPM | WEIGHT: 199 LBS | OXYGEN SATURATION: 100 % | SYSTOLIC BLOOD PRESSURE: 113 MMHG | HEIGHT: 63 IN | TEMPERATURE: 98.1 F | HEART RATE: 49 BPM

## 2025-08-25 DIAGNOSIS — I20.0 ACCELERATING ANGINA (HCC): ICD-10-CM

## 2025-08-25 LAB
ANION GAP SERPL CALC-SCNC: 10 MMOL/L (ref 7–16)
BASOPHILS # BLD: 0.02 K/UL (ref 0–0.2)
BASOPHILS NFR BLD: 0.4 % (ref 0–2)
BUN SERPL-MCNC: 13 MG/DL (ref 8–23)
CALCIUM SERPL-MCNC: 9.4 MG/DL (ref 8.8–10.2)
CHLORIDE SERPL-SCNC: 107 MMOL/L (ref 98–107)
CO2 SERPL-SCNC: 25 MMOL/L (ref 20–29)
CREAT SERPL-MCNC: 0.94 MG/DL (ref 0.6–1.1)
DIFFERENTIAL METHOD BLD: NORMAL
ECHO BSA: 2 M2
EKG ATRIAL RATE: 68 BPM
EKG DIAGNOSIS: NORMAL
EKG P AXIS: 51 DEGREES
EKG P-R INTERVAL: 164 MS
EKG Q-T INTERVAL: 438 MS
EKG QRS DURATION: 92 MS
EKG QTC CALCULATION (BAZETT): 465 MS
EKG R AXIS: 18 DEGREES
EKG T AXIS: 61 DEGREES
EKG VENTRICULAR RATE: 68 BPM
EOSINOPHIL # BLD: 0.11 K/UL (ref 0–0.8)
EOSINOPHIL NFR BLD: 2.2 % (ref 0.5–7.8)
ERYTHROCYTE [DISTWIDTH] IN BLOOD BY AUTOMATED COUNT: 13.8 % (ref 11.9–14.6)
GLUCOSE SERPL-MCNC: 114 MG/DL (ref 70–99)
HCT VFR BLD AUTO: 37.5 % (ref 35.8–46.3)
HGB BLD-MCNC: 12.3 G/DL (ref 11.7–15.4)
IMM GRANULOCYTES # BLD AUTO: 0.01 K/UL (ref 0–0.5)
IMM GRANULOCYTES NFR BLD AUTO: 0.2 % (ref 0–5)
LYMPHOCYTES # BLD: 1.92 K/UL (ref 0.5–4.6)
LYMPHOCYTES NFR BLD: 38.3 % (ref 13–44)
MAGNESIUM SERPL-MCNC: 2 MG/DL (ref 1.8–2.4)
MCH RBC QN AUTO: 30.1 PG (ref 26.1–32.9)
MCHC RBC AUTO-ENTMCNC: 32.8 G/DL (ref 31.4–35)
MCV RBC AUTO: 91.9 FL (ref 82–102)
MONOCYTES # BLD: 0.52 K/UL (ref 0.1–1.3)
MONOCYTES NFR BLD: 10.4 % (ref 4–12)
NEUTS SEG # BLD: 2.43 K/UL (ref 1.7–8.2)
NEUTS SEG NFR BLD: 48.5 % (ref 43–78)
NRBC # BLD: 0 K/UL (ref 0–0.2)
PLATELET # BLD AUTO: 226 K/UL (ref 150–450)
PMV BLD AUTO: 9.5 FL (ref 9.4–12.3)
POTASSIUM SERPL-SCNC: 3.8 MMOL/L (ref 3.5–5.1)
RBC # BLD AUTO: 4.08 M/UL (ref 4.05–5.2)
SODIUM SERPL-SCNC: 142 MMOL/L (ref 136–145)
WBC # BLD AUTO: 5 K/UL (ref 4.3–11.1)

## 2025-08-25 PROCEDURE — 99152 MOD SED SAME PHYS/QHP 5/>YRS: CPT | Performed by: INTERNAL MEDICINE

## 2025-08-25 PROCEDURE — C1769 GUIDE WIRE: HCPCS | Performed by: INTERNAL MEDICINE

## 2025-08-25 PROCEDURE — 6360000004 HC RX CONTRAST MEDICATION: Performed by: INTERNAL MEDICINE

## 2025-08-25 PROCEDURE — 93458 L HRT ARTERY/VENTRICLE ANGIO: CPT | Performed by: INTERNAL MEDICINE

## 2025-08-25 PROCEDURE — 85025 COMPLETE CBC W/AUTO DIFF WBC: CPT

## 2025-08-25 PROCEDURE — 75710 ARTERY X-RAYS ARM/LEG: CPT | Performed by: INTERNAL MEDICINE

## 2025-08-25 PROCEDURE — 6360000002 HC RX W HCPCS: Performed by: INTERNAL MEDICINE

## 2025-08-25 PROCEDURE — 93005 ELECTROCARDIOGRAM TRACING: CPT | Performed by: INTERNAL MEDICINE

## 2025-08-25 PROCEDURE — 2500000003 HC RX 250 WO HCPCS: Performed by: INTERNAL MEDICINE

## 2025-08-25 PROCEDURE — 2580000003 HC RX 258: Performed by: INTERNAL MEDICINE

## 2025-08-25 PROCEDURE — 93010 ELECTROCARDIOGRAM REPORT: CPT | Performed by: INTERNAL MEDICINE

## 2025-08-25 PROCEDURE — C1894 INTRO/SHEATH, NON-LASER: HCPCS | Performed by: INTERNAL MEDICINE

## 2025-08-25 PROCEDURE — 80048 BASIC METABOLIC PNL TOTAL CA: CPT

## 2025-08-25 PROCEDURE — 2709999900 HC NON-CHARGEABLE SUPPLY: Performed by: INTERNAL MEDICINE

## 2025-08-25 PROCEDURE — 83735 ASSAY OF MAGNESIUM: CPT

## 2025-08-25 RX ORDER — ACETAMINOPHEN 325 MG/1
650 TABLET ORAL EVERY 4 HOURS PRN
Status: DISCONTINUED | OUTPATIENT
Start: 2025-08-25 | End: 2025-08-25 | Stop reason: HOSPADM

## 2025-08-25 RX ORDER — MIDAZOLAM HYDROCHLORIDE 1 MG/ML
INJECTION, SOLUTION INTRAMUSCULAR; INTRAVENOUS PRN
Status: DISCONTINUED | OUTPATIENT
Start: 2025-08-25 | End: 2025-08-25 | Stop reason: HOSPADM

## 2025-08-25 RX ORDER — HEPARIN SODIUM 200 [USP'U]/100ML
INJECTION, SOLUTION INTRAVENOUS CONTINUOUS PRN
Status: DISCONTINUED | OUTPATIENT
Start: 2025-08-25 | End: 2025-08-25 | Stop reason: HOSPADM

## 2025-08-25 RX ORDER — LIDOCAINE HYDROCHLORIDE 10 MG/ML
INJECTION, SOLUTION INFILTRATION; PERINEURAL PRN
Status: DISCONTINUED | OUTPATIENT
Start: 2025-08-25 | End: 2025-08-25 | Stop reason: HOSPADM

## 2025-08-25 RX ORDER — SODIUM CHLORIDE 9 MG/ML
INJECTION, SOLUTION INTRAVENOUS CONTINUOUS
Status: DISCONTINUED | OUTPATIENT
Start: 2025-08-25 | End: 2025-08-25 | Stop reason: HOSPADM

## 2025-08-25 RX ORDER — ASPIRIN 81 MG/1
324 TABLET, CHEWABLE ORAL DAILY
Status: DISCONTINUED | OUTPATIENT
Start: 2025-08-25 | End: 2025-08-25 | Stop reason: HOSPADM

## 2025-08-25 RX ORDER — SODIUM CHLORIDE 0.9 % (FLUSH) 0.9 %
5-40 SYRINGE (ML) INJECTION EVERY 12 HOURS SCHEDULED
Status: DISCONTINUED | OUTPATIENT
Start: 2025-08-25 | End: 2025-08-25 | Stop reason: HOSPADM

## 2025-08-25 RX ORDER — SODIUM CHLORIDE 9 MG/ML
INJECTION, SOLUTION INTRAVENOUS CONTINUOUS
Status: DISCONTINUED | OUTPATIENT
Start: 2025-08-25 | End: 2025-08-25 | Stop reason: SDUPTHER

## 2025-08-25 RX ORDER — SODIUM CHLORIDE 0.9 % (FLUSH) 0.9 %
5-40 SYRINGE (ML) INJECTION PRN
Status: DISCONTINUED | OUTPATIENT
Start: 2025-08-25 | End: 2025-08-25 | Stop reason: HOSPADM

## 2025-08-25 RX ORDER — IOPAMIDOL 755 MG/ML
INJECTION, SOLUTION INTRAVASCULAR PRN
Status: DISCONTINUED | OUTPATIENT
Start: 2025-08-25 | End: 2025-08-25 | Stop reason: HOSPADM

## 2025-08-25 RX ADMIN — SODIUM CHLORIDE: 9 INJECTION, SOLUTION INTRAVENOUS at 09:30

## 2025-08-25 ASSESSMENT — PAIN SCALES - GENERAL
PAINLEVEL_OUTOF10: 0

## (undated) DEVICE — SOLUTION IRRIG 3000ML 0.9% SOD CHL FLX CONT 0797208] ICU MEDICAL INC]

## (undated) DEVICE — BAND COMPR L24CM REG CLR PLAS HEMSTAT EXT HK AND LOOP RETEN

## (undated) DEVICE — DRAPE TWL SURG 16X26IN BLU ORB04] ALLCARE INC]

## (undated) DEVICE — CARDINAL HEALTH FLEXIBLE LIGHT HANDLE COVER: Brand: CARDINAL HEALTH

## (undated) DEVICE — CONTAINER SPEC FRMLN 120ML --

## (undated) DEVICE — CYSTO: Brand: MEDLINE INDUSTRIES, INC.

## (undated) DEVICE — PVC URETHRAL CATHETER: Brand: DOVER

## (undated) DEVICE — CATHETER ANGIO 5FR L110CM COR NYL POLYUR S STL RAD TIGER 4

## (undated) DEVICE — GOWN,REINF,POLY,ECL,PP SLV,XL: Brand: MEDLINE

## (undated) DEVICE — CATHETER COR DIAG PIGTAILS PIG 145 CRV 5FR 110CM 6 SIDE H

## (undated) DEVICE — TRAY PREP DRY W/ PREM GLV 2 APPL 6 SPNG 2 UNDPD 1 OVERWRAP

## (undated) DEVICE — PERI-PAD,MODERATE: Brand: CURITY

## (undated) DEVICE — DRAPE,UNDERBUTTOCKS,PCH,STERILE: Brand: MEDLINE

## (undated) DEVICE — GUIDEWIRE VASC L145CM MOD J GUID WIRE SYS HI TORQ VERSACORE

## (undated) DEVICE — AMD ANTIMICROBIAL NON-ADHERENT PAD,0.2% POLYHEXAMETHYLENE BIGUANIDE HCI (PHMB): Brand: TELFA

## (undated) DEVICE — KIT INTRO 6FR L10CM MINI WIRE L45CM DIA0.021IN NDL 21GA ANT

## (undated) DEVICE — GUIDEWIRE 035IN 210CM PTFE COAT FIX COR J TIP 15MM FIRM BODY

## (undated) DEVICE — KENDALL SCD EXPRESS SLEEVES, KNEE LENGTH, MEDIUM: Brand: KENDALL SCD